# Patient Record
Sex: FEMALE | Race: WHITE | Employment: OTHER | URBAN - METROPOLITAN AREA
[De-identification: names, ages, dates, MRNs, and addresses within clinical notes are randomized per-mention and may not be internally consistent; named-entity substitution may affect disease eponyms.]

---

## 2017-01-05 ENCOUNTER — ANTICOAGULATION THERAPY VISIT (OUTPATIENT)
Dept: NURSING | Facility: CLINIC | Age: 82
End: 2017-01-05
Payer: COMMERCIAL

## 2017-01-05 DIAGNOSIS — Z79.01 LONG-TERM (CURRENT) USE OF ANTICOAGULANTS: Primary | ICD-10-CM

## 2017-01-05 DIAGNOSIS — I48.0 PAROXYSMAL ATRIAL FIBRILLATION (H): ICD-10-CM

## 2017-01-05 LAB — INR POINT OF CARE: 2.8 (ref 0.86–1.14)

## 2017-01-05 PROCEDURE — 85610 PROTHROMBIN TIME: CPT | Mod: QW

## 2017-01-05 PROCEDURE — 36416 COLLJ CAPILLARY BLOOD SPEC: CPT

## 2017-01-05 PROCEDURE — 99207 ZZC NO CHARGE NURSE ONLY: CPT

## 2017-01-05 NOTE — MR AVS SNAPSHOT
Shannan TERAN Rasta   1/5/2017 9:00 AM   Anticoagulation Therapy Visit    Description:  85 year old female   Provider:  DARA ANTI COAG   Department:  [facility]           INR as of 1/5/2017     Selected INR 2.8 (1/5/2017)      Anticoagulation Summary as of 1/5/2017     INR goal 2.0-3.0   Selected INR 2.8 (1/5/2017)   Full instructions 7.5 mg on Wed; 5 mg all other days   Next INR check 2/16/2017    Indications   Paroxysmal atrial fibrillation (H) [I48.0]  Long-term (current) use of anticoagulants [Z79.01] [Z79.01]         Your next Anticoagulation Clinic appointment(s)     Jan 05, 2017  9:00 AM   Anticoagulation Visit with DARA ANTI COAG   [facility] ([place] [id number] [facility] [id number] [phone]            Feb 16, 2017 10:00 AM   Anticoagulation Visit with DARA ANTI COAG   [facility] ([address] [id number] [phone]              Contact Numbers     [facility]  Please call [phone] to cancel and/or reschedule your appointment   Please call [phone] with any problems or questions regarding your therapy.        January 2017 Details    Sun Mon Tue Wed Thu Fri Sat     1               2               3               4               5      5 mg   See details      6      5 mg         7      5 mg           8      5 mg         9      5 mg         10      5 mg         11      7.5 mg         12      5 mg         13      5 mg         14      5 mg           15      5 mg         16      5 mg         17      5 mg         18      7.5 mg         19      5 mg         20      5 mg         21      5 mg           22      5 mg         23      5 mg         24      5 mg         25      7.5 mg         26      5 mg         27      5 mg         28      5 mg           29      5 mg         30      5 mg         31      5 mg              Date Details   01/05 This INR check               How to take your warfarin  dose     To take:  5 mg Take 1 of the 5 mg tablets.    To take:  7.5 mg Take 1.5 of the 5 mg tablets.           February 2017 Details    Sun Mon Tue Wed Thu Fri Sat        1      7.5 mg         2      5 mg         3      5 mg         4      5 mg           5      5 mg         6      5 mg         7      5 mg         8      7.5 mg         9      5 mg         10      5 mg         11      5 mg           12      5 mg         13      5 mg         14      5 mg         15      7.5 mg         16            17               18                 19               20               21               22               23               24               25                 26               27               28                    Date Details   No additional details    Date of next INR:  2/16/2017         How to take your warfarin dose     To take:  5 mg Take 1 of the 5 mg tablets.    To take:  7.5 mg Take 1.5 of the 5 mg tablets.

## 2017-01-05 NOTE — PROGRESS NOTES
ANTICOAGULATION FOLLOW-UP CLINIC VISIT    Patient Name:  Shannan Magdaleno  Date:  1/5/2017  Contact Type:  Face to Face    SUBJECTIVE:     Patient Findings     Positives No Problem Findings           OBJECTIVE    INR PROTIME   Date Value Ref Range Status   01/05/2017 2.8* 0.86 - 1.14 Final       ASSESSMENT / PLAN  INR assessment THER    Recheck INR In: 6 WEEKS    INR Location Clinic      Anticoagulation Summary as of 1/5/2017     INR goal 2.0-3.0   Selected INR 2.8 (1/5/2017)   Maintenance plan 7.5 mg (5 mg x 1.5) on Wed; 5 mg (5 mg x 1) all other days   Full instructions 7.5 mg on Wed; 5 mg all other days   Weekly total 37.5 mg   No change documented Robin Stratton RN   Plan last modified Alessandra Garcia RN (3/16/2016)   Next INR check 2/16/2017   Priority INR   Target end date     Indications   Paroxysmal atrial fibrillation (H) [I48.0]  Long-term (current) use of anticoagulants [Z79.01] [Z79.01]         Anticoagulation Episode Summary     INR check location     Preferred lab     Send INR reminders to [facility] CLINIC    Comments       Anticoagulation Care Providers     Provider Role Specialty Phone number    Adela Morgan MD Responsible Internal Medicine [phone]            See the Encounter Report to view Anticoagulation Flowsheet and Dosing Calendar (Go to Encounters tab in chart review, and find the Anticoagulation Therapy Visit)    RN reviewed patient's weekly warfarin dose.  Pt INR remains within therapeutic range.  Pt will continue with current dosing and monitoring as planned, based on physician directed care plan.    [facility] ANTI-COAG [facility]     Robin Stratton RN

## 2017-01-06 RX ORDER — WARFARIN SODIUM 5 MG/1
TABLET ORAL
Qty: 90 TABLET | Refills: 0 | Status: SHIPPED | OUTPATIENT
Start: 2017-01-06 | End: 2017-01-25

## 2017-01-06 NOTE — TELEPHONE ENCOUNTER
Medication refilled per [facility] RN protocol.   Katherin Ford, RN   [facility]- Float Nurse

## 2017-01-06 NOTE — TELEPHONE ENCOUNTER
Warfarin     Last Written Prescription Date: 03/08/2016  Last Fill Qty: 90, # refills: 1  Last Office Visit with G, P or [facility] prescribing provider: 12/30/2016       Date and Result of Last PT/INR:   INR      2.8   1/5/2017  INR      2.7   11/23/2016  INR      3.1   11/28/2015  INR     1.70   7/7/2015  INR      2.0   8/21/2014

## 2017-01-18 DIAGNOSIS — F41.9 ANXIETY: Primary | ICD-10-CM

## 2017-01-19 NOTE — TELEPHONE ENCOUNTER
Controlled Substance Refill Request for diazepam (VALIUM) 2 MG tablet  Problem List Complete:  No     PROVIDER TO CONSIDER COMPLETION OF PROBLEM LIST AND OVERVIEW/CONTROLLED SUBSTANCE AGREEMENT    Last Written Prescription Date:  5/27/2016  Last Fill Quantity: 60,   # refills: 0    Last Office Visit with [facility] primary care provider: 12/30/2016    Future Office visit:     Controlled substance agreement on file: No.     Processing:  unknown   checked in past 6 months?  No, route to RN

## 2017-01-23 RX ORDER — DIAZEPAM 2 MG
TABLET ORAL
Qty: 60 TABLET | Refills: 0 | Status: SHIPPED | OUTPATIENT
Start: 2017-01-23 | End: 2017-03-29

## 2017-01-23 NOTE — TELEPHONE ENCOUNTER
Routing refill request to provider for review/approval because:  Drug not on the [facility] refill protocol   Abigail Perez RN

## 2017-01-23 NOTE — TELEPHONE ENCOUNTER
Valium prescription faxed to [facility] pharmacy at [phone].  Marifer Nieves,

## 2017-01-25 ENCOUNTER — OFFICE VISIT (OUTPATIENT)
Dept: FAMILY MEDICINE | Facility: CLINIC | Age: 82
End: 2017-01-25
Payer: COMMERCIAL

## 2017-01-25 VITALS
BODY MASS INDEX: 27.05 KG/M2 | WEIGHT: 147 LBS | OXYGEN SATURATION: 96 % | HEART RATE: 70 BPM | SYSTOLIC BLOOD PRESSURE: 136 MMHG | TEMPERATURE: 98.1 F | DIASTOLIC BLOOD PRESSURE: 73 MMHG | HEIGHT: 62 IN

## 2017-01-25 DIAGNOSIS — F41.9 ANXIETY: ICD-10-CM

## 2017-01-25 DIAGNOSIS — J06.9 UPPER RESPIRATORY TRACT INFECTION, UNSPECIFIED TYPE: Primary | ICD-10-CM

## 2017-01-25 DIAGNOSIS — C83.00 LYMPHOMA, SMALL LYMPHOCYTIC (H): ICD-10-CM

## 2017-01-25 DIAGNOSIS — Z71.89 ADVANCED DIRECTIVES, COUNSELING/DISCUSSION: ICD-10-CM

## 2017-01-25 DIAGNOSIS — I48.0 PAROXYSMAL ATRIAL FIBRILLATION (H): ICD-10-CM

## 2017-01-25 PROCEDURE — 99213 OFFICE O/P EST LOW 20 MIN: CPT | Performed by: FAMILY MEDICINE

## 2017-01-25 RX ORDER — ESCITALOPRAM OXALATE 10 MG/1
15 TABLET ORAL DAILY
Qty: 135 TABLET | Refills: 3 | Status: SHIPPED | OUTPATIENT
Start: 2017-01-25 | End: 2017-05-08

## 2017-01-25 RX ORDER — WARFARIN SODIUM 5 MG/1
TABLET ORAL
Qty: 90 TABLET | Refills: 1 | Status: SHIPPED | OUTPATIENT
Start: 2017-01-25 | End: 2017-09-22

## 2017-01-25 ASSESSMENT — ANXIETY QUESTIONNAIRES
6. BECOMING EASILY ANNOYED OR IRRITABLE: SEVERAL DAYS
GAD7 TOTAL SCORE: 3
1. FEELING NERVOUS, ANXIOUS, OR ON EDGE: SEVERAL DAYS
2. NOT BEING ABLE TO STOP OR CONTROL WORRYING: NOT AT ALL
7. FEELING AFRAID AS IF SOMETHING AWFUL MIGHT HAPPEN: NOT AT ALL
IF YOU CHECKED OFF ANY PROBLEMS ON THIS QUESTIONNAIRE, HOW DIFFICULT HAVE THESE PROBLEMS MADE IT FOR YOU TO DO YOUR WORK, TAKE CARE OF THINGS AT HOME, OR GET ALONG WITH OTHER PEOPLE: SOMEWHAT DIFFICULT
5. BEING SO RESTLESS THAT IT IS HARD TO SIT STILL: NOT AT ALL
3. WORRYING TOO MUCH ABOUT DIFFERENT THINGS: SEVERAL DAYS

## 2017-01-25 ASSESSMENT — PATIENT HEALTH QUESTIONNAIRE - PHQ9: 5. POOR APPETITE OR OVEREATING: NOT AT ALL

## 2017-01-25 NOTE — PROGRESS NOTES
"  SUBJECTIVE:                                                    Shannan Magdaleno is a 85 year old female who presents to clinic today for the following health issues:    RESPIRATORY SYMPTOMS      Duration: x 5-6 days     Description  cough    Severity: moderate    Accompanying signs and symptoms: cough is usually worse at night     History (predisposing factors):  none    Precipitating or alleviating factors: None    Therapies tried and outcome:  rest and fluids oral decongestant     Exposed to  with similar symptoms.     I have reviewed the patient's medical history in detail and updated the computerized patient record.     ROS:  C: NEGATIVE for fever, chills, change in weight  I: NEGATIVE for worrisome rashes, moles or lesions  E: NEGATIVE for vision changes or irritation  ENT/MOUTH: see HPI   RESP:as above  CV: NEGATIVE for chest pain, palpitations or peripheral edema  HEME/ALLERGY/IMMUNE: lymphoma followed by oncology   PSYCHIATRIC: history of anxiety     OBJECTIVE:                                                    /73 mmHg  Pulse 70  Temp(Src) 98.1  F (36.7  C) (Oral)  Ht 5' 2\" (1.575 m)  Wt 147 lb (66.679 kg)  BMI 26.88 kg/m2  SpO2 96%  Breastfeeding? No  Body mass index is 26.88 kg/(m^2).  GENERAL: healthy, alert and no distress  EYES: Eyes grossly normal to inspection, PERRL and conjunctivae and sclerae normal  HENT: ear canals and TM's normal, nose and mouth without ulcers or lesions  NECK: no adenopathy, no asymmetry, masses, or scars and thyroid normal to palpation  RESP: lungs clear to auscultation - no rales, rhonchi or wheezes  CV: regular rate and rhythm, normal S1 S2, no S3 or S4, no murmur, click or rub, no peripheral edema and peripheral pulses strong  MS: no gross musculoskeletal defects noted, no edema  PSYCH: mentation appears normal, affect normal/bright    Diagnostic Test Results:  Results for orders placed or performed in visit on 01/05/17   INR point of care   Result " Value Ref Range    INR Protime 2.8 (A) 0.86 - 1.14        ASSESSMENT/PLAN:                                                    (J06.9) Upper respiratory tract infection, unspecified type  (primary encounter diagnosis)  Plan: Symptomatic care.  Return to clinic for persistence, recurrence or new symptoms.     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   Plan: escitalopram (LEXAPRO) 10 MG tablet          (Z71.89) Advanced directives, counseling/discussion  Plan: referral for planning     See Patient Instructions    Nancy Messer MD  [facility]

## 2017-01-25 NOTE — NURSING NOTE
"Chief Complaint   Patient presents with     URI       Initial /73 mmHg  Pulse 70  Temp(Src) 98.1  F (36.7  C) (Oral)  Ht 5' 2\" (1.575 m)  Wt 147 lb (66.679 kg)  BMI 26.88 kg/m2  SpO2 96%  Breastfeeding? No Estimated body mass index is 26.88 kg/(m^2) as calculated from the following:    Height as of this encounter: 5' 2\" (1.575 m).    Weight as of this encounter: 147 lb (66.679 kg).  BP completed using cuff size: barrett Warren      "

## 2017-01-25 NOTE — MR AVS SNAPSHOT
After Visit Summary   1/25/2017    Shannan Magdaleno    MRN: [id number]           Patient Information     Date Of Birth          12/2/1931        Visit Information        Provider Department      1/25/2017 11:00 AM Nancy Messer MD [facility]        Today's Diagnoses     Upper respiratory tract infection, unspecified type    -  1     Lymphoma, small lymphocytic (H)         Paroxysmal atrial fibrillation (H)         Anxiety         Advanced directives, counseling/discussion           Care Instructions    You can try over-the-counter Afrin nasal spray for your drainage and congestion.    Discuss getting a shingles (Zostavax) vaccine from your pharmacist, or schedule an ancillary shot visit here.  Some insurances do not cover the cost of these vaccines.          [facility]    If you have any questions regarding to your visit please contact your care team:       Team Red:   Clinic Hours Telephone Number   Dr. Nancy Naranjo, NP   7am-7pm  Monday - Thursday   7am-5pm  Fridays  [phone]  (Appointment scheduling available 24/7)    Questions about your visit?   Team Line  [phone]   Urgent Care - [place] [facility] - 11am-9pm Monday-Friday Saturday-Sunday- 9am-5pm   [place] - 5pm-9pm Monday-Friday Saturday-Sunday- 9am-5pm  [phone] - [place]   [phone] - [place]       What options do I have for visits at the clinic other than the traditional office visit?  To expand how we care for you, many of our providers are utilizing electronic visits (e-visits) and telephone visits, when medically appropriate, for interactions with their patients rather than a visit in the clinic.   We also offer nurse visits for many medical concerns. Just like any other service, we will bill your insurance company for this type of visit based on time spent on the phone with your provider. Not all insurance  companies cover these visits. Please check with your medical insurance if this type of visit is covered. You will be responsible for any charges that are not paid by your insurance.      E-visits via el?hart:  generally incur a $35.00 fee.  Telephone visits:  Time spent on the phone: *charged based on time that is spent on the phone in increments of 10 minutes. Estimated cost:   5-10 mins $30.00   11-20 mins. $59.00   21-30 mins. $85.00     Use [address] (secure email communication and access to your chart) to send your primary care provider a message or make an appointment. Ask someone on your Team how to sign up for [address].  For a Price Quote for your services, please call our V2contact Price Line at [phone].      As always, Thank you for trusting us with your health care needs!  Pooja BURRELL MA          Follow-ups after your visit        Your next 10 appointments already scheduled     Feb 16, 2017 10:00 AM   Anticoagulation Visit with FZ ANTI COAG   [facility]    [address] [place] [id number] [phone]            May 18, 2017 10:45 AM   Return Visit with Jamari Stewatr MD   [facility] ([address] [id number] [phone]              Who to contact     If you have questions or need follow up information about today's clinic visit or your schedule please contact [facility] directly at [phone].  Normal or non-critical lab and imaging results will be communicated to you by MyChart, letter or phone within 4 business days after the clinic has received the results. If you do not hear from us within 7 days, please contact the clinic through el?hart or phone. If you have a critical or abnormal lab result, we will notify you by phone as soon as possible.  Submit refill requests through [address] or call your pharmacy and they will forward the refill request to us. Please allow  "3 business days for your refill to be completed.          Additional Information About Your Visit        MyChart Information     [employer] gives you secure access to your electronic health record. If you see a primary care provider, you can also send messages to your care team and make appointments. If you have questions, please call your primary care clinic.  If you do not have a primary care provider, please call [phone] and they will assist you.        Care EveryWhere ID     This is your Care EveryWhere ID. This could be used by other organizations to access your [place] medical records  [id number]        Your Vitals Were     Pulse Temperature Height BMI (Body Mass Index) Pulse Oximetry Breastfeeding?    70 98.1  F (36.7  C) (Oral) 5' 2\" (1.575 m) 26.88 kg/m2 96% No       Blood Pressure from Last 3 Encounters:   01/25/17 136/73   12/30/16 140/78   08/03/16 136/68    Weight from Last 3 Encounters:   01/25/17 147 lb (66.679 kg)   12/30/16 151 lb 12.8 oz (68.856 kg)   08/03/16 148 lb (67.132 kg)              Today, you had the following     No orders found for display         Today's Medication Changes          These changes are accurate as of: 1/25/17 11:29 AM.  If you have any questions, ask your nurse or doctor.               These medicines have changed or have updated prescriptions.        Dose/Directions    warfarin 5 MG tablet   Commonly known as:  COUMADIN   This may have changed:  See the new instructions.   Used for:  Paroxysmal atrial fibrillation (H)   Changed by:  Nancy Messer MD        TAKE 1 & 1/2 TABLETS ON WED, AND 1 TAB REST OF WEEK   Quantity:  90 tablet   Refills:  1            Where to get your medicines      These medications were sent to [facility]/pharmacy #[id number] - [place], [place] - [address] [facility]  [address], [place] [address]     Phone:  [phone]    - escitalopram 10 MG tablet  - warfarin 5 MG tablet             Primary Care Provider " Office Phone # Fax #    Adela Breanne Morgan [phone]       [address]  [place] [address]        Thank you!     Thank you for choosing [facility]  for your care. Our goal is always to provide you with excellent care. Hearing back from our patients is one way we can continue to improve our services. Please take a few minutes to complete the written survey that you may receive in the mail after your visit with us. Thank you!             Your Updated Medication List - Protect others around you: Learn how to safely use, store and throw away your medicines at www.disposemymeds.org.          This list is accurate as of: 1/25/17 11:29 AM.  Always use your most recent med list.                   Brand Name Dispense Instructions for use    calcium-vitamin D 600-400 MG-UNIT per tablet    CALTRATE     Take 1 tablet by mouth daily       CENTRUM SILVER per tablet     30 tablet    Take 1 tablet by mouth daily Has 2000 IU of Vitamin D in it.       diazepam 2 MG tablet    VALIUM    60 tablet    TAKE 1 TABLET BY MOUTH AT BEDTIME FOR ANXIETY       escitalopram 10 MG tablet    LEXAPRO    135 tablet    Take 1.5 tablets (15 mg) by mouth daily       metoprolol 25 MG tablet    LOPRESSOR    270 tablet    TAKE ONE AND ONE-HALF TABLETS BY MOUTH TWICE DAILY       nitroglycerin 0.4 MG sublingual tablet    NITROSTAT    25 tablet    Place 1 tablet (0.4 mg) under the tongue every 5 minutes as needed for chest pain       simvastatin 40 MG tablet    ZOCOR    90 tablet    TAKE 1 TABLET (40 MG) BY MOUTH AT BEDTIME       TYLENOL 500 MG tablet   Generic drug:  acetaminophen      Take 1-2 tablets by mouth every 6 hours as needed.       warfarin 5 MG tablet    COUMADIN    90 tablet    TAKE 1 & 1/2 TABLETS ON WED, AND 1 TAB REST OF WEEK

## 2017-01-25 NOTE — PATIENT INSTRUCTIONS
You can try over-the-counter Afrin nasal spray for your drainage and congestion.    Discuss getting a shingles (Zostavax) vaccine from your pharmacist, or schedule an ancillary shot visit here.  Some insurances do not cover the cost of these vaccines.          [facility]    If you have any questions regarding to your visit please contact your care team:       Team Red:   Clinic Hours Telephone Number   Dr. Nancy Naranjo, NP   7am-7pm  Monday - Thursday   7am-5pm  Fridays  [phone]  (Appointment scheduling available 24/7)    Questions about your visit?   Team Line  [phone]   Urgent Care - [place] and [facility] - 11am-9pm Monday-Friday Saturday-Sunday- 9am-5pm   [place] - 5pm-9pm Monday-Friday Saturday-Sunday- 9am-5pm  [phone] - [place]   [phone] - [place]       What options do I have for visits at the clinic other than the traditional office visit?  To expand how we care for you, many of our providers are utilizing electronic visits (e-visits) and telephone visits, when medically appropriate, for interactions with their patients rather than a visit in the clinic.   We also offer nurse visits for many medical concerns. Just like any other service, we will bill your insurance company for this type of visit based on time spent on the phone with your provider. Not all insurance companies cover these visits. Please check with your medical insurance if this type of visit is covered. You will be responsible for any charges that are not paid by your insurance.      E-visits via [employer]:  generally incur a $35.00 fee.  Telephone visits:  Time spent on the phone: *charged based on time that is spent on the phone in increments of 10 minutes. Estimated cost:   5-10 mins $30.00   11-20 mins. $59.00   21-30 mins. $85.00     Use [employer] (secure email communication and access to your chart) to send your primary care provider a  message or make an appointment. Ask someone on your Team how to sign up for [employer].  For a Price Quote for your services, please call our Consumer Price Line at [phone].      As always, Thank you for trusting us with your health care needs!  Pooja BURRELL MA

## 2017-01-26 ASSESSMENT — PATIENT HEALTH QUESTIONNAIRE - PHQ9: SUM OF ALL RESPONSES TO PHQ QUESTIONS 1-9: 2

## 2017-01-26 ASSESSMENT — ANXIETY QUESTIONNAIRES: GAD7 TOTAL SCORE: 3

## 2017-02-17 ENCOUNTER — ANTICOAGULATION THERAPY VISIT (OUTPATIENT)
Dept: NURSING | Facility: CLINIC | Age: 82
End: 2017-02-17
Payer: COMMERCIAL

## 2017-02-17 DIAGNOSIS — I48.0 PAROXYSMAL ATRIAL FIBRILLATION (H): ICD-10-CM

## 2017-02-17 DIAGNOSIS — Z79.01 LONG-TERM (CURRENT) USE OF ANTICOAGULANTS: ICD-10-CM

## 2017-02-17 LAB — INR POINT OF CARE: 2.8 (ref 0.86–1.14)

## 2017-02-17 PROCEDURE — 36416 COLLJ CAPILLARY BLOOD SPEC: CPT

## 2017-02-17 PROCEDURE — 85610 PROTHROMBIN TIME: CPT | Mod: QW

## 2017-02-17 PROCEDURE — 99207 ZZC NO CHARGE NURSE ONLY: CPT

## 2017-02-17 NOTE — PROGRESS NOTES
ANTICOAGULATION FOLLOW-UP CLINIC VISIT    Patient Name:  Shannan Magdaleno  Date:  2/17/2017  Contact Type:  Face to Face    SUBJECTIVE:     Patient Findings     Positives No Problem Findings           OBJECTIVE    INR Protime   Date Value Ref Range Status   02/17/2017 2.8 (A) 0.86 - 1.14 Final       ASSESSMENT / PLAN  INR assessment THER    Recheck INR In: 6 WEEKS    INR Location Clinic      Anticoagulation Summary as of 2/17/2017     INR goal 2.0-3.0   Today's INR 2.8   Maintenance plan 7.5 mg (5 mg x 1.5) on Wed; 5 mg (5 mg x 1) all other days   Full instructions 7.5 mg on Wed; 5 mg all other days   Weekly total 37.5 mg   No change documented Clarke Rooney RN   Plan last modified Alessandra Garcia RN (3/16/2016)   Next INR check 3/31/2017   Priority INR   Target end date     Indications   Paroxysmal atrial fibrillation (H) [I48.0]  Long-term (current) use of anticoagulants [Z79.01] [Z79.01]         Anticoagulation Episode Summary     INR check location     Preferred lab     Send INR reminders to [facility] CLINIC    Comments       Anticoagulation Care Providers     Provider Role Specialty Phone number    Adela Morgan MD [facility] Internal Medicine [phone]            See the Encounter Report to view Anticoagulation Flowsheet and Dosing Calendar (Go to Encounters tab in chart review, and find the Anticoagulation Therapy Visit)    Dosage adjustment made based on physician directed care plan.    Clarke Rooney, RN

## 2017-03-07 ENCOUNTER — MYC MEDICAL ADVICE (OUTPATIENT)
Dept: FAMILY MEDICINE | Facility: CLINIC | Age: 82
End: 2017-03-07

## 2017-03-07 ENCOUNTER — TELEPHONE (OUTPATIENT)
Dept: FAMILY MEDICINE | Facility: CLINIC | Age: 82
End: 2017-03-07

## 2017-03-08 ENCOUNTER — TRANSFERRED RECORDS (OUTPATIENT)
Dept: HEALTH INFORMATION MANAGEMENT | Facility: CLINIC | Age: 82
End: 2017-03-08

## 2017-03-08 NOTE — TELEPHONE ENCOUNTER
Shannan's Daughter Minerva calling requesting an ER fu visit with  tomorrow Wednesday 3/8/17. She was seen for UTI and Pneumonia. Please call Minerva at [phone] to advise if  can fit Shannan in for this. (Only 1 short same day visit left on schedule for 3/8/17) OK to LM Thank You.    Vanessa Perez Scheduling

## 2017-03-08 NOTE — TELEPHONE ENCOUNTER
Spoke with daughter.  She stated that she is at patient's home right now and will be bringing her back to the ED as patient is not doing well.  She is having diarrhea and is weak.  Some SOB.    Tania Hall RN

## 2017-03-13 ENCOUNTER — TELEPHONE (OUTPATIENT)
Dept: CARE COORDINATION | Facility: CLINIC | Age: 82
End: 2017-03-13

## 2017-03-13 DIAGNOSIS — M62.81 GENERALIZED MUSCLE WEAKNESS: Primary | ICD-10-CM

## 2017-03-13 NOTE — TELEPHONE ENCOUNTER
Clinic Care Coordination Contact  Care Team Conversations    Notification received today that patient was discharged from [facility] on 3/11/2017 to [facility].      Plan: RN CC will send this note to [facility]  to contact TCU.  CC will follow up with patient after discharge from TCU.    LISS Montgomery, RN, PHN  [facility] Care Coordinator  [phone]  March 13, 2017

## 2017-03-21 ENCOUNTER — MYC MEDICAL ADVICE (OUTPATIENT)
Dept: FAMILY MEDICINE | Facility: CLINIC | Age: 82
End: 2017-03-21

## 2017-03-21 NOTE — TELEPHONE ENCOUNTER
Pt. Has INR appt. Tomorrow   DaughterMinerva requesting OV with PCP tomorrow around same time.  Warfarin was not listed on d/c summary.  Shama Chandra RN

## 2017-03-21 NOTE — TELEPHONE ENCOUNTER
"ED/Discharge Protocol    \"Hi, my name is Shamachris Chandra, a registered nurse, and I am calling on behalf of Dr. Morgan's office at [facility].  I am calling to follow up and see how things are going for you after your recent visit.\"    \"I see that you were in the (ER/UC/IP) on 3/20/17.    How are you doing now that you are home?\" ok    Is patient experiencing symptoms that may require a hospital visit?  no    Discharge Instructions    \"Let's review your discharge instructions.  What is/are the follow-up recommendations?  Pt. Response: follow up with PCP    \"Were you instructed to make a follow-up appointment?\"  Pt. Response: Yes.  Has appointment been made?   No.  \"Can I help you schedule that appointment?\" INR for 3/22 and Dr. Morgan 3/29      \"When you see the provider, I would recommend that you bring your discharge instructions with you.    Medications    \"How many new medications are you on since your hospitalization/ED visit?\"    0-1  \"How many of your current medicines changed (dose, timing, name, etc.) while you were in the hospital/ED visit?\"   0-1  \"Do you have questions about your medications?\"   No  \"Were you newly diagnosed with heart failure, COPD, diabetes or did you have a heart attack?\"   No  For patients on insulin: \"Did you start on insulin in the hospital or did you have your insulin dose changed?\"   No    Medication reconciliation completed? No    Was MTM referral placed (*Make sure to put transitions as reason for referral)?   No    Call Summary    \"Do you have any questions or concerns about your condition or care plan at the moment?\"    Yes coumadin and INR  Triage nurse advice given: get INR and dosing tomorrow at appt.      \"If you have questions or things don't continue to improve, we encourage you contact us through the main clinic number,  [phone]..  Even if the clinic is not open, triage nurses are available 24/7 to help you.     [facility] would like you to know that our clinic has extended " "hours (provide information).  We also have urgent care (provide details on closest location and hours/contact info)\"  -    \"Thank you for your time and take care!\"  Shama Chandra RN        "

## 2017-03-21 NOTE — TELEPHONE ENCOUNTER
Left message for daughter, Minerva that patient is scheduled to see Dr. Morgan tomorrow, Wednesday, March 22nd at 11:30 a.m.  INR appointment cancelled as she will have her INR done at the time she sees Dr. Morgan.  Asked Minerva to call me back ASAP if this time does not work.  Marifer Nieves,

## 2017-03-22 ENCOUNTER — DOCUMENTATION ONLY (OUTPATIENT)
Dept: CARE COORDINATION | Facility: CLINIC | Age: 82
End: 2017-03-22

## 2017-03-22 ENCOUNTER — OFFICE VISIT (OUTPATIENT)
Dept: FAMILY MEDICINE | Facility: CLINIC | Age: 82
End: 2017-03-22
Payer: COMMERCIAL

## 2017-03-22 ENCOUNTER — RADIANT APPOINTMENT (OUTPATIENT)
Dept: GENERAL RADIOLOGY | Facility: CLINIC | Age: 82
End: 2017-03-22
Attending: INTERNAL MEDICINE
Payer: COMMERCIAL

## 2017-03-22 VITALS
OXYGEN SATURATION: 98 % | SYSTOLIC BLOOD PRESSURE: 124 MMHG | HEART RATE: 64 BPM | BODY MASS INDEX: 26.98 KG/M2 | TEMPERATURE: 98.2 F | WEIGHT: 147.5 LBS | DIASTOLIC BLOOD PRESSURE: 64 MMHG

## 2017-03-22 DIAGNOSIS — I10 HYPERTENSION GOAL BP (BLOOD PRESSURE) < 140/90: ICD-10-CM

## 2017-03-22 DIAGNOSIS — Z71.89 ADVANCED DIRECTIVES, COUNSELING/DISCUSSION: ICD-10-CM

## 2017-03-22 DIAGNOSIS — F41.9 ANXIETY: ICD-10-CM

## 2017-03-22 DIAGNOSIS — J18.9 PNEUMONIA OF LEFT LOWER LOBE DUE TO INFECTIOUS ORGANISM: Primary | ICD-10-CM

## 2017-03-22 DIAGNOSIS — Z91.89 DRIVING SAFETY ISSUE: ICD-10-CM

## 2017-03-22 DIAGNOSIS — I48.0 PAROXYSMAL ATRIAL FIBRILLATION (H): ICD-10-CM

## 2017-03-22 DIAGNOSIS — R07.81 RIB PAIN ON RIGHT SIDE: ICD-10-CM

## 2017-03-22 LAB — INR PPP: 2 (ref 0.86–1.14)

## 2017-03-22 PROCEDURE — 99214 OFFICE O/P EST MOD 30 MIN: CPT | Performed by: INTERNAL MEDICINE

## 2017-03-22 PROCEDURE — 71101 X-RAY EXAM UNILAT RIBS/CHEST: CPT | Mod: RT

## 2017-03-22 PROCEDURE — 36415 COLL VENOUS BLD VENIPUNCTURE: CPT | Performed by: INTERNAL MEDICINE

## 2017-03-22 PROCEDURE — 85610 PROTHROMBIN TIME: CPT | Performed by: INTERNAL MEDICINE

## 2017-03-22 ASSESSMENT — PAIN SCALES - GENERAL: PAINLEVEL: SEVERE PAIN (6)

## 2017-03-22 NOTE — MR AVS SNAPSHOT
After Visit Summary   3/22/2017    Shannan Magdaleno    MRN: [id number]           Patient Information     Date Of Birth          12/2/1931        Visit Information        Provider Department      3/22/2017 11:30 AM Adela Morgan MD [facility]        Today's Diagnoses     Pneumonia of left lower lobe due to infectious organism    -  1    Rib pain on right side        Paroxysmal atrial fibrillation (H)        Hypertension goal BP (blood pressure) < 140/90        Anxiety        Advanced directives, counseling/discussion          Care Instructions    Come in for a chest xray in 4 weeks to see if your pneumonia is completely cleared.  Focus on taking deep breaths periodically to clear your lungs.  You can stop the Colace.  We will call about the warfarin dose.    No driving alone.      [facility]    If you have any questions regarding to your visit please contact your care team:     Team Pink:   Clinic Hours Telephone Number   Internal Medicine:  Dr. Adela Yin, NP       7am-7pm  Monday - Thursday   7am-5pm  Fridays  [phone]  (Appointment scheduling available 24/7)    Questions about your visit?  Team Line  [phone]   Urgent Care - [place] and [place] - 11am-9pm Monday-Friday Saturday-Sunday- 9am-5pm   [place] - 5pm-9pm Monday-Friday Saturday-Sunday- 9am-5pm  [phone] - [place]   [phone] - [place]       What options do I have for visits at the clinic other than the traditional office visit?  To expand how we care for you, many of our providers are utilizing electronic visits (e-visits) and telephone visits, when medically appropriate, for interactions with their patients rather than a visit in the clinic.   We also offer nurse visits for many medical concerns. Just like any other service, we will bill your insurance company for this type of visit based on time spent on the phone with your  provider. Not all insurance companies cover these visits. Please check with your medical insurance if this type of visit is covered. You will be responsible for any charges that are not paid by your insurance.      E-visits via [employer]:  generally incur a $35.00 fee.  Telephone visits:  Time spent on the phone: *charged based on time that is spent on the phone in increments of 10 minutes. Estimated cost:   5-10 mins $30.00   11-20 mins. $59.00   21-30 mins. $85.00   Use [email or web address] (secure email communication and access to your chart) to send your primary care provider a message or make an appointment. Ask someone on your Team how to sign up for [email or web address].    For a Price Quote for your services, please call our [employer] Price Line at [phone].    As always, Thank you for trusting us with your health care needs!    Chiara Mercer CMA          Follow-ups after your visit        Additional Services     HONORING CHOICES REFERRAL       Your provider has referred you to Advance Directive Counseling with our [facility] [facility].    Reason for Referral: Facilitate General Advance Care Planning    The [employer] Representative will call you to schedule your appointment, unless your appointment was already scheduled at your clinic.  You may also call the [employer] Representative at [phone] to schedule your appointment.                  Your next 10 appointments already scheduled     Mar 29, 2017  1:00 PM CDT   [facility] with Adela Morgan MD   [facility]    [address] [facility] [address]   [phone]            May 18, 2017 10:45 AM CDT   Return Visit with Jamari Stewart MD   [facility]    [address] [facility] [address]   [phone]              Future tests that were ordered for you today     Open Future Orders        Priority Expected Expires Ordered    XR Chest 2 Views Routine 4/22/2017  3/22/2018 3/22/2017            Who to contact     If you have questions or need follow up information about today's clinic visit or your schedule please contact [facility] directly at [phone].  Normal or non-critical lab and imaging results will be communicated to you by MyChart, letter or phone within 4 business days after the clinic has received the results. If you do not hear from us within 7 days, please contact the clinic through MyChart or phone. If you have a critical or abnormal lab result, we will notify you by phone as soon as possible.  Submit refill requests through Getui or call your pharmacy and they will forward the refill request to us. Please allow 3 business days for your refill to be completed.          Additional Information About Your Visit        [employer] Information     [employer] gives you secure access to your electronic health record. If you see a primary care provider, you can also send messages to your care team and make appointments. If you have questions, please call your primary care clinic.  If you do not have a primary care provider, please call [phone] and they will assist you.        Care EveryWhere ID     This is your Care EveryWhere ID. This could be used by other organizations to access your [facility] medical records  [id number]        Your Vitals Were     Pulse Temperature Pulse Oximetry Breastfeeding? BMI (Body Mass Index)       64 98.2  F (36.8  C) (Oral) 98% No 26.98 kg/m2        Blood Pressure from Last 3 Encounters:   03/22/17 124/64   01/25/17 136/73   12/30/16 140/78    Weight from Last 3 Encounters:   03/22/17 147 lb 8 oz (66.9 kg)   01/25/17 147 lb (66.7 kg)   12/30/16 151 lb 12.8 oz (68.9 kg)              We Performed the Following     HONORING CHOICES REFERRAL     INR     XR Ribs & Chest Right G/E 3 Views        Primary Care Provider Office Phone # Fax #    Adela Morgan [phone]       [facility] [address]  [place] [address]        Thank you!     Thank you for choosing [facility]  for your care. Our goal is always to provide you with excellent care. Hearing back from our patients is one way we can continue to improve our services. Please take a few minutes to complete the written survey that you may receive in the mail after your visit with us. Thank you!             Your Updated Medication List - Protect others around you: Learn how to safely use, store and throw away your medicines at www.disposemymeds.org.          This list is accurate as of: 3/22/17 12:31 PM.  Always use your most recent med list.                   Brand Name Dispense Instructions for use    calcium-vitamin D 600-400 MG-UNIT per tablet    CALTRATE     Take 1 tablet by mouth daily       CENTRUM SILVER per tablet     30 tablet    Take 1 tablet by mouth daily Has 2000 IU of Vitamin D in it.       diazepam 2 MG tablet    VALIUM    60 tablet    TAKE 1 TABLET BY MOUTH AT BEDTIME FOR ANXIETY       escitalopram 10 MG tablet    LEXAPRO    135 tablet    Take 1.5 tablets (15 mg) by mouth daily       metoprolol 25 MG tablet    LOPRESSOR    270 tablet    TAKE ONE AND ONE-HALF TABLETS BY MOUTH TWICE DAILY       nitroglycerin 0.4 MG sublingual tablet    NITROSTAT    25 tablet    Place 1 tablet (0.4 mg) under the tongue every 5 minutes as needed for chest pain       simvastatin 40 MG tablet    ZOCOR    90 tablet    TAKE 1 TABLET (40 MG) BY MOUTH AT BEDTIME       TYLENOL 500 MG tablet   Generic drug:  acetaminophen      Take 1-2 tablets by mouth every 6 hours as needed.       warfarin 5 MG tablet    COUMADIN    90 tablet    TAKE 1 & 1/2 TABLETS ON WED, AND 1 TAB REST OF WEEK

## 2017-03-22 NOTE — PATIENT INSTRUCTIONS
Come in for a chest xray in 4 weeks to see if your pneumonia is completely cleared.  Focus on taking deep breaths periodically to clear your lungs.  You can stop the Colace.  We will call about the warfarin dose.    No driving alone.      [facility]    If you have any questions regarding to your visit please contact your care team:     Team Pink:   Clinic Hours Telephone Number   Internal Medicine:  Dr. Adela Yin, NP       7am-7pm  Monday - Thursday   7am-5pm  Fridays  [phone]  (Appointment scheduling available 24/7)    Questions about your visit?  Team Line  [phone]   Urgent Care - [place] - 11am-9pm Monday-Friday Saturday-Sunday- 9am-5pm   [place] - 5pm-9pm Monday-Friday Saturday-Sunday- 9am-5pm  [phone] - [place]   [phone] - [place]       What options do I have for visits at the clinic other than the traditional office visit?  To expand how we care for you, many of our providers are utilizing electronic visits (e-visits) and telephone visits, when medically appropriate, for interactions with their patients rather than a visit in the clinic.   We also offer nurse visits for many medical concerns. Just like any other service, we will bill your insurance company for this type of visit based on time spent on the phone with your provider. Not all insurance companies cover these visits. Please check with your medical insurance if this type of visit is covered. You will be responsible for any charges that are not paid by your insurance.      E-visits via [employer]:  generally incur a $35.00 fee.  Telephone visits:  Time spent on the phone: *charged based on time that is spent on the phone in increments of 10 minutes. Estimated cost:   5-10 mins $30.00   11-20 mins. $59.00   21-30 mins. $85.00   Use [employer] (secure email communication and access to your chart) to send your primary care provider a message or make  an appointment. Ask someone on your Team how to sign up for [employer].    For a Price Quote for your services, please call our [employer] Price Line at [phone].    As always, Thank you for trusting us with your health care needs!    Chiara Mercer CMA

## 2017-03-22 NOTE — PROGRESS NOTES
[facility] clinic to manage.      For tonight, go back to taking your normal warfarin dose of 5 mg daily.    Dr. Morgan

## 2017-03-22 NOTE — PROGRESS NOTES
[facility] now requests orders and shares plan of care/discharge summaries for some patients through [employer].  Please REPLY TO THIS MESSAGE in order to give authorization for orders when needed.  This is considered a verbal order, you will still receive a faxed copy of orders for signature.  Thank you for your assistance in improving collaboration for our patients.    ORDER PHysical Therapy Evaluation 1 x day x 1, Occupational Therapy Evaluation 1 x day x1,  Skilled Nursing Visits 2 x week x 9, 3 prn for teaching and assessments of Medication management, INR per MD order, Respiratory assessments for resolution of LLL Pneumonia. Evaluation of safety r/t increasing falls.      SUMMARY TO MD  85 year old female lives in her home of 50 plus years with her  , Pako, who is retired. Her medical history is as follows  A fib, CAD, MDD,  Coronary artery stent placement. Chronic Anticoagulation  treatment. History of falling twice in the last two years and also having increased forgetfullness that now requires her daughter to manage the set up of her medications.   BACKGROUND  Patient found down on the floor at home . She was taken to [facility] . She was  diagnosed with Pneumonia,  admitted on 3/8/17 and discharged on 3/11 to TCU for 8 days then returned home.  RECOMMENDATION Recommended action is skilled nursing for teaching and assessing medication management, respuratory staus due to Pneumonia, safety  and self care abilities. PT and OT evaluations for fall precautions, safe mobility and stgrength and endurance needs, cognitive functions and adaptive equipment needs. Family  and patient declined homehealth aide services. POLST was discussed at todays sn visit and they will review more in private and also update the Living Will.  Thank You.

## 2017-03-22 NOTE — NURSING NOTE
"Chief Complaint   Patient presents with     Hospital F/U     for fall and pneumonia, right side/rib cage pain, requesting INR to be checked-discuss change on Warfarin       Initial /64 (BP Location: Left arm, Cuff Size: Adult Regular)  Pulse 64  Temp 98.2  F (36.8  C) (Oral)  Wt 147 lb 8 oz (66.9 kg)  SpO2 98%  Breastfeeding? No  BMI 26.98 kg/m2 Estimated body mass index is 26.98 kg/(m^2) as calculated from the following:    Height as of 1/25/17: 5' 2\" (1.575 m).    Weight as of this encounter: 147 lb 8 oz (66.9 kg).  Medication Reconciliation: complete       Chiara Mercer, CMA      "

## 2017-03-22 NOTE — PROGRESS NOTES
"INTERNAL MEDICINE  SUBJECTIVE:                                                    Shannan Magdaleno is a 85 year old female who presents to clinic today for the following health issues:    Hospital Follow-up Visit:    Hospital/Nursing Home/IP Rehab Facility: [facility]  Date of Admission: 3/8/2017  Date of Discharge: 3/11/2017  Reason(s) for Admission: pneumonia symptoms - weak, dehydrated, coughing, dizzy, confused            Problems taking medications regularly:  None       Medication changes since discharge: Warfarin changed mg- discuss today       Problems adhering to non-medication therapy:  None    Summary of hospitalization:  [facility] discharge summary reviewed  Diagnostic Tests/Treatments reviewed.  Follow up needed: none  Other Healthcare Providers Involved in Patient s Care:         None  Update since discharge: improved.     Post Discharge Medication Reconciliation: discharge medications reconciled and changed, per note/orders (see AVS).  Plan of care communicated with patient     Coding guidelines for this visit:  Type of Medical   Decision Making Face-to-Face Visit       within 7 Days of discharge Face-to-Face Visit        within 14 days of discharge   Moderate Complexity [id number]   High Complexity 44691 [id number]          ED Impression and Plan 3/8/17:   \"Shannan Magdaleno is a 85 y.o. female who presents for evaluation of generalized weakness, cough, and diarrhea. She was seen at [facility] emergency department yesterday and diagnosed with pneumonia and UTI and started on Levaquin. At that time she declined admission. She has continues to feel weak, in the interim developed diarrhea and has had no improvement. Upon her arrival her vitals are within normal limits. Physical exam was unremarkable. She has a white count of 16, lactate 1.0, she does meet sepsis criteria but not severe sepsis or septic shock, I did order a 15mL/kg fluid bolus total. BMP shows mild hyponatremia and a normal creatinine. I didn't " "feel repeat chest xray or urinalysis was indicated in the emergency department. Her INR is 2.3. Influenza screen is negative. Since she will be admitted influenza PCR swab was obtained. Given her significant weakness, diarrhea, and not feeling improved I felt that admission was indicated for further care. Patient was agreeable. I spoke with Dr. Becerra who will admit the patient to his service.\"       Update - She is still trying to get her strength back. Was in interlude for 8 days after leaving the hospital. Now she is home. Patient mentions she has RUQ pain that they never assessed. When this first started she woke up on the hardwood floor and was laying there asleep for awhile because her  could not hear her downstairs. Denies pain, the area is just sore. Patient was taken to the hospital then brought home without knowledge. Her daughter saw her a couple days later and decided she was taking her back to the hospital. They were giving her Colace daily in the ED. Gave her 8- 6 MG Warfarin that her  filled. She took 1- 6 MG dose this morning.    Endorses slight SOB. Not while sitting. Cough has improved.  She will continue with PT and nurse visits for awhile.    Driving -  or children are driving her around. Daughter says the have brought up multiple times with her that they do not think she should be driving but patient feels different; is only driving close to home. Daughter is concerned about her hurting someone else. Knows she would be the same not wanting to lose her independence but patient notes she is not ready. Her daughter mentions she had that bad fall at [facility] because she made a poor decision getting things to her car, if she had someone there to help her it would be prevented.     During her cognitive test they suggested her not doing her own medications or driving anymore. Patient has admitted to having issues keeping her medications straight.     Mood - Patient is tolerating " Valium. The infection made her very confused. At the hospital she was anxious. Daughter feels Lexapro is doing a good job otherwise to control her anxiety.      Problem list and histories reviewed & adjusted, as indicated.  Additional history: as documented    Labs reviewed in EPIC  Reviewed and updated as needed this visit by clinical staff  Reviewed and updated as needed this visit by Provider    ROS:  C: NEGATIVE for fever, chills, change in weight  R: NEGATIVE for significant cough or SOB  CV: NEGATIVE for chest pain, palpitations or peripheral edema  M: NEGATIVE for significant arthralgias or myalgia  N: NEGATIVE for weakness, dizziness or paresthesias  P: NEGATIVE for changes in mood or affect    This document serves as a record of the services and decisions personally performed and made by Adela Morgan MD. It was created on his/her behalf by Meka Barber, trained medical scribe. The creation of this document is based the provider's statements to the medical scribes.    Scribe Meka Barber 11:58 AM, March 22, 2017  OBJECTIVE:                                                    /64 (BP Location: Left arm, Cuff Size: Adult Regular)  Pulse 64  Temp 98.2  F (36.8  C) (Oral)  Wt 66.9 kg (147 lb 8 oz)  SpO2 98%  Breastfeeding? No  BMI 26.98 kg/m2  Body mass index is 26.98 kg/(m^2).  GENERAL: alert and no distress  RESP: lungs clear to auscultation - no rales or rhonchi, slight end expiratory wheezing in the bilateral lungs partially cleared with coughing   CV: regular rate and rhythm, normal S1 S2, no S3 or S4, no murmur, click or rub, no peripheral edema and peripheral pulses strong  ABDOMEN: soft, nontender, no hepatosplenomegaly, no masses and bowel sounds normal  NEURO: Normal strength and tone, mentation intact and speech normal  PSYCH: mentation appears normal, affect normal/bright    Diagnostic Test Results:  Results for orders placed or performed in visit on 03/22/17 (from the past 24  hour(s))   INR   Result Value Ref Range    INR 2.00 (H) 0.86 - 1.14   XR Ribs & Chest Right G/E 3 Views    Narrative    XR RIBS & CHEST RT 3VW 3/22/2017 1:36 PM    HISTORY: Pleurodynia      Impression    IMPRESSION: Negative.    ANEESH ENCARNACION MD        ASSESSMENT/PLAN:                                                    1. Pneumonia of left lower lobe due to infectious organism  Improved. Patient feels better since Ed and Interim discharge. She is still trying to gain her strength back. She has slight end expiratory wheezing. Discussed the importance of periodically taking deep breaths or coughing to clear her lungs.  - XR Chest 2 Views; Future    2. Rib pain on right side  Unclear. XR today.  Suspect musculoskeletal    - XR Ribs & Chest Right G/E 3 Views    3. Paroxysmal atrial fibrillation (H)  Continues on Warfarin 5 MG. INR draw today.   - INR    4. Hypertension goal BP (blood pressure) < 140/90  Controlled. Continues on metoprolol.     5. Anxiety  Stable. Continues on Valium and Lexapro.    6. Advanced directives, counseling/discussion  Discussed importance of having her advanced directive put together. Her daughter will look into completing this with her.  - HONORING CHOICES REFERRAL    Driving safety issue - will not drive alone.  Children to ride with her.  If any concerns about her ability to drive then patient will take  evaluation test.        Patient Instructions     Come in for a chest xray in 4 weeks to see if your pneumonia is completely cleared.  Focus on taking deep breaths periodically to clear your lungs.  You can stop the Colace.  We will call about the warfarin dose.    No driving alone.      [facility]    If you have any questions regarding to your visit please contact your care team:     Team Pink:   Clinic Hours Telephone Number   Internal Medicine:  Dr. Adela Yin NP       7am-7pm  Monday - Thursday   7am-5pm  Fridays  [phone]  (Appointment scheduling available 24/7)    Questions about your visit?  Team Line  [phone]   Urgent Care - [place] - 11am-9pm Monday-Friday Saturday-Sunday- 9am-5pm   [place] - 5pm-9pm Monday-Friday Saturday-Sunday- 9am-5pm  [phone] - [place]   [phone] - [place]       What options do I have for visits at the clinic other than the traditional office visit?  To expand how we care for you, many of our providers are utilizing electronic visits (e-visits) and telephone visits, when medically appropriate, for interactions with their patients rather than a visit in the clinic.   We also offer nurse visits for many medical concerns. Just like any other service, we will bill your insurance company for this type of visit based on time spent on the phone with your provider. Not all insurance companies cover these visits. Please check with your medical insurance if this type of visit is covered. You will be responsible for any charges that are not paid by your insurance.      E-visits via [employer]:  generally incur a $35.00 fee.  Telephone visits:  Time spent on the phone: *charged based on time that is spent on the phone in increments of 10 minutes. Estimated cost:   5-10 mins $30.00   11-20 mins. $59.00   21-30 mins. $85.00   Use [employer] (secure email communication and access to your chart) to send your primary care provider a message or make an appointment. Ask someone on your Team how to sign up for [employer].    For a Price Quote for your services, please call our Consumer Price Line at [phone].    As always, Thank you for trusting us with your health care needs!    Chiara Mercer, DENISE      I spent 24 minutes of time with the patient and >50% of it was in education and counseling regarding ED follow-up.    The information in this document, created by the medical scribe for me, accurately reflects the services I personally performed and the decisions made by me. I  have reviewed and approved this document for accuracy prior to leaving the patient care area.  Adela Morgan MD  11:58 AM, 03/22/17    Adela Morgan MD  [facility]    Start 12:07 PM  End 12:31 PM

## 2017-03-23 ENCOUNTER — ANTICOAGULATION THERAPY VISIT (OUTPATIENT)
Dept: NURSING | Facility: CLINIC | Age: 82
End: 2017-03-23
Payer: COMMERCIAL

## 2017-03-23 DIAGNOSIS — Z79.01 LONG-TERM (CURRENT) USE OF ANTICOAGULANTS: ICD-10-CM

## 2017-03-23 DIAGNOSIS — I48.0 PAROXYSMAL ATRIAL FIBRILLATION (H): ICD-10-CM

## 2017-03-23 PROBLEM — Z91.89 DRIVING SAFETY ISSUE: Status: ACTIVE | Noted: 2017-03-23

## 2017-03-23 PROCEDURE — 99207 ZZC NO CHARGE NURSE ONLY: CPT | Performed by: INTERNAL MEDICINE

## 2017-03-23 NOTE — PROGRESS NOTES
ANTICOAGULATION FOLLOW-UP CLINIC VISIT    Patient Name:  Shannan Magadleno  Date:  3/23/2017  Contact Type:  Telephone    SUBJECTIVE:     Patient Findings     Positives No Problem Findings    Comments S/O:  Pt was called with the following orders: INR today of 2.0.  Pt is on Coumadin for A. Fib.  Current Coumadin dose is 7.5 mg Wed and 5 mg ROW.   Concerns today are: None Noted.    A/P: Pt's INR is Therapeutic  for his/her goal range of 2 - 3.  Reasons why INR is out of range may include:na  Recommended to have pt remain on the same Coumadin dose and to have his/her INR rechecked in 6 weeks on 5/2.      Moon Lobato RN - BC                 OBJECTIVE    INR   Date Value Ref Range Status   03/22/2017 2.00 (H) 0.86 - 1.14 Final       ASSESSMENT / PLAN  INR assessment THER    Recheck INR In: 6 WEEKS    INR Location Outside lab      Anticoagulation Summary as of 3/23/2017     INR goal 2.0-3.0   Today's INR 2.00 (3/22/2017)   Maintenance plan 7.5 mg (5 mg x 1.5) on Wed; 5 mg (5 mg x 1) all other days   Full instructions 7.5 mg on Wed; 5 mg all other days   Weekly total 37.5 mg   No change documented Moon Lobato RN   Plan last modified lAessandra Garcia RN (3/16/2016)   Next INR check 5/4/2017   Priority INR   Target end date     Indications   Paroxysmal atrial fibrillation (H) [I48.0]  Long-term (current) use of anticoagulants [Z79.01] [Z79.01]         Anticoagulation Episode Summary     INR check location     Preferred lab     Send INR reminders to [facility] CL[facility]    Comments       Anticoagulation Care Providers     Provider Role Specialty Phone number    Adela Morgan MD [facility] Internal Medicine [phone]            See the Encounter Report to view Anticoagulation Flowsheet and Dosing Calendar (Go to Encounters tab in chart review, and find the Anticoagulation Therapy Visit)    Dosage adjustment made based on physician directed care plan.    Moon Lobato RN

## 2017-03-23 NOTE — MR AVS SNAPSHOT
Shannan TERAN Rasta   3/23/2017   Anticoagulation Therapy Visit    Description:  85 year old female   Provider:  Adela Morgan MD   Department:  [facility] Nurse           INR as of 3/23/2017     Today's INR 2.00 (3/22/2017)      Anticoagulation Summary as of 3/23/2017     INR goal 2.0-3.0   Today's INR 2.00 (3/22/2017)   Full instructions 7.5 mg on Wed; 5 mg all other days   Next INR check 5/4/2017    Indications   Paroxysmal atrial fibrillation (H) [I48.0]  Long-term (current) use of anticoagulants [Z79.01] [Z79.01]         Your next Anticoagulation Clinic appointment(s)     May 04, 2017  9:30 AM CDT   Anticoagulation Visit with [facility] ANTI COAG   [facility] ([address] [address] [phone]              Contact Numbers     [facility]  Please call [phone] to cancel and/or reschedule your appointment   Please call [phone] with any problems or questions regarding your therapy.        March 2017 Details    Sun Mon Tue Wed Thu Fri Sat        1               2               3               4                 5               6               7               8               9               10               11                 12               13               14               15               16               17               18                 19               20               21               22               23      5 mg   See details      24      5 mg         25      5 mg           26      5 mg         27      5 mg         28      5 mg         29      7.5 mg         30      5 mg         31      5 mg           Date Details   03/23 This INR check               How to take your warfarin dose     To take:  5 mg Take 1 of the 5 mg tablets.    To take:  7.5 mg Take 1.5 of the 5 mg tablets.           April 2017 Details    Sun Mon Tue Wed Thu Fri Sat           1      5 mg           2      5 mg         3      5 mg         4      5 mg         5      7.5  mg         6      5 mg         7      5 mg         8      5 mg           9      5 mg         10      5 mg         11      5 mg         12      7.5 mg         13      5 mg         14      5 mg         15      5 mg           16      5 mg         17      5 mg         18      5 mg         19      7.5 mg         20      5 mg         21      5 mg         22      5 mg           23      5 mg         24      5 mg         25      5 mg         26      7.5 mg         27      5 mg         28      5 mg         29      5 mg           30      5 mg                Date Details   No additional details            How to take your warfarin dose     To take:  5 mg Take 1 of the 5 mg tablets.    To take:  7.5 mg Take 1.5 of the 5 mg tablets.           May 2017 Details    Sun Mon Tue Wed Thu Fri Sat      1      5 mg         2      5 mg         3      7.5 mg         4            5               6                 7               8               9               10               11               12               13                 14               15               16               17               18               19               20                 21               22               23               24               25               26               27                 28               29               30               31                   Date Details   No additional details    Date of next INR:  5/4/2017         How to take your warfarin dose     To take:  5 mg Take 1 of the 5 mg tablets.    To take:  7.5 mg Take 1.5 of the 5 mg tablets.

## 2017-03-24 ENCOUNTER — TELEPHONE (OUTPATIENT)
Dept: FAMILY MEDICINE | Facility: CLINIC | Age: 82
End: 2017-03-24

## 2017-03-24 NOTE — TELEPHONE ENCOUNTER
Patient due for Recheck on 5/4/17  Spoke with Danika and gave verbal orders to recheck INR on 5/4/17.  Advised that she call in INR results to the INR clinic  Patient will keep her INR appointment with the clinic in case she is discharged from  before then.  Danika will make a note of it and advise patient to cancell her 5/4/17 INR appointment at the clinic if she is still with [facility] by that time    Tania Hall RN

## 2017-03-24 NOTE — TELEPHONE ENCOUNTER
Reason for Call: Request for an order or referral:    Order or referral being requested: INR to be completed at patient's home     Date needed: by next week    Has the patient been seen by the PCP for this problem? NO    Additional comments: Na    Phone number Patient can be reached at:  Other phone number:  [phone]    Best Time:  anytime    Can we leave a detailed message on this number?  YES    Call taken on 3/24/2017 at 12:59 PM by Selma Tesfaye

## 2017-03-25 ENCOUNTER — DOCUMENTATION ONLY (OUTPATIENT)
Dept: CARE COORDINATION | Facility: CLINIC | Age: 82
End: 2017-03-25

## 2017-03-25 NOTE — PROGRESS NOTES
[facility] now requests orders and shares plan of care/discharge summaries for some patients through [employer].  Please REPLY TO THIS MESSAGE in order to give authorization for orders when needed.  This is considered a verbal order, you will still receive a faxed copy of orders for signature.  Thank you for your assistance in improving collaboration for our patients.    ORDER  Continue PT treatment 2 week 1, 1 week 1  Gait and transfer training and exercises   Thank you.   Coleen Sin  PT  [email or web address]  [phone]      MD SUMMARY/PLAN OF CARE  Patient is a 85 year old female who was  diagnosed with Pneumonia,  admitted on 3/8/17 and discharged on 3/11 to TCU for 8 days then returned home.  Patient ambulates without assist device, has tinetti score of 21 out of 28 .  Patient would benefit from further homecare PT visits 2 week 1, 1 week 1  for gait and transfer training and exercises.

## 2017-03-27 ENCOUNTER — CARE COORDINATION (OUTPATIENT)
Dept: CASE MANAGEMENT | Facility: CLINIC | Age: 82
End: 2017-03-27

## 2017-03-27 NOTE — PROGRESS NOTES
Clinic Care Coordination Contact  Care Team Conversations    Notification that patient was discharged from [facility] with [facility].     Per [facility], the patient's home care  is Damian Montoya RN. Start of care visit planned for 3/28.     Secure e-mail sent to Damian Montoya  with [facility].  I identified my self and asked for progress updates and discharge plan when appropriate.  I will check on home care status in about 3 weeks.     Cherri Skaggs

## 2017-03-29 DIAGNOSIS — F41.9 ANXIETY: ICD-10-CM

## 2017-03-30 ENCOUNTER — DOCUMENTATION ONLY (OUTPATIENT)
Dept: OTHER | Facility: CLINIC | Age: 82
End: 2017-03-30

## 2017-03-30 DIAGNOSIS — Z71.89 ACP (ADVANCE CARE PLANNING): Chronic | ICD-10-CM

## 2017-03-31 RX ORDER — DIAZEPAM 2 MG
TABLET ORAL
Qty: 60 TABLET | Refills: 0 | Status: SHIPPED | OUTPATIENT
Start: 2017-03-31 | End: 2017-05-08

## 2017-03-31 NOTE — TELEPHONE ENCOUNTER
Valium prescription faxed to [facility] pharmacy at [phone].  Marifer Nieves,

## 2017-04-03 ENCOUNTER — MYC MEDICAL ADVICE (OUTPATIENT)
Dept: FAMILY MEDICINE | Facility: CLINIC | Age: 82
End: 2017-04-03

## 2017-04-03 DIAGNOSIS — Z53.9 DIAGNOSIS NOT YET DEFINED: Primary | ICD-10-CM

## 2017-04-03 PROCEDURE — G0180 MD CERTIFICATION HHA PATIENT: HCPCS | Performed by: INTERNAL MEDICINE

## 2017-04-13 NOTE — PROGRESS NOTES
Clinic Care Coordination Contact  Care Team Conversations    This writer received a voicemail from Damian, with [facility]. She noted patient has been discharged from home care, all goals met. (OT/PT/RN have all be discharged)  Damian can be reached at [phone].     Yoanna Minor will review notes and call patient if needed.     Cherri Skaggs

## 2017-04-13 NOTE — PROGRESS NOTES
Clinic Care Coordination Contact  Care Team Conversations    Notification received that patient was discharged from home care today.  Discharge note is not yet complete.  All goals met.  Patient with history of recent fall at home.  Concerns about driving per chart review.       Plan: CC will follow up with patient tomorrow.    LISS Montgomery, RN, PHN  A Care Coordinator  [phone]  April 13, 2017

## 2017-04-14 ENCOUNTER — CARE COORDINATION (OUTPATIENT)
Dept: CASE MANAGEMENT | Facility: CLINIC | Age: 82
End: 2017-04-14

## 2017-04-14 NOTE — PROGRESS NOTES
Clinic Care Coordination Contact  OUTREACH    Referral Information:  Referral Source: Home Care Discharge  Reason for Contact: home care discharge  Care Conference: No     Universal Utilization:   ED Visits in last year: 2  Hospital visits in last year: 1  Last PCP appointment: 03/22/17  Missed Appointments: 0  Concerns: None at this time. All admissions were appropriate  Multiple Providers or Specialists: eye doctor    Clinical Concerns:  Current Medical Concerns: Contacted patient and she reported that she feels stable at home at this time.  Patient has 6 dtrs who live fairly close by and are supportive.  Patient also has 3 sons, 2 who live close by.  Patient reported that dtr and  are transporting her.  Patient denies any recent falls.     Current Behavioral Concerns: Denies acute concerns.     Education Provided to patient: Encouraged patient to contact PCP office if any concerns.    Clinical Pathway Name: None  Clinical Pathway: None    Medication Management:  Patient reported that she is taking her medications as prescribed.      Functional Status:  Mobility Status: Independent w/Device  Equipment Currently Used at Home: cane, straight, walker, rolling  Transportation:  or dtr.            Psychosocial:  Current living arrangement:: I live in a private home with spouse  Financial/Insurance: [facility]  6 dtrs, 3 sons     Resources and Interventions:  Current Resources: Other (Comments); Other (see comment)        Advanced Care Plans/Directives on file:: No  Referrals Placed: Other (None at this time)     Patient/Caregiver understanding: Patient has good understanding of conditions and reported that she will contact PCP with any concerns.   Frequency of Care Coordination: As needed  Upcoming appointment: 05/04/17 (for INR recheck)     Plan: No care coordination needs at this time.     Yusra Navarrete RN   [facility]   [phone]  April 14, 2017

## 2017-05-04 ENCOUNTER — ANTICOAGULATION THERAPY VISIT (OUTPATIENT)
Dept: NURSING | Facility: CLINIC | Age: 82
End: 2017-05-04
Payer: COMMERCIAL

## 2017-05-04 DIAGNOSIS — Z79.01 LONG-TERM (CURRENT) USE OF ANTICOAGULANTS: ICD-10-CM

## 2017-05-04 DIAGNOSIS — I48.0 PAROXYSMAL ATRIAL FIBRILLATION (H): ICD-10-CM

## 2017-05-04 LAB — INR POINT OF CARE: 5 (ref 0.86–1.14)

## 2017-05-04 PROCEDURE — 36416 COLLJ CAPILLARY BLOOD SPEC: CPT

## 2017-05-04 PROCEDURE — 85610 PROTHROMBIN TIME: CPT | Mod: QW

## 2017-05-04 PROCEDURE — 99207 ZZC NO CHARGE NURSE ONLY: CPT

## 2017-05-04 NOTE — PROGRESS NOTES
ANTICOAGULATION FOLLOW-UP CLINIC VISIT    Patient Name:  Shannan Magdaleno  Date:  5/4/2017  Contact Type:  Face to Face    SUBJECTIVE:     Patient Findings     Positives No Problem Findings    Comments Patient had been in the hospital 3/8 for several days for dehydration and pneumonia.           OBJECTIVE    INR Protime   Date Value Ref Range Status   05/04/2017 5.0 (A) 0.86 - 1.14 Final       ASSESSMENT / PLAN  INR assessment SUPRA    Recheck INR In: 1 WEEK    INR Location Clinic      Anticoagulation Summary as of 5/4/2017     INR goal 2.0-3.0   Today's INR 5.0!   Maintenance plan 7.5 mg (5 mg x 1.5) on Wed; 5 mg (5 mg x 1) all other days   Full instructions 5/5: Hold; Otherwise 7.5 mg on Wed; 5 mg all other days   Weekly total 37.5 mg   Plan last modified Alessandra Garcia RN (3/16/2016)   Next INR check 5/12/2017   Priority INR   Target end date     Indications   Paroxysmal atrial fibrillation (H) [I48.0]  Long-term (current) use of anticoagulants [Z79.01] [Z79.01]         Anticoagulation Episode Summary     INR check location     Preferred lab     Send INR reminders to [facility] CL[facility]IC    Comments       Anticoagulation Care Providers     Provider Role Specialty Phone number    Adela Morgan MD [facility] Internal Medicine [phone]            See the Encounter Report to view Anticoagulation Flowsheet and Dosing Calendar (Go to Encounters tab in chart review, and find the Anticoagulation Therapy Visit)    Dosage adjustment made based on physician directed care plan.    Moon Lobato RN

## 2017-05-04 NOTE — MR AVS SNAPSHOT
Shannan Churchillcheco   5/4/2017 9:30 AM   Anticoagulation Therapy Visit    Description:  85 year old female   Provider:  DARA ANTI COAG   Department:  [facility]           INR as of 5/4/2017     Today's INR 5.0!      Anticoagulation Summary as of 5/4/2017     INR goal 2.0-3.0   Today's INR 5.0!   Full instructions 5/5: Hold; Otherwise 7.5 mg on Wed; 5 mg all other days   Next INR check 5/12/2017    Indications   Paroxysmal atrial fibrillation (H) [I48.0]  Long-term (current) use of anticoagulants [Z79.01] [Z79.01]         Your next Anticoagulation Clinic appointment(s)     May 12, 2017 10:00 AM CDT   Anticoagulation Visit with DARA ANTI COAPARUL   [facility] ([facility]    [address] [place] [id number] [phone]              Contact Numbers     [facility]  Please call [phone] to cancel and/or reschedule your appointment   Please call [phone] with any problems or questions regarding your therapy.        May 2017 Details    Sun Mon Tue Wed Thu Fri Sat      1               2               3               4      5 mg   See details      5      Hold         6      5 mg           7      5 mg         8      5 mg         9      5 mg         10      7.5 mg         11      5 mg         12            13                 14               15               16               17               18               19               20                 21               22               23               24               25               26               27                 28               29               30               31                   Date Details   05/04 This INR check       Date of next INR:  5/12/2017         How to take your warfarin dose     To take:  5 mg Take 1 of the 5 mg tablets.    To take:  7.5 mg Take 1.5 of the 5 mg tablets.    Hold Do not take your warfarin dose. See the Details table to the right for additional instructions.

## 2017-05-08 ENCOUNTER — RADIANT APPOINTMENT (OUTPATIENT)
Dept: GENERAL RADIOLOGY | Facility: CLINIC | Age: 82
End: 2017-05-08
Attending: INTERNAL MEDICINE
Payer: COMMERCIAL

## 2017-05-08 ENCOUNTER — OFFICE VISIT (OUTPATIENT)
Dept: FAMILY MEDICINE | Facility: CLINIC | Age: 82
End: 2017-05-08
Payer: COMMERCIAL

## 2017-05-08 VITALS
HEART RATE: 65 BPM | HEIGHT: 62 IN | OXYGEN SATURATION: 96 % | WEIGHT: 148 LBS | TEMPERATURE: 97.2 F | SYSTOLIC BLOOD PRESSURE: 126 MMHG | BODY MASS INDEX: 27.23 KG/M2 | DIASTOLIC BLOOD PRESSURE: 66 MMHG

## 2017-05-08 DIAGNOSIS — D64.9 ANEMIA, UNSPECIFIED TYPE: ICD-10-CM

## 2017-05-08 DIAGNOSIS — J18.9 PNEUMONIA OF LEFT LOWER LOBE DUE TO INFECTIOUS ORGANISM: ICD-10-CM

## 2017-05-08 DIAGNOSIS — L98.9 SKIN LESION OF LEFT LEG: Primary | ICD-10-CM

## 2017-05-08 DIAGNOSIS — I48.0 PAROXYSMAL ATRIAL FIBRILLATION (H): ICD-10-CM

## 2017-05-08 DIAGNOSIS — F41.9 ANXIETY: ICD-10-CM

## 2017-05-08 LAB
BASOPHILS # BLD AUTO: 0 10E9/L (ref 0–0.2)
BASOPHILS NFR BLD AUTO: 0.2 %
DIFFERENTIAL METHOD BLD: NORMAL
EOSINOPHIL # BLD AUTO: 0.4 10E9/L (ref 0–0.7)
EOSINOPHIL NFR BLD AUTO: 4.4 %
ERYTHROCYTE [DISTWIDTH] IN BLOOD BY AUTOMATED COUNT: 14.4 % (ref 10–15)
HCT VFR BLD AUTO: 38.2 % (ref 35–47)
HGB BLD-MCNC: 12.5 G/DL (ref 11.7–15.7)
LYMPHOCYTES # BLD AUTO: 4 10E9/L (ref 0.8–5.3)
LYMPHOCYTES NFR BLD AUTO: 45.7 %
MCH RBC QN AUTO: 31.3 PG (ref 26.5–33)
MCHC RBC AUTO-ENTMCNC: 32.7 G/DL (ref 31.5–36.5)
MCV RBC AUTO: 96 FL (ref 78–100)
MONOCYTES # BLD AUTO: 1 10E9/L (ref 0–1.3)
MONOCYTES NFR BLD AUTO: 11.1 %
NEUTROPHILS # BLD AUTO: 3.4 10E9/L (ref 1.6–8.3)
NEUTROPHILS NFR BLD AUTO: 38.6 %
PLATELET # BLD AUTO: 267 10E9/L (ref 150–450)
RBC # BLD AUTO: 4 10E12/L (ref 3.8–5.2)
WBC # BLD AUTO: 8.8 10E9/L (ref 4–11)

## 2017-05-08 PROCEDURE — 85025 COMPLETE CBC W/AUTO DIFF WBC: CPT | Performed by: INTERNAL MEDICINE

## 2017-05-08 PROCEDURE — 36415 COLL VENOUS BLD VENIPUNCTURE: CPT | Performed by: INTERNAL MEDICINE

## 2017-05-08 PROCEDURE — 99214 OFFICE O/P EST MOD 30 MIN: CPT | Performed by: INTERNAL MEDICINE

## 2017-05-08 PROCEDURE — 71020 XR CHEST 2 VW: CPT

## 2017-05-08 RX ORDER — DIAZEPAM 2 MG
TABLET ORAL
Qty: 60 TABLET | Refills: 1 | Status: SHIPPED | OUTPATIENT
Start: 2017-05-08 | End: 2017-09-22

## 2017-05-08 RX ORDER — METOPROLOL TARTRATE 25 MG/1
50 TABLET, FILM COATED ORAL 2 TIMES DAILY
Qty: 540 TABLET | Refills: 1
Start: 2017-05-08 | End: 2017-09-07

## 2017-05-08 RX ORDER — ESCITALOPRAM OXALATE 10 MG/1
10 TABLET ORAL DAILY
Qty: 90 TABLET | Refills: 3 | Status: SHIPPED | OUTPATIENT
Start: 2017-05-08 | End: 2018-03-01

## 2017-05-08 RX ORDER — MOMETASONE FUROATE 1 MG/G
CREAM TOPICAL
Qty: 15 G | Refills: 0 | Status: SHIPPED | OUTPATIENT
Start: 2017-05-08 | End: 2018-03-05

## 2017-05-08 ASSESSMENT — PAIN SCALES - GENERAL: PAINLEVEL: MODERATE PAIN (4)

## 2017-05-08 NOTE — PROGRESS NOTES
"  SUBJECTIVE:                                                    Shannan Magdaleno is a 85 year old female who presents to clinic today for the following health issues:      Chief Complaint   Patient presents with     Leg Pain     left leg.  Swelling,  Spot that will not heal           Duration: ***    Description (location/character/radiation): ***    Intensity:  {mild,moderate,severe:317821}    Accompanying signs and symptoms: ***    History (similar episodes/previous evaluation): {.:193493::\"None\"}    Precipitating or alleviating factors: {.:428293::\"None\"}    Therapies tried and outcome: {.:162738::\"None\"}       {additional problems for provider to add:485484}    Problem list and histories reviewed & adjusted, as indicated.  Additional history: {NONE - AS DOCUMENTED:499062::\"as documented\"}    {HIST REVIEW/ LINKS 2:375539}    Reviewed and updated as needed this visit by clinical staff  Tobacco  Allergies  Meds  Med Hx  Surg Hx  Fam Hx  Soc Hx      Reviewed and updated as needed this visit by Provider         {PROVIDER CHARTING PREFERENCE:563242}  "

## 2017-05-08 NOTE — PROGRESS NOTES
INTERNAL MEDICINE   SUBJECTIVE:                                                    Shannan Magdaleno is a 85 year old female who presents to clinic today for the following health issues:      Chief Complaint   Patient presents with     Leg Pain     left leg.  Swelling,  Spot that will not heal       Skin: She is accompanied today in clinic by her daughter who provides most of the history. They initially made the appointment with concern for loose stools that have since resolved. She had this issue for about a month before it ceased. There is a spot on her left leg that she is concerned about. Her daughter reports that the spot looked more bloody last week than it is today in clinic. Shannan has tried natural creams which seemed to help the spot a little bit, but swelling and erythema returned to the area. They are concerned that the spot could be related to her varicose veins.     Medication: She continues to use one tablet of Valium at night to sleep. When asked about her balance and dizziness, she reports that there are times she will have some difficulty with walking. Her daughter reports that she has been using a walker to help with mobility and has resumed walking in the mall for exercise. Her daughter adds that Shannan has been taking half a pill of Lexapro instead of a whole tablet.       Problem list and histories reviewed & adjusted, as indicated.  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)     Restless leg syndrome     Hyperlipidemia LDL goal <100     Cataract, mild-mod, ou     Paroxysmal atrial fibrillation (H)     ACP (advance care planning)     Tremor     CAD (coronary artery disease)     Retinal dot hemorrhage or microaneuysm, os     Osteopenia     Insomnia     OA (osteoarthritis) of knee     Retroperitoneal lymphadenopathy     Spinal stenosis of lumbar region  without neurogenic claudication     Lymphoma, small lymphocytic (H)     Long-term (current) use of anticoagulants [Z79.01]     Posterior vitreous detachment, right     Glaucoma suspect, bilateral     Driving safety issue     Past Surgical History:   Procedure Laterality Date     APPENDECTOMY       BREAST BIOPSY, RT/LT  1955    X 2 - benign     C ANESTH,REPAIR LO ABD HERNIA NOS  1995     C REMOVAL GALLBLADDER       CARDIAC CATHERIZATION  1/99 , 12/04    PTCA - Stent X 1       Social History   Substance Use Topics     Smoking status: Never Smoker     Smokeless tobacco: Never Used     Alcohol use No     Family History   Problem Relation Age of Onset     Respiratory Mother      HEART DISEASE Father      Arthritis Sister      Obesity Sister      HEART DISEASE Sister      Hypertension Sister      HEART DISEASE Son      Neurologic Disorder Son      migraines     Arthritis Sister      HEART DISEASE Daughter      HEART DISEASE Daughter      Macular Degeneration Daughter      Neurologic Disorder Daughter      migraines     Neurologic Disorder Daughter      migraines     CANCER Paternal Aunt      Macular Degeneration Daughter      DIABETES No family hx of      CEREBROVASCULAR DISEASE No family hx of      Thyroid Disease No family hx of      Glaucoma No family hx of          Labs reviewed in EPIC    Reviewed and updated as needed this visit by clinical staff  Tobacco  Allergies  Meds  Med Hx  Surg Hx  Fam Hx  Soc Hx      Reviewed and updated as needed this visit by Provider       ROS:  C: NEGATIVE for fever, chills, change in weight  I: POSITIVE for lesion on lower left leg.   CV: NEGATIVE for chest pain, palpitations or peripheral edema  GI: NEGATIVE for nausea, abdominal pain, heartburn, or change in bowel habits  P: NEGATIVE for changes in mood or affect  All other systems reviewed and were negative.      This document serves as a record of the services and decisions personally performed and made by Adela Morgan MD. It  "was created on his/her behalf by Pooja Butler, a trained medical scribe. The creation of this document is based the provider's statements to the medical scribe.    Aron Butler 12:46 PM, May 8, 2017    OBJECTIVE:                                                    /66  Pulse 65  Temp 97.2  F (36.2  C) (Oral)  Ht 1.575 m (5' 2\")  Wt 67.1 kg (148 lb)  SpO2 96%  BMI 27.07 kg/m2  Body mass index is 27.07 kg/(m^2).  GENERAL: healthy, alert and no distress  RESP: lungs clear to auscultation - no rales, rhonchi or wheezes  CV: regular rate and rhythm, normal S1 S2, no S3 or S4, no murmur, click or rub, no peripheral edema and peripheral pulses strong  MS: no gross musculoskeletal defects noted, no edema  SKIN: Lower left extremity there was a dime-sized slightly elevated irregular erythematous lesion. Varicose veins on the left leg.   PSYCH: mentation appears normal, affect normal/bright    Diagnostic Test Results:  No results found for this or any previous visit (from the past 24 hour(s)).  Results for orders placed or performed in visit on 05/04/17   INR point of care   Result Value Ref Range    INR Protime 5.0 (A) 0.86 - 1.14        ASSESSMENT/PLAN:                                                    I spent 12 minutes of time with the patient and >50% of it was in education and counseling regarding preventive healthcare.     1. Anxiety- try to drop the dose of valium to avoid falls.     The current medical regimen is effective; continue present plan and medications    - diazepam (VALIUM) 2 MG tablet; TAKE 1/2 -  1 TABLET BY MOUTH AT BEDTIME FOR ANXIETY  Dispense: 60 tablet; Refill: 1  - escitalopram (LEXAPRO) 10 MG tablet; Take 1 tablet (10 mg) by mouth daily  Dispense: 90 tablet; Refill: 3    2. Skin lesion of left leg   Patient to start using cream. If there is no improvement in her symptoms in 1 week, then she will schedule an appointment with dermatology.   - mometasone (ELOCON) 0.1 % " cream; Apply sparingly to affected area twice daily for 1 week.  Dispense: 15 g; Refill: 0  - DERMATOLOGY REFERRAL    3. Pneumonia of left lower lobe due to infectious organism  Repeat xray is due   - XR Chest 2 Views    4. Paroxysmal atrial fibrillation (H)  Discussed with patient need for consistent dietary intake.   The current medical regimen is effective; continue present plan and medications    - metoprolol (LOPRESSOR) 25 MG tablet; Take 2 tablets (50 mg) by mouth 2 times daily  Dispense: 540 tablet; Refill: 1    5. Anemia, unspecified type    - CBC with platelets differential    Patient Instructions   - Start using steroid cream twice daily for one week. If there is no improvement within one week, then I will refer you to a dermatologist.   - Start having one serving of greens per day.   - Follow up with me in 6 months or sooner if needed.       The information in this document, created by the medical scribe for me, accurately reflects the services I personally performed and the decisions made by me. I have reviewed and approved this document for accuracy prior to leaving the patient care area.  Adela Morgan MD  12:46 PM, 05/08/17    Adela Morgan MD  [facility]    Start: 12:54 PM   End: 1:06 PM

## 2017-05-08 NOTE — PATIENT INSTRUCTIONS
- Start using steroid cream twice daily for one week. If there is no improvement within one week, then I will refer you to a dermatologist.   - Start having one serving of greens per day.   - Follow up with me in 6 months or sooner if needed.     [facility]    If you have any questions regarding to your visit please contact your care team:     Team Pink:   Clinic Hours Telephone Number   Internal Medicine:  Dr. Adela Yin, NP       7am-7pm  Monday - Thursday   7am-5pm  Fridays  [phone]  (Appointment scheduling available 24/7)    Questions about your visit?  Team Line  [phone]   Urgent Care - [place] - 11am-9pm Monday-Friday Saturday-Sunday- 9am-5pm   [place] - 5pm-9pm Monday-Friday Saturday-Sunday- 9am-5pm  [phone] - [place]   [phone] - [place]       What options do I have for visits at the clinic other than the traditional office visit?  To expand how we care for you, many of our providers are utilizing electronic visits (e-visits) and telephone visits, when medically appropriate, for interactions with their patients rather than a visit in the clinic.   We also offer nurse visits for many medical concerns. Just like any other service, we will bill your insurance company for this type of visit based on time spent on the phone with your provider. Not all insurance companies cover these visits. Please check with your medical insurance if this type of visit is covered. You will be responsible for any charges that are not paid by your insurance.      E-visits via TuTanda:  generally incur a $35.00 fee.  Telephone visits:  Time spent on the phone: *charged based on time that is spent on the phone in increments of 10 minutes. Estimated cost:   5-10 mins $30.00   11-20 mins. $59.00   21-30 mins. $85.00   Use TuTanda (secure email communication and access to your chart) to send your primary care provider a message  or make an appointment. Ask someone on your Team how to sign up for Embracet.    For a Price Quote for your services, please call our Consumer Price Line at [phone].    As always, Thank you for trusting us with your health care needs!    Discharged By:  BRIDGETTE LANDEROS

## 2017-05-08 NOTE — MR AVS SNAPSHOT
After Visit Summary   5/8/2017    Shannan Magdaleno    MRN: [id number]           Patient Information     Date Of Birth          12/2/1931        Visit Information        Provider Department      5/8/2017 12:15 PM Adela Morgan MD [facility]        Today's Diagnoses     Skin lesion of left leg    -  1    Anxiety        Pneumonia of left lower lobe due to infectious organism        Paroxysmal atrial fibrillation (H)        Anemia, unspecified type          Care Instructions    - Start using steroid cream twice daily for one week. If there is no improvement within one week, then I will refer you to a dermatologist.   - Start having one serving of greens per day.   - Follow up with me in 6 months or sooner if needed.     [facility]    If you have any questions regarding to your visit please contact your care team:     Team Pink:   Clinic Hours Telephone Number   Internal Medicine:  Dr. Adela Yin, NP       7am-7pm  Monday - Thursday   7am-5pm  Fridays  [phone]  (Appointment scheduling available 24/7)    Questions about your visit?  Team Line  [phone]   Urgent Care - [place] [facility] - 11am-9pm Monday-Friday Saturday-Sunday- 9am-5pm   [place] - 5pm-9pm Monday-Friday Saturday-Sunday- 9am-5pm  [phone] - [place]   [phone] - [place]       What options do I have for visits at the clinic other than the traditional office visit?  To expand how we care for you, many of our providers are utilizing electronic visits (e-visits) and telephone visits, when medically appropriate, for interactions with their patients rather than a visit in the clinic.   We also offer nurse visits for many medical concerns. Just like any other service, we will bill your insurance company for this type of visit based on time spent on the phone with your provider. Not all insurance companies cover these visits. Please check  with your medical insurance if this type of visit is covered. You will be responsible for any charges that are not paid by your insurance.      E-visits via Coupmonhart:  generally incur a $35.00 fee.  Telephone visits:  Time spent on the phone: *charged based on time that is spent on the phone in increments of 10 minutes. Estimated cost:   5-10 mins $30.00   11-20 mins. $59.00   21-30 mins. $85.00   Use Neocutis (secure email communication and access to your chart) to send your primary care provider a message or make an appointment. Ask someone on your Team how to sign up for Neocutis.    For a Price Quote for your services, please call our [employer] Line at [phone].    As always, Thank you for trusting us with your health care needs!    Discharged By:  BRIDGETTE LANDEROS          Follow-ups after your visit        Additional Services     DERMATOLOGY REFERRAL       Your provider has referred you to: [facility] [place] (2 locations) [phone]   [email or web address]    Please be aware that coverage of these services is subject to the terms and limitations of your health insurance plan.  Call member services at your health plan with any benefit or coverage questions.      Please bring the following with you to your appointment:    (1) Any X-Rays, CTs or MRIs which have been performed.  Contact the facility where they were done to arrange for  prior to your scheduled appointment.    (2) List of current medications  (3) This referral request   (4) Any documents/labs given to you for this referral                  Your next 10 appointments already scheduled     May 12, 2017 10:00 AM CDT   Anticoagulation Visit with ABIGAIL ANTI COAPARUL   [facility] [place] ([facility] [place])    [address]  [place] [address]   [phone]            May 18, 2017 10:45 AM CDT   Return Visit with Jamari Stewart MD   [facility] [place] [facility]    [address]  [place] [address] [phone]              Who to contact     If you have questions or need follow up information about today's clinic visit or your schedule please contact [facility] directly at [phone].  Normal or non-critical lab and imaging results will be communicated to you by MyChart, letter or phone within 4 business days after the clinic has received the results. If you do not hear from us within 7 days, please contact the clinic through Fooalahart or phone. If you have a critical or abnormal lab result, we will notify you by phone as soon as possible.  Submit refill requests through Amicus or call your pharmacy and they will forward the refill request to us. Please allow 3 business days for your refill to be completed.          Additional Information About Your Visit        Fooalahart Information     Amicus gives you secure access to your electronic health record. If you see a primary care provider, you can also send messages to your care team and make appointments. If you have questions, please call your primary care clinic.  If you do not have a primary care provider, please call [phone] and they will assist you.        Care EveryWhere ID     This is your Care EveryWhere ID. This could be used by other organizations to access your [facility] medical records  [id number]        Your Vitals Were     Pulse Temperature Height Pulse Oximetry BMI (Body Mass Index)       65 97.2  F (36.2  C) (Oral) 5' 2\" (1.575 m) 96% 27.07 kg/m2        Blood Pressure from Last 3 Encounters:   05/08/17 126/66   03/22/17 124/64   01/25/17 136/73    Weight from Last 3 Encounters:   05/08/17 148 lb (67.1 kg)   03/22/17 147 lb 8 oz (66.9 kg)   01/25/17 147 lb (66.7 kg)              We Performed the Following     CBC with platelets differential     DERMATOLOGY REFERRAL     XR Chest 2 Views          Today's Medication Changes          These changes are accurate as of: 5/8/17  1:08 PM.  If " you have any questions, ask your nurse or doctor.               Start taking these medicines.        Dose/Directions    mometasone 0.1 % cream   Commonly known as:  ELOCON   Used for:  Skin lesion of left leg   Started by:  Adela Morgan MD        Apply sparingly to affected area twice daily for 1 week.   Quantity:  15 g   Refills:  0         These medicines have changed or have updated prescriptions.        Dose/Directions    diazepam 2 MG tablet   Commonly known as:  VALIUM   This may have changed:  See the new instructions.   Used for:  Anxiety   Changed by:  Adela Morgan MD        TAKE 1/2 -  1 TABLET BY MOUTH AT BEDTIME FOR ANXIETY   Quantity:  60 tablet   Refills:  1       escitalopram 10 MG tablet   Commonly known as:  LEXAPRO   This may have changed:  how much to take   Used for:  Anxiety   Changed by:  Adela Morgan MD        Dose:  10 mg   Take 1 tablet (10 mg) by mouth daily   Quantity:  90 tablet   Refills:  3       metoprolol 25 MG tablet   Commonly known as:  LOPRESSOR   This may have changed:  how much to take   Used for:  Paroxysmal atrial fibrillation (H)   Changed by:  Adela Morgan MD        Dose:  50 mg   Take 2 tablets (50 mg) by mouth 2 times daily   Quantity:  540 tablet   Refills:  1            Where to get your medicines      These medications were sent to [facility]/pharmacy #[id number] - [place], [place] [address] [address] [place] [address], [facility] [address]     Phone:  [phone]     escitalopram 10 MG tablet    mometasone 0.1 % cream         Some of these will need a paper prescription and others can be bought over the counter.  Ask your nurse if you have questions.     Bring a paper prescription for each of these medications     diazepam 2 MG tablet       You don't need a prescription for these medications     metoprolol 25 MG tablet                Primary Care Provider Office Phone # Fax #    Adela Morgan [phone]        [facility] [address]  [place] [address]        Thank you!     Thank you for choosing [facility]  for your care. Our goal is always to provide you with excellent care. Hearing back from our patients is one way we can continue to improve our services. Please take a few minutes to complete the written survey that you may receive in the mail after your visit with us. Thank you!             Your Updated Medication List - Protect others around you: Learn how to safely use, store and throw away your medicines at www.disposemymeds.org.          This list is accurate as of: 5/8/17  1:08 PM.  Always use your most recent med list.                   Brand Name Dispense Instructions for use    calcium-vitamin D 600-400 MG-UNIT per tablet    CALTRATE     Take 1 tablet by mouth daily       CENTRUM SILVER per tablet     30 tablet    Take 1 tablet by mouth daily Has 2000 IU of Vitamin D in it.       diazepam 2 MG tablet    VALIUM    60 tablet    TAKE 1/2 -  1 TABLET BY MOUTH AT BEDTIME FOR ANXIETY       escitalopram 10 MG tablet    LEXAPRO    90 tablet    Take 1 tablet (10 mg) by mouth daily       metoprolol 25 MG tablet    LOPRESSOR    540 tablet    Take 2 tablets (50 mg) by mouth 2 times daily       mometasone 0.1 % cream    ELOCON    15 g    Apply sparingly to affected area twice daily for 1 week.       nitroglycerin 0.4 MG sublingual tablet    NITROSTAT    25 tablet    Place 1 tablet (0.4 mg) under the tongue every 5 minutes as needed for chest pain       simvastatin 40 MG tablet    ZOCOR    90 tablet    TAKE 1 TABLET (40 MG) BY MOUTH AT BEDTIME       TYLENOL 500 MG tablet   Generic drug:  acetaminophen      Take 1-2 tablets by mouth every 6 hours as needed.       warfarin 5 MG tablet    COUMADIN    90 tablet    TAKE 1 & 1/2 TABLETS ON WED, AND 1 TAB REST OF WEEK

## 2017-05-08 NOTE — NURSING NOTE
"Chief Complaint   Patient presents with     Leg Pain     left leg.  Swelling,  Spot that will not heal       Initial /66  Pulse 65  Temp 97.2  F (36.2  C) (Oral)  Ht 5' 2\" (1.575 m)  Wt 148 lb (67.1 kg)  SpO2 96%  BMI 27.07 kg/m2 Estimated body mass index is 27.07 kg/(m^2) as calculated from the following:    Height as of this encounter: 5' 2\" (1.575 m).    Weight as of this encounter: 148 lb (67.1 kg).  Medication Reconciliation: complete   Macie Damico MA    "

## 2017-05-12 ENCOUNTER — ANTICOAGULATION THERAPY VISIT (OUTPATIENT)
Dept: NURSING | Facility: CLINIC | Age: 82
End: 2017-05-12
Payer: COMMERCIAL

## 2017-05-12 DIAGNOSIS — Z79.01 LONG-TERM (CURRENT) USE OF ANTICOAGULANTS: ICD-10-CM

## 2017-05-12 DIAGNOSIS — I48.0 PAROXYSMAL ATRIAL FIBRILLATION (H): ICD-10-CM

## 2017-05-12 LAB — INR POINT OF CARE: 3.4 (ref 0.86–1.14)

## 2017-05-12 PROCEDURE — 85610 PROTHROMBIN TIME: CPT | Mod: QW

## 2017-05-12 PROCEDURE — 99207 ZZC NO CHARGE NURSE ONLY: CPT

## 2017-05-12 PROCEDURE — 36416 COLLJ CAPILLARY BLOOD SPEC: CPT

## 2017-05-12 NOTE — PROGRESS NOTES
ANTICOAGULATION FOLLOW-UP CLINIC VISIT    Patient Name:  Shannan Magdaleno  Date:  5/12/2017  Contact Type:  Face to Face    SUBJECTIVE:     Patient Findings     Positives No Problem Findings    Comments Was in the hospital for a fall and also had pneumonia and dehydration           OBJECTIVE    INR Protime   Date Value Ref Range Status   05/12/2017 3.4 (A) 0.86 - 1.14 Final       ASSESSMENT / PLAN  INR assessment SUPRA    Recheck INR In: 1 WEEK    INR Location Clinic      Anticoagulation Summary as of 5/12/2017     INR goal 2.0-3.0   Today's INR 3.4!   Maintenance plan 7.5 mg (5 mg x 1.5) on Wed; 5 mg (5 mg x 1) all other days   Full instructions 5/17: 5 mg; 5/24: 5 mg; Otherwise 7.5 mg on Wed; 5 mg all other days   Weekly total 37.5 mg   Plan last modified Alessandra Garcia RN (3/16/2016)   Next INR check 5/19/2017   Priority INR   Target end date     Indications   Paroxysmal atrial fibrillation (H) [I48.0]  Long-term (current) use of anticoagulants [Z79.01] [Z79.01]         Anticoagulation Episode Summary     INR check location     Preferred lab     Send INR reminders to [facility] CLINIC    Comments       Anticoagulation Care Providers     Provider Role Specialty Phone number    Adela Morgan MD [facility] Internal Medicine [phone]            See the Encounter Report to view Anticoagulation Flowsheet and Dosing Calendar (Go to Encounters tab in chart review, and find the Anticoagulation Therapy Visit)    Dosage adjustment made based on physician directed care plan. Reviewed both bleeding and clotting signs and symptoms with patient this visit. Pt. has no further questions or concerns.  Will call with any changes to diet, medications, or missed doses at INR line [phone].      Shama Chandra, RN

## 2017-05-12 NOTE — MR AVS SNAPSHOT
Shannan TERAN Rasta   5/12/2017 10:00 AM   Anticoagulation Therapy Visit    Description:  85 year old female   Provider:  DARA ANTI COAG   Department:  [facility]           INR as of 5/12/2017     Today's INR 3.4!      Anticoagulation Summary as of 5/12/2017     INR goal 2.0-3.0   Today's INR 3.4!   Full instructions 5/17: 5 mg; 5/24: 5 mg; Otherwise 7.5 mg on Wed; 5 mg all other days   Next INR check 5/19/2017    Indications   Paroxysmal atrial fibrillation (H) [I48.0]  Long-term (current) use of anticoagulants [Z79.01] [Z79.01]         Your next Anticoagulation Clinic appointment(s)     May 19, 2017  9:45 AM CDT   Anticoagulation Visit with DARA ANTI DEBRA   [facility] ([address] [address] [phone]              Contact Numbers     [facility]  Please call [phone] to cancel and/or reschedule your appointment   Please call [phone] with any problems or questions regarding your therapy.        May 2017 Details    Sun Mon Tue Wed Thu Fri Sat      1               2               3               4               5               6                 7               8               9               10               11               12      5 mg   See details      13      5 mg           14      5 mg         15      5 mg         16      5 mg         17      5 mg         18      5 mg         19            20                 21               22               23               24               25               26               27                 28               29               30               31                   Date Details   05/12 This INR check       Date of next INR:  5/19/2017         How to take your warfarin dose     To take:  5 mg Take 1 of the 5 mg tablets.

## 2017-05-18 ENCOUNTER — OFFICE VISIT (OUTPATIENT)
Dept: OPHTHALMOLOGY | Facility: CLINIC | Age: 82
End: 2017-05-18
Payer: COMMERCIAL

## 2017-05-18 DIAGNOSIS — H40.003 GLAUCOMA SUSPECT, BILATERAL: Primary | ICD-10-CM

## 2017-05-18 PROCEDURE — 92133 CPTRZD OPH DX IMG PST SGM ON: CPT | Performed by: OPHTHALMOLOGY

## 2017-05-18 PROCEDURE — 92083 EXTENDED VISUAL FIELD XM: CPT | Performed by: OPHTHALMOLOGY

## 2017-05-18 PROCEDURE — 92012 INTRM OPH EXAM EST PATIENT: CPT | Performed by: OPHTHALMOLOGY

## 2017-05-18 ASSESSMENT — VISUAL ACUITY
OS_CC+: -2
OS_CC: 20/30
OD_CC+: -2
CORRECTION_TYPE: GLASSES
METHOD: SNELLEN - LINEAR
OD_CC: 20/30

## 2017-05-18 ASSESSMENT — EXTERNAL EXAM - RIGHT EYE: OD_EXAM: PROLAPSED FAT PADS: UPPER, LOWER

## 2017-05-18 ASSESSMENT — EXTERNAL EXAM - LEFT EYE: OS_EXAM: PROLAPSED FAT PADS: UPPER, LOWER

## 2017-05-18 ASSESSMENT — SLIT LAMP EXAM - LIDS
COMMENTS: 1+ DERMATOCHALASIS - UPPER LID, 1+ SCLERAL SHOW
COMMENTS: 1+ DERMATOCHALASIS - UPPER LID, 1+ SCLERAL SHOW

## 2017-05-18 ASSESSMENT — TONOMETRY
OD_IOP_MMHG: 17
OS_IOP_MMHG: 18
IOP_METHOD: APPLANATION

## 2017-05-18 NOTE — PROGRESS NOTES
Current Eye Medications:  none     Subjective:  Follow up glaucoma suspect.  Patient is here for a pressure check, OCT, and visual field.  No vision changes or concerns.  I observe a subconjunctival hemorrhage medially, right eye - patient states this occurred a while ago, then resolved.       Objective:  See Ophthalmology Exam.       Assessment: Stable intraocular pressure and normal glaucoma OCT and [place] Visual Field both eyes in patient who is a glaucoma suspect.       Plan: Continue observation with regard to glaucoma suspect status.  Return visit 6 months for a complete exam .  Jamari Stewart M.D.

## 2017-05-18 NOTE — MR AVS SNAPSHOT
After Visit Summary   5/18/2017    Shannan Magdaleno    MRN: [id number]           Patient Information     Date Of Birth          12/2/1931        Visit Information        Provider Department      5/18/2017 10:45 AM Jamari Stewart MD [facility]        Today's Diagnoses     Glaucoma suspect, bilateral    -  1      Care Instructions    Continue observation with regard to glaucoma suspect status.  Return visit 6 months for a complete exam .  Jamari Stewart M.D.          Follow-ups after your visit        Your next 10 appointments already scheduled     May 19, 2017  9:45 AM CDT   Anticoagulation Visit with FZ ANTI COAG   [facility] ([facility])    [address] [address] [phone]              Who to contact     If you have questions or need follow up information about today's clinic visit or your schedule please contact [facility] directly at [phone].  Normal or non-critical lab and imaging results will be communicated to you by Cheershart, letter or phone within 4 business days after the clinic has received the results. If you do not hear from us within 7 days, please contact the clinic through Cheershart or phone. If you have a critical or abnormal lab result, we will notify you by phone as soon as possible.  Submit refill requests through Sportomato or call your pharmacy and they will forward the refill request to us. Please allow 3 business days for your refill to be completed.          Additional Information About Your Visit        MyChart Information     Sportomato gives you secure access to your electronic health record. If you see a primary care provider, you can also send messages to your care team and make appointments. If you have questions, please call your primary care clinic.  If you do not have a primary care provider, please call [phone] and they will assist you.        Care EveryWhere ID     This is your  Care EveryWhere ID. This could be used by other organizations to access your [place] medical records  [id number]         Blood Pressure from Last 3 Encounters:   05/08/17 126/66   03/22/17 124/64   01/25/17 136/73    Weight from Last 3 Encounters:   05/08/17 67.1 kg (148 lb)   03/22/17 66.9 kg (147 lb 8 oz)   01/25/17 66.7 kg (147 lb)              We Performed the Following     HC COMPUTERIZED OPHTHALMIC IMAGING OPTIC NERVE     VISUAL FIELDS EXAM (EXTENDED)        Primary Care Provider Office Phone # Fax #    Adela Morgan [phone]       [facility] [address] [place] [id number]        Thank you!     Thank you for choosing [facility]  for your care. Our goal is always to provide you with excellent care. Hearing back from our patients is one way we can continue to improve our services. Please take a few minutes to complete the written survey that you may receive in the mail after your visit with us. Thank you!             Your Updated Medication List - Protect others around you: Learn how to safely use, store and throw away your medicines at www.disposemymeds.org.          This list is accurate as of: 5/18/17 12:00 PM.  Always use your most recent med list.                   Brand Name Dispense Instructions for use    calcium-vitamin D 600-400 MG-UNIT per tablet    CALTRATE     Take 1 tablet by mouth daily       CENTRUM SILVER per tablet     30 tablet    Take 1 tablet by mouth daily Has 2000 IU of Vitamin D in it.       diazepam 2 MG tablet    VALIUM    60 tablet    TAKE 1/2 -  1 TABLET BY MOUTH AT BEDTIME FOR ANXIETY       escitalopram 10 MG tablet    LEXAPRO    90 tablet    Take 1 tablet (10 mg) by mouth daily       metoprolol 25 MG tablet    LOPRESSOR    540 tablet    Take 2 tablets (50 mg) by mouth 2 times daily       mometasone 0.1 % cream    ELOCON    15 g    Apply sparingly to affected area twice daily for 1 week.       nitroglycerin 0.4 MG  sublingual tablet    NITROSTAT    25 tablet    Place 1 tablet (0.4 mg) under the tongue every 5 minutes as needed for chest pain       simvastatin 40 MG tablet    ZOCOR    90 tablet    TAKE 1 TABLET (40 MG) BY MOUTH AT BEDTIME       TYLENOL 500 MG tablet   Generic drug:  acetaminophen      Take 1-2 tablets by mouth every 6 hours as needed.       warfarin 5 MG tablet    COUMADIN    90 tablet    TAKE 1 & 1/2 TABLETS ON WED, AND 1 TAB REST OF WEEK

## 2017-05-18 NOTE — PATIENT INSTRUCTIONS
Continue observation with regard to glaucoma suspect status.  Return visit 6 months for a complete exam .  Jamari Stewart M.D.

## 2017-05-19 ENCOUNTER — ANTICOAGULATION THERAPY VISIT (OUTPATIENT)
Dept: NURSING | Facility: CLINIC | Age: 82
End: 2017-05-19
Payer: COMMERCIAL

## 2017-05-19 DIAGNOSIS — I48.0 PAROXYSMAL ATRIAL FIBRILLATION (H): ICD-10-CM

## 2017-05-19 DIAGNOSIS — Z79.01 LONG-TERM (CURRENT) USE OF ANTICOAGULANTS: ICD-10-CM

## 2017-05-19 LAB — INR POINT OF CARE: 3.3 (ref 0.86–1.14)

## 2017-05-19 PROCEDURE — 85610 PROTHROMBIN TIME: CPT | Mod: QW

## 2017-05-19 PROCEDURE — 36416 COLLJ CAPILLARY BLOOD SPEC: CPT

## 2017-05-19 PROCEDURE — 99207 ZZC NO CHARGE NURSE ONLY: CPT

## 2017-05-19 NOTE — MR AVS SNAPSHOT
Shannan TERAN Rasta   5/19/2017 9:45 AM   Anticoagulation Therapy Visit    Description:  85 year old female   Provider:  DARA ANTI COAG   Department:  [facility]           INR as of 5/19/2017     Today's INR 3.3!      Anticoagulation Summary as of 5/19/2017     INR goal 2.0-3.0   Today's INR 3.3!   Full instructions 5/24: 5 mg; 5/31: 5 mg; Otherwise 5 mg every day   Next INR check 6/2/2017    Indications   Paroxysmal atrial fibrillation (H) [I48.0]  Long-term (current) use of anticoagulants [Z79.01] [Z79.01]         Your next Anticoagulation Clinic appointment(s)     May 19, 2017  9:45 AM CDT   Anticoagulation Visit with DARA ANTI COAG   [facility] ([address] [address] [phone]            Jun 02, 2017  9:45 AM CDT   Anticoagulation Visit with DARA ANTI COAG   [facility] ([address] [address] [phone]              Contact Numbers     [facility]  Please call [phone] to cancel and/or reschedule your appointment   Please call [phone] with any problems or questions regarding your therapy.        May 2017 Details    Sun Mon Tue Wed Thu Fri Sat      1               2               3               4               5               6                 7               8               9               10               11               12               13                 14               15               16               17               18               19      5 mg   See details      20      5 mg           21      5 mg         22      5 mg         23      5 mg         24      5 mg         25      5 mg         26      5 mg         27      5 mg           28      5 mg         29      5 mg         30      5 mg         31      5 mg             Date Details   05/19 This INR check               How to take your warfarin dose     To take:  5 mg Take 1 of the 5 mg tablets.           June  2017 Details    Sun Mon Tue Wed Thu Fri Sat         1      5 mg         2            3                 4               5               6               7               8               9               10                 11               12               13               14               15               16               17                 18               19               20               21               22               23               24                 25               26               27               28               29               30                 Date Details   No additional details    Date of next INR:  6/2/2017         How to take your warfarin dose     To take:  5 mg Take 1 of the 5 mg tablets.

## 2017-05-19 NOTE — PROGRESS NOTES
ANTICOAGULATION FOLLOW-UP CLINIC VISIT    Patient Name:  Shannan Magdaleno  Date:  5/19/2017  Contact Type:  Face to Face    SUBJECTIVE:     Patient Findings     Positives No Problem Findings           OBJECTIVE    INR Protime   Date Value Ref Range Status   05/19/2017 3.3 (A) 0.86 - 1.14 Final       ASSESSMENT / PLAN  INR assessment THER    Recheck INR In: 2 WEEKS    INR Location Clinic      Anticoagulation Summary as of 5/19/2017     INR goal 2.0-3.0   Today's INR 3.3!   Maintenance plan 5 mg (5 mg x 1) every day   Full instructions 5/24: 5 mg; 5/31: 5 mg; Otherwise 5 mg every day   Weekly total 35 mg   Plan last modified Shama Chandra RN (5/19/2017)   Next INR check 6/2/2017   Priority INR   Target end date     Indications   Paroxysmal atrial fibrillation (H) [I48.0]  Long-term (current) use of anticoagulants [Z79.01] [Z79.01]         Anticoagulation Episode Summary     INR check location     Preferred lab     Send INR reminders to [facility] CL[facility]    Comments       Anticoagulation Care Providers     Provider Role Specialty Phone number    Adela Morgan MD [facility] Internal Medicine [phone]            See the Encounter Report to view Anticoagulation Flowsheet and Dosing Calendar (Go to Encounters tab in chart review, and find the Anticoagulation Therapy Visit)    Dosage adjustment made based on physician directed care plan. Reviewed both bleeding and clotting signs and symptoms with patient this visit. Pt. has no further questions or concerns.  Will call with any changes to diet, medications, or missed doses at INR line [phone].      Shama Chandra, RN

## 2017-05-20 ASSESSMENT — PACHYMETRY
OS_CT(UM): 569
EXAM_DATE: 1/2006
OD_CT(UM): 576

## 2017-06-05 DIAGNOSIS — F41.9 ANXIETY: ICD-10-CM

## 2017-06-05 NOTE — TELEPHONE ENCOUNTER
Controlled Substance Refill Request for Valium   Problem List Complete:  Yes   checked in past 6 months?  YES

## 2017-06-06 RX ORDER — DIAZEPAM 2 MG
TABLET ORAL
Qty: 60 TABLET | Refills: 0 | OUTPATIENT
Start: 2017-06-06

## 2017-06-06 NOTE — TELEPHONE ENCOUNTER
Called pharmacy to confirm duplicate order.  Dora Murrieta CMA ([facility])

## 2017-06-09 ENCOUNTER — TELEPHONE (OUTPATIENT)
Dept: FAMILY MEDICINE | Facility: CLINIC | Age: 82
End: 2017-06-09

## 2017-06-09 ENCOUNTER — ANTICOAGULATION THERAPY VISIT (OUTPATIENT)
Dept: NURSING | Facility: CLINIC | Age: 82
End: 2017-06-09
Payer: COMMERCIAL

## 2017-06-09 DIAGNOSIS — Z79.01 LONG-TERM (CURRENT) USE OF ANTICOAGULANTS: ICD-10-CM

## 2017-06-09 DIAGNOSIS — I48.0 PAROXYSMAL ATRIAL FIBRILLATION (H): ICD-10-CM

## 2017-06-09 DIAGNOSIS — Z79.01 LONG-TERM (CURRENT) USE OF ANTICOAGULANTS: Primary | ICD-10-CM

## 2017-06-09 LAB — INR POINT OF CARE: 3.4 (ref 0.86–1.14)

## 2017-06-09 PROCEDURE — 99207 ZZC NO CHARGE NURSE ONLY: CPT

## 2017-06-09 PROCEDURE — 85610 PROTHROMBIN TIME: CPT | Mod: QW

## 2017-06-09 PROCEDURE — 36416 COLLJ CAPILLARY BLOOD SPEC: CPT

## 2017-06-09 NOTE — PROGRESS NOTES
ANTICOAGULATION FOLLOW-UP CLINIC VISIT    Patient Name:  Shannan Magdaleno  Date:  6/9/2017  Contact Type:  Face to Face    SUBJECTIVE:     Patient Findings     Positives Extra doses, No Problem Findings    Comments Patient has been taking 7.5 mg on Wednesdays instead of 5 mg.            OBJECTIVE    INR Protime   Date Value Ref Range Status   06/09/2017 3.4 (A) 0.86 - 1.14 Final       ASSESSMENT / PLAN  No question data found.  Anticoagulation Summary as of 6/9/2017     INR goal 2.0-3.0   Today's INR 3.4!   Maintenance plan 5 mg (5 mg x 1) every day   Full instructions 5 mg every day   Weekly total 35 mg   No change documented Moon Lobato, RN   Plan last modified Shama Chandra RN (5/19/2017)   Next INR check 6/23/2017   Priority INR   Target end date     Indications   Paroxysmal atrial fibrillation (H) [I48.0]  Long-term (current) use of anticoagulants [Z79.01] [Z79.01]         Anticoagulation Episode Summary     INR check location     Preferred lab     Send INR reminders to [facility] CL[facility]    Comments       Anticoagulation Care Providers     Provider Role Specialty Phone number    Adela Morgan MD Responsible Internal Medicine [phone]            See the Encounter Report to view Anticoagulation Flowsheet and Dosing Calendar (Go to Encounters tab in chart review, and find the Anticoagulation Therapy Visit)    Dosage adjustment made based on physician directed care plan.    Moon Lobato, RN

## 2017-06-09 NOTE — TELEPHONE ENCOUNTER
Annual INR referral needed. Orders teed up.  Please sign if you are in agreement.    Moon Lobato RN - BC

## 2017-06-09 NOTE — MR AVS SNAPSHOT
Shannan TERAN Rasta   6/9/2017 10:15 AM   Anticoagulation Therapy Visit    Description:  85 year old female   Provider:  DARA ANTI COAG   Department:  [facility] Nurse           INR as of 6/9/2017     Today's INR 3.4!      Anticoagulation Summary as of 6/9/2017     INR goal 2.0-3.0   Today's INR 3.4!   Full instructions 5 mg every day   Next INR check 6/23/2017    Indications   Paroxysmal atrial fibrillation (H) [I48.0]  Long-term (current) use of anticoagulants [Z79.01] [Z79.01]         Your next Anticoagulation Clinic appointment(s)     Jun 23, 2017  9:15 AM CDT   Anticoagulation Visit with DARA ANTI DEBRA   [facility] ([facility]    [address] [address] [phone]              Contact Numbers     [facility]  Please call [phone] to cancel and/or reschedule your appointment   Please call [phone] with any problems or questions regarding your therapy.        June 2017 Details    Sun Mon Tue Wed Thu Fri Sat         1               2               3                 4               5               6               7               8               9      5 mg   See details      10      5 mg           11      5 mg         12      5 mg         13      5 mg         14      5 mg         15      5 mg         16      5 mg         17      5 mg           18      5 mg         19      5 mg         20      5 mg         21      5 mg         22      5 mg         23            24                 25               26               27               28               29               30                 Date Details   06/09 This INR check       Date of next INR:  6/23/2017         How to take your warfarin dose     To take:  5 mg Take 1 of the 5 mg tablets.

## 2017-06-23 ENCOUNTER — ANTICOAGULATION THERAPY VISIT (OUTPATIENT)
Dept: NURSING | Facility: CLINIC | Age: 82
End: 2017-06-23
Payer: COMMERCIAL

## 2017-06-23 DIAGNOSIS — Z79.01 LONG-TERM (CURRENT) USE OF ANTICOAGULANTS: ICD-10-CM

## 2017-06-23 DIAGNOSIS — I48.0 PAROXYSMAL ATRIAL FIBRILLATION (H): ICD-10-CM

## 2017-06-23 LAB — INR POINT OF CARE: 2.6 (ref 0.86–1.14)

## 2017-06-23 PROCEDURE — 85610 PROTHROMBIN TIME: CPT | Mod: QW

## 2017-06-23 PROCEDURE — 99207 ZZC NO CHARGE NURSE ONLY: CPT

## 2017-06-23 PROCEDURE — 36416 COLLJ CAPILLARY BLOOD SPEC: CPT

## 2017-06-23 NOTE — PROGRESS NOTES
ANTICOAGULATION FOLLOW-UP CLINIC VISIT    Patient Name:  Shannan Magdaleno  Date:  6/23/2017  Contact Type:  Face to Face    SUBJECTIVE:     Patient Findings     Positives No Problem Findings           OBJECTIVE    INR Protime   Date Value Ref Range Status   06/23/2017 2.6 (A) 0.86 - 1.14 Final       ASSESSMENT / PLAN  INR assessment THER    Recheck INR In: 3 WEEKS    INR Location Clinic      Anticoagulation Summary as of 6/23/2017     INR goal 2.0-3.0   Today's INR 2.6   Maintenance plan 5 mg (5 mg x 1) every day   Full instructions 5 mg every day   Weekly total 35 mg   No change documented Shama Chandra RN   Plan last modified Shama Chandra RN (5/19/2017)   Next INR check 7/14/2017   Priority INR   Target end date     Indications   Paroxysmal atrial fibrillation (H) [I48.0]  Long-term (current) use of anticoagulants [Z79.01] [Z79.01]         Anticoagulation Episode Summary     INR check location     Preferred lab     Send INR reminders to [facility] CL[facility]    Comments       Anticoagulation Care Providers     Provider Role Specialty Phone number    Adela Morgan MD Responsible Internal Medicine [phone]            See the Encounter Report to view Anticoagulation Flowsheet and Dosing Calendar (Go to Encounters tab in chart review, and find the Anticoagulation Therapy Visit)    Dosage adjustment made based on physician directed care plan. Reviewed both bleeding and clotting signs and symptoms with patient this visit. Pt. has no further questions or concerns.  Will call with any changes to diet, medications, or missed doses at INR line [phone].      Shama Chandra, JUAN

## 2017-06-23 NOTE — MR AVS SNAPSHOT
Shannan TERAN Rasta   6/23/2017 9:15 AM   Anticoagulation Therapy Visit    Description:  85 year old female   Provider:  DARA ANTI COAG   Department:  [facility] Nurse           INR as of 6/23/2017     Today's INR 2.6      Anticoagulation Summary as of 6/23/2017     INR goal 2.0-3.0   Today's INR 2.6   Full instructions 5 mg every day   Next INR check 7/14/2017    Indications   Paroxysmal atrial fibrillation (H) [I48.0]  Long-term (current) use of anticoagulants [Z79.01] [Z79.01]         Your next Anticoagulation Clinic appointment(s)     Jul 14, 2017  9:45 AM CDT   Anticoagulation Visit with DARA ANTI COAG   [facility] ([facility]    [address] [address] [phone]              Contact Numbers     [facility]  Please call [phone] to cancel and/or reschedule your appointment   Please call [phone] with any problems or questions regarding your therapy.        June 2017 Details    Sun Mon Tue Wed u Fri Sat         1               2               3                 4               5               6               7               8               9               10                 11               12               13               14               15               16               17                 18               19               20               21               22               23      5 mg   See details      24      5 mg           25      5 mg         26      5 mg         27      5 mg         28      5 mg         29      5 mg         30      5 mg           Date Details   06/23 This INR check               How to take your warfarin dose     To take:  5 mg Take 1 of the 5 mg tablets.           July 2017 Details    Sun Mon Tue Wed u Fri Sat           1      5 mg           2      5 mg         3      5 mg         4      5 mg         5      5 mg         6      5 mg         7      5 mg         8      5 mg           9      5 mg         10      5 mg         11       5 mg         12      5 mg         13      5 mg         14            15                 16               17               18               19               20               21               22                 23               24               25               26               27               28               29                 30               31                     Date Details   No additional details    Date of next INR:  7/14/2017         How to take your warfarin dose     To take:  5 mg Take 1 of the 5 mg tablets.

## 2017-07-17 ENCOUNTER — TELEPHONE (OUTPATIENT)
Dept: INTERNAL MEDICINE | Facility: CLINIC | Age: 82
End: 2017-07-17

## 2017-07-17 ENCOUNTER — ANTICOAGULATION THERAPY VISIT (OUTPATIENT)
Dept: NURSING | Facility: CLINIC | Age: 82
End: 2017-07-17
Payer: COMMERCIAL

## 2017-07-17 DIAGNOSIS — Z13.820 SCREENING FOR OSTEOPOROSIS: ICD-10-CM

## 2017-07-17 DIAGNOSIS — M85.80 OSTEOPENIA: Primary | ICD-10-CM

## 2017-07-17 DIAGNOSIS — Z79.01 LONG-TERM (CURRENT) USE OF ANTICOAGULANTS: ICD-10-CM

## 2017-07-17 DIAGNOSIS — I48.0 PAROXYSMAL ATRIAL FIBRILLATION (H): ICD-10-CM

## 2017-07-17 LAB — INR POINT OF CARE: 3.4 (ref 0.86–1.14)

## 2017-07-17 PROCEDURE — 36416 COLLJ CAPILLARY BLOOD SPEC: CPT

## 2017-07-17 PROCEDURE — 85610 PROTHROMBIN TIME: CPT | Mod: QW

## 2017-07-17 PROCEDURE — 99207 ZZC NO CHARGE NURSE ONLY: CPT

## 2017-07-17 NOTE — TELEPHONE ENCOUNTER
Reason for Call: Request for an order or referral:    Order or referral being requested: bone density    Date needed: 8-21-17 to 9-15-17    Has the patient been seen by the PCP for this problem? Not Applicable    Additional comments: See BeLocal message-    Appointment Request From: Shannan Magdaleno           With Provider: Adela Morgan MD [-Primary Care Physician-]          Preferred Date Range: From 8/21/2017 To 9/15/2017          Preferred Times: Any          Reason for visit: Request an Appointment          Comments:     need an osteoporosis screening.            Phone number Patient can be reached at:  Cell number on file:    Telephone Information:   Mobile [phone]       Best Time:  any    Can we leave a detailed message on this number?  YES    Call taken on 7/17/2017 at 3:14 PM by Nicole Domínguez

## 2017-07-17 NOTE — MR AVS SNAPSHOT
Shannan TERAN Rasta   7/17/2017 2:00 PM   Anticoagulation Therapy Visit    Description:  85 year old female   Provider:  DARA ANTI COAG   Department:  [facility]           INR as of 7/17/2017     Today's INR 3.4!      Anticoagulation Summary as of 7/17/2017     INR goal 2.0-3.0   Today's INR 3.4!   Full instructions 7/18: Hold; Otherwise 5 mg every day   Next INR check 8/14/2017    Indications   Paroxysmal atrial fibrillation (H) [I48.0]  Long-term (current) use of anticoagulants [Z79.01] [Z79.01]         Description     Hold dose for tomorrow, return to 5mg daily      Your next Anticoagulation Clinic appointment(s)     Jul 17, 2017  2:00 PM CDT   Anticoagulation Visit with DARA ANTI COAG   [facility]    [address] [place] [address] [phone]            Aug 14, 2017  9:30 AM CDT   Anticoagulation Visit with DARA ANTI COAG   [facility] ([address] [address] [phone]              Contact Numbers     [facility]  Please call [phone] to cancel and/or reschedule your appointment   Please call [phone] with any problems or questions regarding your therapy.        July 2017 Details    Sun Mon Tue Wed Thu Fri Sat           1                 2               3               4               5               6               7               8                 9               10               11               12               13               14               15                 16               17      5 mg   See details      18      Hold         19      5 mg         20      5 mg         21      5 mg         22      5 mg           23      5 mg         24      5 mg         25      5 mg         26      5 mg         27      5 mg         28      5 mg         29      5 mg           30      5 mg         31      5 mg               Date Details   07/17 This INR check               How to take your  warfarin dose     To take:  5 mg Take 1 of the 5 mg tablets.    Hold Do not take your warfarin dose. See the Details table to the right for additional instructions.                August 2017 Details    Sun Mon Tue Wed Thu Fri Sat       1      5 mg         2      5 mg         3      5 mg         4      5 mg         5      5 mg           6      5 mg         7      5 mg         8      5 mg         9      5 mg         10      5 mg         11      5 mg         12      5 mg           13      5 mg         14            15               16               17               18               19                 20               21               22               23               24               25               26                 27               28               29               30               31                  Date Details   No additional details    Date of next INR:  8/14/2017         How to take your warfarin dose     To take:  5 mg Take 1 of the 5 mg tablets.

## 2017-07-17 NOTE — PROGRESS NOTES
ANTICOAGULATION FOLLOW-UP CLINIC VISIT    Patient Name:  Shannan Magdaleno  Date:  7/17/2017  Contact Type:  Face to Face    SUBJECTIVE:     Patient Findings     Positives No Problem Findings           OBJECTIVE    INR Protime   Date Value Ref Range Status   07/17/2017 3.4 (A) 0.86 - 1.14 Final       ASSESSMENT / PLAN  INR assessment SUPRA    Recheck INR In: 4 WEEKS    INR Location Clinic      Anticoagulation Summary as of 7/17/2017     INR goal 2.0-3.0   Today's INR 3.4!   Maintenance plan 5 mg (5 mg x 1) every day   Full instructions 7/18: Hold; Otherwise 5 mg every day   Weekly total 35 mg   Plan last modified Shama Chandra RN (5/19/2017)   Next INR check 8/14/2017   Priority INR   Target end date     Indications   Paroxysmal atrial fibrillation (H) [I48.0]  Long-term (current) use of anticoagulants [Z79.01] [Z79.01]         Anticoagulation Episode Summary     INR check location     Preferred lab     Send INR reminders to [facility]    Comments       Anticoagulation Care Providers     Provider Role Specialty Phone number    Adela Morgan MD [facility] Internal Medicine [phone]            See the Encounter Report to view Anticoagulation Flowsheet and Dosing Calendar (Go to Encounters tab in chart review, and find the Anticoagulation Therapy Visit)    Dosage adjustment made based on physician directed care plan. Reviewed both bleeding and clotting signs and symptoms with patient this visit. Pt. has no further questions or concerns.  Will call with any changes to diet, medications, or missed doses at INR line [phone].      Shama Chandra, RN

## 2017-07-18 NOTE — TELEPHONE ENCOUNTER
Called and informed of orders and gave number to call and schedule this test.     Mily DENT CMA ([facility])

## 2017-07-31 ENCOUNTER — RADIANT APPOINTMENT (OUTPATIENT)
Dept: BONE DENSITY | Facility: CLINIC | Age: 82
End: 2017-07-31
Attending: INTERNAL MEDICINE
Payer: COMMERCIAL

## 2017-07-31 DIAGNOSIS — Z13.820 SCREENING FOR OSTEOPOROSIS: ICD-10-CM

## 2017-07-31 DIAGNOSIS — M85.80 OSTEOPENIA: ICD-10-CM

## 2017-07-31 PROCEDURE — 77080 DXA BONE DENSITY AXIAL: CPT | Performed by: INTERNAL MEDICINE

## 2017-08-14 ENCOUNTER — ANTICOAGULATION THERAPY VISIT (OUTPATIENT)
Dept: NURSING | Facility: CLINIC | Age: 82
End: 2017-08-14
Payer: COMMERCIAL

## 2017-08-14 DIAGNOSIS — Z79.01 LONG-TERM (CURRENT) USE OF ANTICOAGULANTS: ICD-10-CM

## 2017-08-14 DIAGNOSIS — I48.0 PAROXYSMAL ATRIAL FIBRILLATION (H): ICD-10-CM

## 2017-08-14 LAB — INR POINT OF CARE: 3.4 (ref 0.86–1.14)

## 2017-08-14 PROCEDURE — 85610 PROTHROMBIN TIME: CPT | Mod: QW

## 2017-08-14 PROCEDURE — 36416 COLLJ CAPILLARY BLOOD SPEC: CPT

## 2017-08-14 PROCEDURE — 99207 ZZC NO CHARGE NURSE ONLY: CPT

## 2017-08-14 NOTE — PROGRESS NOTES
ANTICOAGULATION FOLLOW-UP CLINIC VISIT    Patient Name:  Shannan Magdaleno  Date:  8/14/2017  Contact Type:  Face to Face    SUBJECTIVE:     Patient Findings     Positives No Problem Findings, Unexplained INR or factor level change    Comments Patient has had slightly elevated INR's for awhile. Trying a maintainance dose decrease to 2.5 mg on Tuesday and Thursday to see if she will stay in range.           OBJECTIVE    INR Protime   Date Value Ref Range Status   08/14/2017 3.4 (A) 0.86 - 1.14 Final       ASSESSMENT / PLAN  INR assessment SUPRA    Recheck INR In: 2 WEEKS    INR Location Clinic      Anticoagulation Summary as of 8/14/2017     INR goal 2.0-3.0   Today's INR 3.4!   Maintenance plan 5 mg (5 mg x 1) every day   Full instructions 8/15: 2.5 mg; 8/17: 2.5 mg; 8/22: 2.5 mg; 8/24: 2.5 mg; Otherwise 5 mg every day   Weekly total 35 mg   Plan last modified Shama Chandra RN (5/19/2017)   Next INR check 8/28/2017   Priority INR   Target end date     Indications   Paroxysmal atrial fibrillation (H) [I48.0]  Long-term (current) use of anticoagulants [Z79.01] [Z79.01]         Anticoagulation Episode Summary     INR check location     Preferred lab     Send INR reminders to [facility]    Comments       Anticoagulation Care Providers     Provider Role Specialty Phone number    Adela Morgan MD [facility] Internal Medicine [phone]            See the Encounter Report to view Anticoagulation Flowsheet and Dosing Calendar (Go to Encounters tab in chart review, and find the Anticoagulation Therapy Visit)    Dosage adjustment made based on physician directed care plan. Reviewed both bleeding and clotting signs and symptoms with patient this visit. Pt. has no further questions or concerns.  Will call with any changes to diet, medications, or missed doses at INR line [phone].      Shama Chandra, JUAN

## 2017-08-14 NOTE — MR AVS SNAPSHOT
Shannan Churchillcheco   8/14/2017 9:30 AM   Anticoagulation Therapy Visit    Description:  85 year old female   Provider:  DARA ANTI COAG   Department:  [facility]           INR as of 8/14/2017     Today's INR 3.4!      Anticoagulation Summary as of 8/14/2017     INR goal 2.0-3.0   Today's INR 3.4!   Full instructions 8/15: 2.5 mg; 8/17: 2.5 mg; 8/22: 2.5 mg; 8/24: 2.5 mg; Otherwise 5 mg every day   Next INR check 8/28/2017    Indications   Paroxysmal atrial fibrillation (H) [I48.0]  Long-term (current) use of anticoagulants [Z79.01] [Z79.01]         Your next Anticoagulation Clinic appointment(s)     Aug 14, 2017  9:30 AM CDT   An[facility]agulation [facility] with DARA ANTI COAG   [facility]    [address] [place] [address] [phone]            Aug 28, 2017 10:30 AM CDT   Anticoagulation Visit with DARA ANTI COAG   [facility] ([address] [address] [phone]              Contact Numbers     [facility]  Please call [phone] to cancel and/or reschedule your appointment   Please call [phone] with any problems or questions regarding your therapy.        August 2017 Details    Sun Mon Tue Wed Thu Fri Sat       1               2               3               4               5                 6               7               8               9               10               11               12                 13               14      5 mg   See details      15      2.5 mg         16      5 mg         17      2.5 mg         18      5 mg         19      5 mg           20      5 mg         21      5 mg         22      2.5 mg         23      5 mg         24      2.5 mg         25      5 mg         26      5 mg           27      5 mg         28            29               30               31                  Date Details   08/14 This INR check       Date of next INR:  8/28/2017         How to take your  warfarin dose     To take:  2.5 mg Take 0.5 of a 5 mg tablet.    To take:  5 mg Take 1 of the 5 mg tablets.

## 2017-08-28 ENCOUNTER — ANTICOAGULATION THERAPY VISIT (OUTPATIENT)
Dept: NURSING | Facility: CLINIC | Age: 82
End: 2017-08-28
Payer: COMMERCIAL

## 2017-08-28 DIAGNOSIS — Z79.01 LONG-TERM (CURRENT) USE OF ANTICOAGULANTS: ICD-10-CM

## 2017-08-28 DIAGNOSIS — I48.0 PAROXYSMAL ATRIAL FIBRILLATION (H): ICD-10-CM

## 2017-08-28 LAB — INR POINT OF CARE: 3.4 (ref 0.86–1.14)

## 2017-08-28 PROCEDURE — 36416 COLLJ CAPILLARY BLOOD SPEC: CPT

## 2017-08-28 PROCEDURE — 85610 PROTHROMBIN TIME: CPT | Mod: QW

## 2017-08-28 PROCEDURE — 99207 ZZC NO CHARGE NURSE ONLY: CPT

## 2017-08-28 NOTE — MR AVS SNAPSHOT
Shannan Colemankarl   8/28/2017 10:30 AM   Anticoagulation Therapy Visit    Description:  85 year old female   Provider:  DARA ANTI COAG   Department:  [facility]           INR as of 8/28/2017     Today's INR 3.4!      Anticoagulation Summary as of 8/28/2017     INR goal 2.0-3.0   Today's INR 3.4!   Full instructions 8/29: 2.5 mg; 8/31: 2.5 mg; 9/2: 2.5 mg; 9/5: 2.5 mg; 9/7: 2.5 mg; 9/9: 2.5 mg; Otherwise 5 mg every day   Next INR check 9/11/2017    Indications   Paroxysmal atrial fibrillation (H) [I48.0]  Long-term (current) use of anticoagulants [Z79.01] [Z79.01]         Your next Anticoagulation Clinic appointment(s)     Sep 11, 2017  9:15 AM CDT   Anticoagulation Visit with DARA ANTI COAG   [facility] ([facility]    [address] [address] [phone]              Contact Numbers     [facility]  Please call [phone] to cancel and/or reschedule your appointment   Please call [phone] with any problems or questions regarding your therapy.        August 2017 Details    Sun Mon Tue Wed Thu Fri Sat       1               2               3               4               5                 6               7               8               9               10               11               12                 13               14               15               16               17               18               19                 20               21               22               23               24               25               26                 27               28      5 mg   See details      29      2.5 mg         30      5 mg         31      2.5 mg            Date Details   08/28 This INR check               How to take your warfarin dose     To take:  2.5 mg Take 0.5 of a 5 mg tablet.    To take:  5 mg Take 1 of the 5 mg tablets.           September 2017 Details    Sun Mon Tue Wed Thu Fri Sat          1      5 mg         2      2.5 mg           3      5 mg          4      5 mg         5      2.5 mg         6      5 mg         7      2.5 mg         8      5 mg         9      2.5 mg           10      5 mg         11            12               13               14               15               16                 17               18               19               20               21               22               23                 24               25               26               27               28               29               30                Date Details   No additional details    Date of next INR:  9/11/2017         How to take your warfarin dose     To take:  2.5 mg Take 0.5 of a 5 mg tablet.    To take:  5 mg Take 1 of the 5 mg tablets.

## 2017-08-28 NOTE — PROGRESS NOTES
ANTICOAGULATION FOLLOW-UP CLINIC VISIT    Patient Name:  Shannan Magdaleno  Date:  8/28/2017  Contact Type:  Face to Face    SUBJECTIVE:     Patient Findings     Positives No Problem Findings, Unexplained INR or factor level change           OBJECTIVE    INR Protime   Date Value Ref Range Status   08/28/2017 3.4 (A) 0.86 - 1.14 Final       ASSESSMENT / PLAN  No question data found.  Anticoagulation Summary as of 8/28/2017     INR goal 2.0-3.0   Today's INR 3.4!   Maintenance plan 5 mg (5 mg x 1) every day   Full instructions 8/29: 2.5 mg; 8/31: 2.5 mg; 9/2: 2.5 mg; 9/5: 2.5 mg; 9/7: 2.5 mg; 9/9: 2.5 mg; Otherwise 5 mg every day   Weekly total 35 mg   Plan last modified Shama Chandra RN (5/19/2017)   Next INR check 9/11/2017   Priority INR   Target end date     Indications   Paroxysmal atrial fibrillation (H) [I48.0]  Long-term (current) use of anticoagulants [Z79.01] [Z79.01]         Anticoagulation Episode Summary     INR check location     Preferred lab     Send INR reminders to [facility]    Comments       Anticoagulation Care Providers     Provider Role Specialty Phone number    Adela Morgan MD [facility] Internal Medicine [phone]            See the Encounter Report to view Anticoagulation Flowsheet and Dosing Calendar (Go to Encounters tab in chart review, and find the Anticoagulation Therapy Visit)    Dosage adjustment made based on physician directed care plan.    Moon Lobato RN

## 2017-08-31 ENCOUNTER — TELEPHONE (OUTPATIENT)
Dept: INTERNAL MEDICINE | Facility: CLINIC | Age: 82
End: 2017-08-31

## 2017-08-31 NOTE — TELEPHONE ENCOUNTER
Advised patient she is to be taking Warfarin 2.5 mg Tue/Thu/Sat and 5 mg ROW. Next appointment is 9/11 at 9:15am   Patient notified of providers message as written.   Patient verbalized understanding and has no further questions or concerns.    Moon Lobato RN - BC

## 2017-08-31 NOTE — TELEPHONE ENCOUNTER
Patient left voicemail  Message stating she lost her warfarin dosing instructions. Requesting a call back.    Moon Lobato RN - BC

## 2017-09-01 DIAGNOSIS — E78.5 HYPERLIPIDEMIA LDL GOAL <70: ICD-10-CM

## 2017-09-05 ENCOUNTER — MYC MEDICAL ADVICE (OUTPATIENT)
Dept: INTERNAL MEDICINE | Facility: CLINIC | Age: 82
End: 2017-09-05

## 2017-09-05 DIAGNOSIS — I48.0 PAROXYSMAL ATRIAL FIBRILLATION (H): ICD-10-CM

## 2017-09-05 RX ORDER — SIMVASTATIN 40 MG
TABLET ORAL
Qty: 30 TABLET | Refills: 0 | Status: SHIPPED | OUTPATIENT
Start: 2017-09-05 | End: 2017-09-22

## 2017-09-05 NOTE — TELEPHONE ENCOUNTER
simvastatin (ZOCOR) 40 MG tablet   Last Written Prescription Date: 08/03/2016  Last Fill Quantity: 90, # refills: 3  Last Office Visit with G, [facility] or [facility] prescribing provider: 05/08/2017       Lab Results   Component Value Date    CHOL 125 09/09/2016     Lab Results   Component Value Date    HDL 60 09/09/2016     Lab Results   Component Value Date    LDL 53 09/09/2016     Lab Results   Component Value Date    TRIG 61 09/09/2016     Lab Results   Component Value Date    CHOLHDLRATIO 2.1 05/21/2014     Liset Alanis MA

## 2017-09-05 NOTE — TELEPHONE ENCOUNTER
Prescription approved per [facility] Refill Protocol.  Patient due for labs for further refills.  Moon Lobato RN - BC

## 2017-09-06 NOTE — TELEPHONE ENCOUNTER
Daughter-Minerva would like to speak with a nurse regarding patient's medication dosing. Please call [phone]

## 2017-09-06 NOTE — TELEPHONE ENCOUNTER
The pharmacy must not have put on the correct directions.  See script sent for metoprolol.  Also help her with warfarin dosing and setting up appointment

## 2017-09-07 RX ORDER — METOPROLOL TARTRATE 25 MG/1
37.5 TABLET, FILM COATED ORAL 2 TIMES DAILY
Qty: 270 TABLET | Refills: 1 | Status: SHIPPED | OUTPATIENT
Start: 2017-09-07 | End: 2018-03-29

## 2017-09-07 NOTE — TELEPHONE ENCOUNTER
"INR has already been addressed by INR nurse, see other 9/5/17 mychart encounter    Past prescriptions for Metoprolol were written for 25mg, take 1.5 tabs (37.5mg) BID, this is maybe what the pharmacy is refilling  Daughter is stating that this is the dose that patient has been taking for as long as she can remember  The current Metoprolol prescription on [facility] med list for 2 tabs (50mg) BID was never E-prescribed. It was set to \"no print out\" and did not go to the pharmacy  Daughter is saying that patient reported that she was too tired on the 2 tabs.    Please advise if she should still increase dose to 2 tabs BID, or stay on 1.5 tabs BID    [employer] message sent explaining that INR/coumadin has been addressed by INR nurse in a separate message and advised her to call to schedule patient for an appointment with either DR Morgan or one of her patners    Tania Hall RN        "

## 2017-09-08 ENCOUNTER — ANTICOAGULATION THERAPY VISIT (OUTPATIENT)
Dept: NURSING | Facility: CLINIC | Age: 82
End: 2017-09-08
Payer: COMMERCIAL

## 2017-09-08 DIAGNOSIS — I48.0 PAROXYSMAL ATRIAL FIBRILLATION (H): ICD-10-CM

## 2017-09-08 DIAGNOSIS — Z79.01 LONG-TERM (CURRENT) USE OF ANTICOAGULANTS: ICD-10-CM

## 2017-09-08 LAB — INR POINT OF CARE: 3.6 (ref 0.86–1.14)

## 2017-09-08 PROCEDURE — 99207 ZZC NO CHARGE NURSE ONLY: CPT

## 2017-09-08 PROCEDURE — 36416 COLLJ CAPILLARY BLOOD SPEC: CPT

## 2017-09-08 PROCEDURE — 85610 PROTHROMBIN TIME: CPT | Mod: QW

## 2017-09-08 NOTE — PROGRESS NOTES
ANTICOAGULATION FOLLOW-UP CLINIC VISIT    Patient Name:  Shannan Magdaleno  Date:  9/8/2017  Contact Type:  Face to Face    SUBJECTIVE:     Patient Findings     Positives Extra doses, No Problem Findings    Comments Patient couldn't remember what dose she was suppose to take so she took a whole tablet (5mg) daily instead of 2.5 mg M/W/F and 5 mg ROW.           OBJECTIVE    INR Protime   Date Value Ref Range Status   09/08/2017 3.6 (A) 0.86 - 1.14 Final       ASSESSMENT / PLAN  No question data found.  Anticoagulation Summary as of 9/8/2017     INR goal 2.0-3.0   Today's INR 3.6!   Maintenance plan 5 mg (5 mg x 1) every day   Full instructions 9/8: 2.5 mg; 9/11: 2.5 mg; 9/13: 2.5 mg; 9/15: 2.5 mg; Otherwise 5 mg every day   Weekly total 35 mg   Plan last modified Shama Chandra RN (5/19/2017)   Next INR check 9/15/2017   Priority INR   Target end date     Indications   Paroxysmal atrial fibrillation (H) [I48.0]  Long-term (current) use of anticoagulants [Z79.01] [Z79.01]         Anticoagulation Episode Summary     INR check location     Preferred lab     Send INR reminders to [facility]    Comments       Anticoagulation Care Providers     Provider Role Specialty Phone number    Adela Morgan MD [facility] Internal Medicine [phone]            See the Encounter Report to view Anticoagulation Flowsheet and Dosing Calendar (Go to Encounters tab in chart review, and find the Anticoagulation Therapy Visit)    Dosage adjustment made based on physician directed care plan.    Moon Lobato RN

## 2017-09-08 NOTE — MR AVS SNAPSHOT
Shannan Churchillcheco   9/8/2017 2:00 PM   Anticoagulation Therapy Visit    Description:  85 year old female   Provider:  DARA ANTI COAG   Department:  [facility]           INR as of 9/8/2017     Today's INR 3.6!      Anticoagulation Summary as of 9/8/2017     INR goal 2.0-3.0   Today's INR 3.6!   Full instructions 9/8: 2.5 mg; 9/11: 2.5 mg; 9/13: 2.5 mg; 9/15: 2.5 mg; Otherwise 5 mg every day   Next INR check 9/15/2017    Indications   Paroxysmal atrial fibrillation (H) [I48.0]  Long-term (current) use of anticoagulants [Z79.01] [Z79.01]         Your next Anticoagulation Clinic appointment(s)     Sep 15, 2017  2:00 PM CDT   Anticoagulation Visit with DARA ANTI COAPARUL   [facility] ([address] [address] [phone]              Contact Numbers     [facility]  Please call [phone] to cancel and/or reschedule your appointment   Please call [phone] with any problems or questions regarding your therapy.        September 2017 Details    Sun Mon Tue Wed Thu Fri Sat          1               2                 3               4               5               6               7               8      2.5 mg   See details      9      5 mg           10      5 mg         11      2.5 mg         12      5 mg         13      2.5 mg         14      5 mg         15            16                 17               18               19               20               21               22               23                 24               25               26               27               28               29               30                Date Details   09/08 This INR check       Date of next INR:  9/15/2017         How to take your warfarin dose     To take:  2.5 mg Take 0.5 of a 5 mg tablet.    To take:  5 mg Take 1 of the 5 mg tablets.

## 2017-09-15 ENCOUNTER — ANTICOAGULATION THERAPY VISIT (OUTPATIENT)
Dept: NURSING | Facility: CLINIC | Age: 82
End: 2017-09-15
Payer: COMMERCIAL

## 2017-09-15 DIAGNOSIS — Z79.01 LONG-TERM (CURRENT) USE OF ANTICOAGULANTS: ICD-10-CM

## 2017-09-15 DIAGNOSIS — I48.0 PAROXYSMAL ATRIAL FIBRILLATION (H): ICD-10-CM

## 2017-09-15 LAB — INR POINT OF CARE: 1.9 (ref 0.86–1.14)

## 2017-09-15 PROCEDURE — 85610 PROTHROMBIN TIME: CPT | Mod: QW

## 2017-09-15 PROCEDURE — 36416 COLLJ CAPILLARY BLOOD SPEC: CPT

## 2017-09-15 PROCEDURE — 99207 ZZC NO CHARGE NURSE ONLY: CPT

## 2017-09-15 NOTE — MR AVS SNAPSHOT
Shannan TERAN Rasta   9/15/2017 2:00 PM   Anticoagulation Therapy Visit    Description:  85 year old female   Provider:  DARA ANTI COAG   Department:  [facility]           INR as of 9/15/2017     Today's INR 1.9!      Anticoagulation Summary as of 9/15/2017     INR goal 2.0-3.0   Today's INR 1.9!   Full instructions 9/15: 2.5 mg; 9/18: 2.5 mg; 9/22: 2.5 mg; Otherwise 5 mg every day   Next INR check 9/22/2017    Indications   Paroxysmal atrial fibrillation (H) [I48.0]  Long-term (current) use of anticoagulants [Z79.01] [Z79.01]         Your next Anticoagulation Clinic appointment(s)     Sep 22, 2017 12:45 PM CDT   Anticoagulation Visit with DARA ANTI COAPARUL   [facility] ([address] [address] [phone]              Contact Numbers     [facility]  Please call [phone] to cancel and/or reschedule your appointment   Please call [phone] with any problems or questions regarding your therapy.        September 2017 Details    Sun Mon Tue Wed Thu Fri Sat          1               2                 3               4               5               6               7               8               9                 10               11               12               13               14               15      2.5 mg   See details      16      5 mg           17      5 mg         18      2.5 mg         19      5 mg         20      5 mg         21      5 mg         22            23                 24               25               26               27               28               29               30                Date Details   09/15 This INR check       Date of next INR:  9/22/2017         How to take your warfarin dose     To take:  2.5 mg Take 0.5 of a 5 mg tablet.    To take:  5 mg Take 1 of the 5 mg tablets.

## 2017-09-15 NOTE — PROGRESS NOTES
ANTICOAGULATION FOLLOW-UP CLINIC VISIT    Patient Name:  Shannan Magdaleno  Date:  9/15/2017  Contact Type:  Face to Face    SUBJECTIVE:     Patient Findings     Positives No Problem Findings           OBJECTIVE    INR Protime   Date Value Ref Range Status   09/15/2017 1.9 (A) 0.86 - 1.14 Final       ASSESSMENT / PLAN  No question data found.  Anticoagulation Summary as of 9/15/2017     INR goal 2.0-3.0   Today's INR 1.9!   Maintenance plan 5 mg (5 mg x 1) every day   Full instructions 9/15: 2.5 mg; 9/18: 2.5 mg; 9/22: 2.5 mg; Otherwise 5 mg every day   Weekly total 35 mg   Plan last modified Shama Chandra RN (5/19/2017)   Next INR check 9/22/2017   Priority INR   Target end date     Indications   Paroxysmal atrial fibrillation (H) [I48.0]  Long-term (current) use of anticoagulants [Z79.01] [Z79.01]         Anticoagulation Episode Summary     INR check location     Preferred lab     Send INR reminders to [facility]    Comments       Anticoagulation Care Providers     Provider Role Specialty Phone number    Adela Morgan MD [facility] Internal Medicine [phone]            See the Encounter Report to view Anticoagulation Flowsheet and Dosing Calendar (Go to Encounters tab in chart review, and find the Anticoagulation Therapy Visit)    Dosage adjustment made based on physician directed care plan.    Moon Lobato RN

## 2017-09-22 ENCOUNTER — ANTICOAGULATION THERAPY VISIT (OUTPATIENT)
Dept: NURSING | Facility: CLINIC | Age: 82
End: 2017-09-22
Payer: COMMERCIAL

## 2017-09-22 ENCOUNTER — OFFICE VISIT (OUTPATIENT)
Dept: INTERNAL MEDICINE | Facility: CLINIC | Age: 82
End: 2017-09-22
Payer: COMMERCIAL

## 2017-09-22 VITALS
DIASTOLIC BLOOD PRESSURE: 76 MMHG | TEMPERATURE: 97.2 F | SYSTOLIC BLOOD PRESSURE: 104 MMHG | BODY MASS INDEX: 26.74 KG/M2 | OXYGEN SATURATION: 98 % | WEIGHT: 146.2 LBS | HEART RATE: 66 BPM

## 2017-09-22 DIAGNOSIS — E78.5 HYPERLIPIDEMIA LDL GOAL <70: ICD-10-CM

## 2017-09-22 DIAGNOSIS — I48.0 PAROXYSMAL ATRIAL FIBRILLATION (H): ICD-10-CM

## 2017-09-22 DIAGNOSIS — R10.9 ABDOMINAL PRESSURE: Primary | ICD-10-CM

## 2017-09-22 DIAGNOSIS — F41.9 ANXIETY: ICD-10-CM

## 2017-09-22 DIAGNOSIS — M25.472 ANKLE SWELLING, LEFT: ICD-10-CM

## 2017-09-22 DIAGNOSIS — Z79.01 LONG-TERM (CURRENT) USE OF ANTICOAGULANTS: ICD-10-CM

## 2017-09-22 DIAGNOSIS — L98.9 SKIN LESION: ICD-10-CM

## 2017-09-22 DIAGNOSIS — C83.00 LYMPHOMA, SMALL LYMPHOCYTIC (H): ICD-10-CM

## 2017-09-22 DIAGNOSIS — Z23 NEED FOR PROPHYLACTIC VACCINATION AND INOCULATION AGAINST INFLUENZA: ICD-10-CM

## 2017-09-22 LAB
ALBUMIN SERPL-MCNC: 3.5 G/DL (ref 3.4–5)
ALBUMIN UR-MCNC: NEGATIVE MG/DL
ALP SERPL-CCNC: 60 U/L (ref 40–150)
ALT SERPL W P-5'-P-CCNC: 33 U/L (ref 0–50)
ANION GAP SERPL CALCULATED.3IONS-SCNC: 5 MMOL/L (ref 3–14)
APPEARANCE UR: CLEAR
AST SERPL W P-5'-P-CCNC: 33 U/L (ref 0–45)
BACTERIA #/AREA URNS HPF: ABNORMAL /HPF
BASOPHILS # BLD AUTO: 0 10E9/L (ref 0–0.2)
BASOPHILS NFR BLD AUTO: 0.5 %
BILIRUB SERPL-MCNC: 0.5 MG/DL (ref 0.2–1.3)
BILIRUB UR QL STRIP: NEGATIVE
BUN SERPL-MCNC: 17 MG/DL (ref 7–30)
CALCIUM SERPL-MCNC: 9 MG/DL (ref 8.5–10.1)
CHLORIDE SERPL-SCNC: 106 MMOL/L (ref 94–109)
CO2 SERPL-SCNC: 27 MMOL/L (ref 20–32)
COLOR UR AUTO: YELLOW
CREAT SERPL-MCNC: 0.73 MG/DL (ref 0.52–1.04)
DIFFERENTIAL METHOD BLD: NORMAL
EOSINOPHIL # BLD AUTO: 0.5 10E9/L (ref 0–0.7)
EOSINOPHIL NFR BLD AUTO: 5.1 %
ERYTHROCYTE [DISTWIDTH] IN BLOOD BY AUTOMATED COUNT: 14 % (ref 10–15)
GFR SERPL CREATININE-BSD FRML MDRD: 76 ML/MIN/1.7M2
GLUCOSE SERPL-MCNC: 86 MG/DL (ref 70–99)
GLUCOSE UR STRIP-MCNC: NEGATIVE MG/DL
HCT VFR BLD AUTO: 37.2 % (ref 35–47)
HGB BLD-MCNC: 12.4 G/DL (ref 11.7–15.7)
HGB UR QL STRIP: ABNORMAL
INR POINT OF CARE: 2 (ref 0.86–1.14)
KETONES UR STRIP-MCNC: NEGATIVE MG/DL
LEUKOCYTE ESTERASE UR QL STRIP: NEGATIVE
LYMPHOCYTES # BLD AUTO: 4.3 10E9/L (ref 0.8–5.3)
LYMPHOCYTES NFR BLD AUTO: 49 %
MCH RBC QN AUTO: 31.5 PG (ref 26.5–33)
MCHC RBC AUTO-ENTMCNC: 33.3 G/DL (ref 31.5–36.5)
MCV RBC AUTO: 94 FL (ref 78–100)
MONOCYTES # BLD AUTO: 0.9 10E9/L (ref 0–1.3)
MONOCYTES NFR BLD AUTO: 10.6 %
NEUTROPHILS # BLD AUTO: 3.1 10E9/L (ref 1.6–8.3)
NEUTROPHILS NFR BLD AUTO: 34.8 %
NITRATE UR QL: NEGATIVE
NON-SQ EPI CELLS #/AREA URNS LPF: ABNORMAL /LPF
PH UR STRIP: 6 PH (ref 5–7)
PLATELET # BLD AUTO: 239 10E9/L (ref 150–450)
POTASSIUM SERPL-SCNC: 4.1 MMOL/L (ref 3.4–5.3)
PROT SERPL-MCNC: 7.2 G/DL (ref 6.8–8.8)
RBC # BLD AUTO: 3.94 10E12/L (ref 3.8–5.2)
RBC #/AREA URNS AUTO: ABNORMAL /HPF
SODIUM SERPL-SCNC: 138 MMOL/L (ref 133–144)
SOURCE: ABNORMAL
SP GR UR STRIP: 1.02 (ref 1–1.03)
UROBILINOGEN UR STRIP-ACNC: 0.2 EU/DL (ref 0.2–1)
WBC # BLD AUTO: 8.8 10E9/L (ref 4–11)
WBC #/AREA URNS AUTO: ABNORMAL /HPF

## 2017-09-22 PROCEDURE — 99207 C PAF COMPLETED  NO CHARGE: CPT | Performed by: INTERNAL MEDICINE

## 2017-09-22 PROCEDURE — 81001 URINALYSIS AUTO W/SCOPE: CPT | Performed by: INTERNAL MEDICINE

## 2017-09-22 PROCEDURE — 85610 PROTHROMBIN TIME: CPT | Mod: QW

## 2017-09-22 PROCEDURE — 99207 ZZC NO CHARGE NURSE ONLY: CPT

## 2017-09-22 PROCEDURE — 85025 COMPLETE CBC W/AUTO DIFF WBC: CPT | Performed by: INTERNAL MEDICINE

## 2017-09-22 PROCEDURE — 99214 OFFICE O/P EST MOD 30 MIN: CPT | Mod: 25 | Performed by: INTERNAL MEDICINE

## 2017-09-22 PROCEDURE — 80053 COMPREHEN METABOLIC PANEL: CPT | Performed by: INTERNAL MEDICINE

## 2017-09-22 PROCEDURE — 36416 COLLJ CAPILLARY BLOOD SPEC: CPT

## 2017-09-22 PROCEDURE — G0008 ADMIN INFLUENZA VIRUS VAC: HCPCS | Performed by: INTERNAL MEDICINE

## 2017-09-22 RX ORDER — SIMVASTATIN 40 MG
TABLET ORAL
Qty: 90 TABLET | Refills: 3 | Status: SHIPPED | OUTPATIENT
Start: 2017-09-22 | End: 2018-09-29

## 2017-09-22 RX ORDER — WARFARIN SODIUM 5 MG/1
TABLET ORAL
Qty: 90 TABLET | Refills: 3 | Status: SHIPPED | OUTPATIENT
Start: 2017-09-22 | End: 2018-10-07

## 2017-09-22 RX ORDER — DIAZEPAM 2 MG
TABLET ORAL
Qty: 60 TABLET | Refills: 1 | Status: SHIPPED | OUTPATIENT
Start: 2017-09-22 | End: 2018-01-14

## 2017-09-22 ASSESSMENT — PATIENT HEALTH QUESTIONNAIRE - PHQ9: SUM OF ALL RESPONSES TO PHQ QUESTIONS 1-9: 2

## 2017-09-22 ASSESSMENT — PAIN SCALES - GENERAL: PAINLEVEL: NO PAIN (0)

## 2017-09-22 NOTE — PROGRESS NOTES
Injectable Influenza Immunization Documentation    1.  Is the person to be vaccinated sick today?   No    2. Does the person to be vaccinated have an allergy to a component   of the vaccine?   No    3. Has the person to be vaccinated ever had a serious reaction   to influenza vaccine in the past?   No    4. Has the person to be vaccinated ever had Guillain-Barré syndrome?   No    Form completed by Chiara Mercer [facility]

## 2017-09-22 NOTE — PROGRESS NOTES
INTERNAL MEDICINE  SUBJECTIVE:   Shannan Magdaleno is a 85 year old female who presents to clinic today with her daughter for the following health issues:    Patient presents to clinic today with bladder pain and to discuss HTN medication.      Mood - Daughter thinks Lexapro is working well. She has less anxiety and her  has not been complaining about her anxiety as much.    Bladder - Patient says her bladder hurts when full. This has been happening for a few months. Denies blood in urine. She is trying to drink more water because she was dehydrated when she ended up in hospital. She does not feel she urinates that often. She does not notice the pain during the day but at night when she gets up to urinate. Patient feels her bladder is empty every time she urinates. Denies burning sensation with urination. Her abdomen does not feel more swollen. Her bowel movements are normal. Denies fevers, chills, and night sweats.    Feet - Patient says her left foot bothers her with her varicose veins. She has not been applying mometasone cream to the spot on her left lower leg as prescribed and infrequently used it. Her left ankle feels swollen and is bothersome to apply pressure to when she walks. There was a time patient fell when going up the stairs as her left ankle seemed to give out under her and the railing ended.    Additional Notes  Her daughter is filling her medications every week, to avoid confusion.   Her INR has been off and they are changing her Warfarin dose.  She is taking diazepam half tablet only. She has not needed the full tablet.      Problem list and histories reviewed & adjusted, as indicated.  Additional history: as documented    Labs reviewed in EPIC    Reviewed and updated as needed this visit by clinical staff  Tobacco  Allergies  Meds  Med Hx  Surg Hx  Fam Hx  Soc Hx      Reviewed and updated as needed this visit by Provider         ROS:  C: NEGATIVE for fever, chills, change in  weight  I: NEGATIVE for worrisome rashes, moles or lesions  R: NEGATIVE for significant cough or SOB  CV: NEGATIVE for chest pain, palpitations or peripheral edema  GI: NEGATIVE for nausea, abdominal pain, heartburn, or change in bowel habits  : NEGATIVE for frequency, dysuria, or hematuria  M: NEGATIVE for significant arthralgias or myalgia  N: NEGATIVE for weakness, dizziness or paresthesias  P: NEGATIVE for changes in mood or affect    This document serves as a record of the services and decisions personally performed and made by Adela Morgan MD. It was created on his/her behalf by Meka Barber, trained medical scribe. The creation of this document is based the provider's statements to the medical scribes.    Scribcricket Barber 12:24 PM, September 22, 2017  OBJECTIVE:     /76 (BP Location: Right arm, Cuff Size: Adult Regular)  Pulse 66  Temp 97.2  F (36.2  C) (Oral)  Wt 66.3 kg (146 lb 3.2 oz)  SpO2 98%  Breastfeeding? No  BMI 26.74 kg/m2  Body mass index is 26.74 kg/(m^2).  GENERAL: healthy, alert and no distress  CV: varicosity changes bilaterally  ABDOMEN: soft, nontender, no hepatosplenomegaly, no masses and bowel sounds normal, prominence in the left periumbilical region, large scar at right paramedian  MS: no gross musculoskeletal defects noted, no tenderness over the left lateral ankle, swelling at the left lateral ankle joint, trace edema  SKIN: no suspicious rashes, serpentine dime sized rash on her left lower leg  NEURO: Normal strength and tone, mentation intact and speech normal  PSYCH: mentation appears normal, affect normal/bright    Diagnostic Test Results:  none     ASSESSMENT/PLAN:   1. Need for prophylactic vaccination and inoculation against influenza    - Urine Microscopic  - FLU VACCINE, INCREASED ANTIGEN, PRESV FREE, AGE 65+ [24443]  - Vaccine Administration, Initial [78291]    2. Hyperlipidemia LDL goal <70  Stable. Continues simvastatin.   - simvastatin (ZOCOR) 40 MG  tablet; TAKE 1 TABLET (40 MG) BY MOUTH AT BEDTIME  Dispense: 90 tablet; Refill: 3    3. Anxiety  Stable. She is only taking a half tablet daily. The current medical regimen is effective;  continue present plan and medications.  - diazepam (VALIUM) 2 MG tablet; TAKE 1/2 -  1 TABLET BY MOUTH AT BEDTIME FOR ANXIETY  Dispense: 60 tablet; Refill: 1    4. Paroxysmal atrial fibrillation (H)  Stable. Followed by Cardio. The current medical regimen is effective;  continue present plan and medications.  - warfarin (COUMADIN) 5 MG tablet; TAKE 1 & 1/2 TABLETS ON WED, AND 1 TAB REST OF WEEK  Dispense: 90 tablet; Refill: 3  - CBC with platelets differential  - Comprehensive metabolic panel    5. Skin lesion  Prescribed mometasone cream last visit, but pt did not apply it as recommended. She will schedule with dermatology for further evaluation and possible biopsy.   - DERMATOLOGY REFERRAL    6. Abdominal pressure  Prominence in the left periumbilical region. Difficult to distinguish with her surgeries and scar tissue. She will schedule abdominal US for further evaluation.   - US Abdomen Complete; Future  - CBC with platelets differential  - Comprehensive metabolic panel    7. Lymphoma, small lymphocytic (H)  Stable. Followed by oncology.  - US Abdomen Complete; Future  - CBC with platelets differential  - Comprehensive metabolic panel    Left ankle swelling - Per patient instructions.     Patient Instructions     Schedule with dermatology for biopsy of skin lesion.     Try wrapping foot with an ACE bandage.     Schedule an abdominal ultrasound.     [facility]    If you have any questions regarding to your visit please contact your care team:     Team Pink:   Clinic Hours Telephone Number   Internal Medicine:  Dr. Adela Yin NP       7am-7pm  Monday - Thursday   7am-5pm  Fridays  [phone]  (Appointment scheduling available 24/7)    Questions about your visit?  Team  Line  [phone]   Urgent Care - [place] and [place] - 11am-9pm Monday-Friday Saturday-Sunday- 9am-5pm   [place] - 5pm-9pm Monday-Friday Saturday-Sunday- 9am-5pm  [phone] - [place]  [phone] - [place]       What options do I have for visits at the clinic other than the traditional office visit?  To expand how we care for you, many of our providers are utilizing electronic visits (e-visits) and telephone visits, when medically appropriate, for interactions with their patients rather than a visit in the clinic.   We also offer nurse visits for many medical concerns. Just like any other service, we will bill your insurance company for this type of visit based on time spent on the phone with your provider. Not all insurance companies cover these visits. Please check with your medical insurance if this type of visit is covered. You will be responsible for any charges that are not paid by your insurance.      E-visits via Filement:  generally incur a $35.00 fee.  Telephone visits:  Time spent on the phone: *charged based on time that is spent on the phone in increments of 10 minutes. Estimated cost:   5-10 mins $30.00   11-20 mins. $59.00   21-30 mins. $85.00   Use Yoombat (secure email communication and access to your chart) to send your primary care provider a message or make an appointment. Ask someone on your Team how to sign up for Filement.    For a Price Quote for your services, please call our Consumer Price Line at [phone].    As always, Thank you for trusting us with your health care needs!    Chiara Mercer, DENISE        I spent 19 minutes of time with the patient and >50% of it was in education and counseling regarding bladder pain.    The information in this document, created by the medical scribe for me, accurately reflects the services I personally performed and the decisions made by me. I have reviewed and approved this document for accuracy prior to leaving the patient  care area.  Adela Morgan MD  12:24 PM, 09/22/17    Adela Morgan MD  [facility]    Start 12:24 PM  End 12:43 PM

## 2017-09-22 NOTE — PROGRESS NOTES
ANTICOAGULATION FOLLOW-UP CLINIC VISIT    Patient Name:  Shannan Magdaleno  Date:  9/22/2017  Contact Type:  Face to Face    SUBJECTIVE:     Patient Findings     Positives No Problem Findings           OBJECTIVE    INR Protime   Date Value Ref Range Status   09/22/2017 2.0 (A) 0.86 - 1.14 Final       ASSESSMENT / PLAN  No question data found.  Anticoagulation Summary as of 9/22/2017     INR goal 2.0-3.0   Today's INR 2.0   Maintenance plan 2.5 mg (5 mg x 0.5) on Mon, Fri; 5 mg (5 mg x 1) all other days   Full instructions 9/22: 2.5 mg; Otherwise 2.5 mg on Mon, Fri; 5 mg all other days   Weekly total 30 mg   Plan last modified Moon Lobato RN (9/22/2017)   Next INR check 10/6/2017   Priority INR   Target end date     Indications   Paroxysmal atrial fibrillation (H) [I48.0]  Long-term (current) use of anticoagulants [Z79.01] [Z79.01]         Anticoagulation Episode Summary     INR check location     Preferred lab     Send INR reminders to [facility]    Comments       Anticoagulation Care Providers     Provider Role Specialty Phone number    Adela Morgan MD Responsible Internal Medicine [phone]            See the Encounter Report to view Anticoagulation Flowsheet and Dosing Calendar (Go to Encounters tab in chart review, and find the Anticoagulation Therapy Visit)    Dosage adjustment made based on physician directed care plan.    Moon Lobato RN

## 2017-09-22 NOTE — MR AVS SNAPSHOT
Shannan Churchillcheco   9/22/2017 12:45 PM   Anticoagulation Therapy Visit    Description:  85 year old female   Provider:  DARA ANTI COAG   Department:  Dara Nurse           INR as of 9/22/2017     Today's INR 2.0      Anticoagulation Summary as of 9/22/2017     INR goal 2.0-3.0   Today's INR 2.0   Full instructions 9/22: 2.5 mg; Otherwise 2.5 mg on Mon, Fri; 5 mg all other days   Next INR check 10/6/2017    Indications   Paroxysmal atrial fibrillation (H) [I48.0]  Long-term (current) use of anticoagulants [Z79.01] [Z79.01]         Your next Anticoagulation Clinic appointment(s)     Oct 06, 2017 11:30 AM CDT   Anticoagulation Visit with DARA ANTI DEBRA   [facility]    [address] [address] [phone]              Contact Numbers     [facility]  Please call [phone] to cancel and/or reschedule your appointment   Please call [phone] with any problems or questions regarding your therapy.        September 2017 Details    Sun Mon Tue Wed Thu Fri Sat          1               2                 3               4               5               6               7               8               9                 10               11               12               13               14               15               16                 17               18               19               20               21               22      2.5 mg   See details      23      5 mg           24      5 mg         25      2.5 mg         26      5 mg         27      5 mg         28      5 mg         29      2.5 mg         30      5 mg          Date Details   09/22 This INR check               How to take your warfarin dose     To take:  2.5 mg Take 0.5 of a 5 mg tablet.    To take:  5 mg Take 1 of the 5 mg tablets.           October 2017 Details    Sun Mon Tue Wed Thu Fri Sat     1      5 mg         2      2.5 mg         3      5 mg         4      5 mg         5      5 mg          6            7                 8               9               10               11               12               13               14                 15               16               17               18               19               20               21                 22               23               24               25               26               27               28                 29               30               31                    Date Details   No additional details    Date of next INR:  10/6/2017         How to take your warfarin dose     To take:  2.5 mg Take 0.5 of a 5 mg tablet.    To take:  5 mg Take 1 of the 5 mg tablets.

## 2017-09-22 NOTE — MR AVS SNAPSHOT
After Visit Summary   9/22/2017    Shannan Magdaleno    MRN: [id number]           Patient Information     Date Of Birth          12/2/1931        Visit Information        Provider Department      9/22/2017 12:00 PM Adela Morgan MD [facility]        Today's Diagnoses     Need for prophylactic vaccination and inoculation against influenza    -  1    Hyperlipidemia LDL goal <70        Anxiety        Paroxysmal atrial fibrillation (H)        Skin lesion        Abdominal pressure        Lymphoma, small lymphocytic (H)          Care Instructions    Schedule with dermatology for biopsy of skin lesion.     Try wrapping foot with an ACE bandage.     Schedule an abdominal ultrasound.     [facility]    If you have any questions regarding to your visit please contact your care team:     Team Pink:   Clinic Hours Telephone Number   Internal Medicine:  Dr. Adela Yin NP       7am-7pm  Monday - Thursday   7am-5pm  Fridays  [phone]  (Appointment scheduling available 24/7)    Questions about your visit?  Team Line  [phone]   Urgent Care - [place] and [place] - 11am-9pm Monday-Friday Saturday-Sunday- 9am-5pm   [place] - 5pm-9pm Monday-Friday Saturday-Sunday- 9am-5pm  [phone] - [place]   [phone] - [place]       What options do I have for visits at the clinic other than the traditional office visit?  To expand how we care for you, many of our providers are utilizing electronic visits (e-visits) and telephone visits, when medically appropriate, for interactions with their patients rather than a visit in the clinic.   We also offer nurse visits for many medical concerns. Just like any other service, we will bill your insurance company for this type of visit based on time spent on the phone with your provider. Not all insurance companies cover these visits. Please check with your medical insurance if this  type of visit is covered. You will be responsible for any charges that are not paid by your insurance.      E-visits via Accurate Grouphart:  generally incur a $35.00 fee.  Telephone visits:  Time spent on the phone: *charged based on time that is spent on the phone in increments of 10 minutes. Estimated cost:   5-10 mins $30.00   11-20 mins. $59.00   21-30 mins. $85.00   Use Accurate Grouphart (secure email communication and access to your chart) to send your primary care provider a message or make an appointment. Ask someone on your Team how to sign up for Spoondate.    For a Price Quote for your services, please call our [place] Discovery Line at [phone].    As always, Thank you for trusting us with your health care needs!    Chiara Mercer CMA            Follow-ups after your visit        Additional Services     DERMATOLOGY REFERRAL       Your provider has referred you to: [facility] (2 locations) [phone]   [email or web address]    Please be aware that coverage of these services is subject to the terms and limitations of your health insurance plan.  Call member services at your health plan with any benefit or coverage questions.      Please bring the following with you to your appointment:    (1) Any X-Rays, CTs or MRIs which have been performed.  Contact the facility where they were done to arrange for  prior to your scheduled appointment.    (2) List of current medications  (3) This referral request   (4) Any documents/labs given to you for this referral                  Your next 10 appointments already scheduled     Sep 22, 2017 12:45 PM CDT   Anticoagulation Visit with ABIGAIL ANTI COAG   [facility])    [address] [place] [address] [place] [address]   [phone]            Nov 13, 2017 10:00 AM CST   New Visit with Jamari Stewart MD   [facility] ([facility])    [address] [place] [address]    [phone]              Future tests that were ordered for you today     Open Future Orders        Priority Expected Expires Ordered    US Abdomen Complete Routine  9/22/2018 9/22/2017            Who to contact     If you have questions or need follow up information about today's clinic visit or your schedule please contact [facility] directly at [phone].  Normal or non-critical lab and imaging results will be communicated to you by One True Mediahart, letter or phone within 4 business days after the clinic has received the results. If you do not hear from us within 7 days, please contact the clinic through Threefold Photost or phone. If you have a critical or abnormal lab result, we will notify you by phone as soon as possible.  Submit refill requests through Coub or call your pharmacy and they will forward the refill request to us. Please allow 3 business days for your refill to be completed.          Additional Information About Your Visit        One True MediaharTrustRadius Information     Coub gives you secure access to your electronic health record. If you see a primary care provider, you can also send messages to your care team and make appointments. If you have questions, please call your primary care clinic.  If you do not have a primary care provider, please call [phone] and they will assist you.        Care EveryWhere ID     This is your Care EveryWhere ID. This could be used by other organizations to access your [facility] medical records  [id number]        Your Vitals Were     Pulse Temperature Pulse Oximetry Breastfeeding? BMI (Body Mass Index)       66 97.2  F (36.2  C) (Oral) 98% No 26.74 kg/m2        Blood Pressure from Last 3 Encounters:   09/22/17 104/76   05/08/17 126/66   03/22/17 124/64    Weight from Last 3 Encounters:   09/22/17 146 lb 3.2 oz (66.3 kg)   05/08/17 148 lb (67.1 kg)   03/22/17 147 lb 8 oz (66.9 kg)              We Performed the Following     *UA reflex to Microscopic and Culture ([facility]  and [facility] (except [place] and [place])     CBC with platelets differential     Comprehensive metabolic panel     DERMATOLOGY REFERRAL     FLU VACCINE, INCREASED ANTIGEN, PRESV FREE, AGE 65+ [[id number]]     PAF COMPLETED     Urine Microscopic     Vaccine Administration, Initial [73651]          Today's Medication Changes          These changes are accurate as of: 9/22/17 12:43 PM.  If you have any questions, ask your nurse or doctor.               These medicines have changed or have updated prescriptions.        Dose/Directions    simvastatin 40 MG tablet   Commonly known as:  ZOCOR   This may have changed:  See the new instructions.   Used for:  Hyperlipidemia LDL goal <70   Changed by:  Adela Morgna MD        TAKE 1 TABLET (40 MG) BY MOUTH AT BEDTIME   Quantity:  90 tablet   Refills:  3            Where to get your medicines      These medications were sent to [facility]/pharmacy #[id number] - [place], [place] - [address] [place] [address] [address] [place] [address], [facility] [address]     Phone:  [phone]     simvastatin 40 MG tablet    warfarin 5 MG tablet         Some of these will need a paper prescription and others can be bought over the counter.  Ask your nurse if you have questions.     Bring a paper prescription for each of these medications     diazepam 2 MG tablet                Primary Care Provider Office Phone # Fax #    Adela Morgan [phone]        [facility] [address]        Equal Access to Services     [facility] AH: Hadii taz ku hadasho Soomaali, waaxda luqadaha, qaybta kaalmada adeegyada, claudia valadez. So [facility] [phone].    ATENCIÓN: Si habla español, tiene a linn disposición servicios gratuitos de asistencia lingüística. Llame al [phone].    We comply with applicable federal civil rights laws and [place] laws. We do not discriminate on the basis of race, color, national origin, age,  disability sex, sexual orientation or gender identity.            Thank you!     Thank you for choosing [facility]  for your care. Our goal is always to provide you with excellent care. Hearing back from our patients is one way we can continue to improve our services. Please take a few minutes to complete the written survey that you may receive in the mail after your visit with us. Thank you!             Your Updated Medication List - Protect others around you: Learn how to safely use, store and throw away your medicines at www.disposemymeds.org.          This list is accurate as of: 9/22/17 12:43 PM.  Always use your most recent med list.                   Brand Name Dispense Instructions for use Diagnosis    calcium-vitamin D 600-400 MG-UNIT per tablet    CALTRATE     Take 1 tablet by mouth daily        CENTRUM SILVER per tablet     30 tablet    Take 1 tablet by mouth daily Has 2000 IU of Vitamin D in it.    Osteopenia       diazepam 2 MG tablet    VALIUM    60 tablet    TAKE 1/2 -  1 TABLET BY MOUTH AT BEDTIME FOR ANXIETY    Anxiety       escitalopram 10 MG tablet    LEXAPRO    90 tablet    Take 1 tablet (10 mg) by mouth daily    Anxiety       metoprolol 25 MG tablet    LOPRESSOR    270 tablet    Take 1.5 tablets (37.5 mg) by mouth 2 times daily    Paroxysmal atrial fibrillation (H)       mometasone 0.1 % cream    ELOCON    15 g    Apply sparingly to affected area twice daily for 1 week.    Skin lesion of left leg       nitroGLYcerin 0.4 MG sublingual tablet    NITROSTAT    25 tablet    Place 1 tablet (0.4 mg) under the tongue every 5 minutes as needed for chest pain    Coronary artery disease involving native coronary artery of native heart without angina pectoris       simvastatin 40 MG tablet    ZOCOR    90 tablet    TAKE 1 TABLET (40 MG) BY MOUTH AT BEDTIME    Hyperlipidemia LDL goal <70       TYLENOL 500 MG tablet   Generic drug:  acetaminophen      Take 1-2 tablets by mouth every 6 hours as  needed.        warfarin 5 MG tablet    COUMADIN    90 tablet    TAKE 1 & 1/2 TABLETS ON WED, AND 1 TAB REST OF WEEK    Paroxysmal atrial fibrillation (H)

## 2017-09-22 NOTE — NURSING NOTE
"Chief Complaint   Patient presents with     Urinary Problem     Bladder pain     Health Maintenance     PHQ9 / Fall Risk / DAP     Recheck Medication     Discuss Metoprolol dosing. And Elocon cream.     Flu Shot       Initial /76 (BP Location: Right arm, Cuff Size: Adult Regular)  Pulse 66  Temp 97.2  F (36.2  C) (Oral)  Wt 146 lb 3.2 oz (66.3 kg)  SpO2 98%  Breastfeeding? No  BMI 26.74 kg/m2 Estimated body mass index is 26.74 kg/(m^2) as calculated from the following:    Height as of 5/8/17: 5' 2\" (1.575 m).    Weight as of this encounter: 146 lb 3.2 oz (66.3 kg).  Medication Reconciliation: complete       Chiara Mercer, DENISE      "

## 2017-09-22 NOTE — PATIENT INSTRUCTIONS
Schedule with dermatology for biopsy of skin lesion.     Try wrapping foot with an ACE bandage.     Schedule an abdominal ultrasound.     [facility]    If you have any questions regarding to your visit please contact your care team:     Team Pink:   Clinic Hours Telephone Number   Internal Medicine:  Dr. Adela Yin NP       7am-7pm  Monday - Thursday   7am-5pm  Fridays  [phone]  (Appointment scheduling available 24/7)    Questions about your visit?  Team Line  [phone]   Urgent Care - [place] - 11am-9pm Monday-Friday Saturday-Sunday- 9am-5pm   [place] - 5pm-9pm Monday-Friday Saturday-Sunday- 9am-5pm  [phone] - [facility]  [phone] - [place]       What options do I have for visits at the clinic other than the traditional office visit?  To expand how we care for you, many of our providers are utilizing electronic visits (e-visits) and telephone visits, when medically appropriate, for interactions with their patients rather than a visit in the clinic.   We also offer nurse visits for many medical concerns. Just like any other service, we will bill your insurance company for this type of visit based on time spent on the phone with your provider. Not all insurance companies cover these visits. Please check with your medical insurance if this type of visit is covered. You will be responsible for any charges that are not paid by your insurance.      E-visits via Dog [employer]:  generally incur a $35.00 fee.  Telephone visits:  Time spent on the phone: *charged based on time that is spent on the phone in increments of 10 minutes. Estimated cost:   5-10 mins $30.00   11-20 mins. $59.00   21-30 mins. $85.00   Use UNYQt (secure email communication and access to your chart) to send your primary care provider a message or make an appointment. Ask someone on your Team how to sign up for [employer].    For a Price Quote for your  services, please call our Consumer Price Line at [phone].    As always, Thank you for trusting us with your health care needs!    Chiara Mercer, CMA

## 2017-09-26 ENCOUNTER — RADIANT APPOINTMENT (OUTPATIENT)
Dept: ULTRASOUND IMAGING | Facility: CLINIC | Age: 82
End: 2017-09-26
Attending: INTERNAL MEDICINE
Payer: COMMERCIAL

## 2017-09-26 DIAGNOSIS — C83.00 LYMPHOMA, SMALL LYMPHOCYTIC (H): ICD-10-CM

## 2017-09-26 DIAGNOSIS — R10.9 ABDOMINAL PRESSURE: ICD-10-CM

## 2017-09-26 PROCEDURE — 76700 US EXAM ABDOM COMPLETE: CPT

## 2017-09-27 NOTE — PROGRESS NOTES
There is no fluid in the abdomen or masses.  There are enlarged lymph nodes which is expected with the lymphoma.   Dr. Morgan

## 2017-09-29 ENCOUNTER — TRANSFERRED RECORDS (OUTPATIENT)
Dept: HEALTH INFORMATION MANAGEMENT | Facility: CLINIC | Age: 82
End: 2017-09-29

## 2017-09-29 ENCOUNTER — TELEPHONE (OUTPATIENT)
Dept: INTERNAL MEDICINE | Facility: CLINIC | Age: 82
End: 2017-09-29
Payer: COMMERCIAL

## 2017-09-29 NOTE — TELEPHONE ENCOUNTER
Called cell number and no answer/unable to leave message. Called home number and left message for patient to call us back in regards to her office visit with Dr. Morgan on 9/22/2017. I am wondering if she received a flu shot that day or not.    Chiara Mercer, CMA

## 2017-10-02 PROCEDURE — 90662 IIV NO PRSV INCREASED AG IM: CPT | Performed by: INTERNAL MEDICINE

## 2017-10-02 NOTE — TELEPHONE ENCOUNTER
Patient called back and informed she did receive this, will document and close encounter.     Mily DENT CMA ([facility])

## 2017-10-02 NOTE — TELEPHONE ENCOUNTER
Called and left message to call pink team back.     Mily DENT CMA ([facility])

## 2017-10-05 ENCOUNTER — TRANSFERRED RECORDS (OUTPATIENT)
Dept: HEALTH INFORMATION MANAGEMENT | Facility: CLINIC | Age: 82
End: 2017-10-05

## 2017-10-06 ENCOUNTER — ANTICOAGULATION THERAPY VISIT (OUTPATIENT)
Dept: NURSING | Facility: CLINIC | Age: 82
End: 2017-10-06
Payer: COMMERCIAL

## 2017-10-06 DIAGNOSIS — Z79.01 LONG-TERM (CURRENT) USE OF ANTICOAGULANTS: ICD-10-CM

## 2017-10-06 DIAGNOSIS — I48.0 PAROXYSMAL ATRIAL FIBRILLATION (H): ICD-10-CM

## 2017-10-06 LAB — INR POINT OF CARE: 1.9 (ref 0.86–1.14)

## 2017-10-06 PROCEDURE — 99207 ZZC NO CHARGE NURSE ONLY: CPT

## 2017-10-06 PROCEDURE — 36416 COLLJ CAPILLARY BLOOD SPEC: CPT

## 2017-10-06 PROCEDURE — 85610 PROTHROMBIN TIME: CPT | Mod: QW

## 2017-10-06 NOTE — MR AVS SNAPSHOT
Shannan Churchillcheco   10/6/2017 11:30 AM   Anticoagulation Therapy Visit    Description:  85 year old female   Provider:  DARA ANTI COAG   Department:  Dara Nurse           INR as of 10/6/2017     Today's INR 1.9!      Anticoagulation Summary as of 10/6/2017     INR goal 2.0-3.0   Today's INR 1.9!   Full instructions 2.5 mg on Mon, Fri; 5 mg all other days   Next INR check 10/20/2017    Indications   Paroxysmal atrial fibrillation (H) [I48.0]  Long-term (current) use of anticoagulants [Z79.01] [Z79.01]         Your next Anticoagulation Clinic appointment(s)     Oct 06, 2017 11:30 AM CDT   Anticoagulation Visit with DARA ANTI COAG   [facility] ([address] [id number] [phone]            Oct 20, 2017 11:00 AM CDT   Anticoagulation Visit with DARA ANTI COAG   [facility] ([address] [address] [phone]              Contact Numbers     [facility]  Please call [phone] to cancel and/or reschedule your appointment   Please call [phone] with any problems or questions regarding your therapy.        October 2017 Details    Sun Mon Tue Wed Thu Fri Sat     1               2               3               4               5               6      2.5 mg   See details      7      5 mg           8      5 mg         9      2.5 mg         10      5 mg         11      5 mg         12      5 mg         13      2.5 mg         14      5 mg           15      5 mg         16      2.5 mg         17      5 mg         18      5 mg         19      5 mg         20            21                 22               23               24               25               26               27               28                 29               30               31                    Date Details   10/06 This INR check       Date of next INR:  10/20/2017         How to take your warfarin dose     To take:  2.5 mg  Take 0.5 of a 5 mg tablet.    To take:  5 mg Take 1 of the 5 mg tablets.

## 2017-10-08 DIAGNOSIS — I48.0 PAROXYSMAL ATRIAL FIBRILLATION (H): ICD-10-CM

## 2017-10-09 RX ORDER — METOPROLOL TARTRATE 25 MG/1
TABLET, FILM COATED ORAL
Qty: 270 TABLET | Refills: 3
Start: 2017-10-09

## 2017-10-09 NOTE — TELEPHONE ENCOUNTER
Spoke to pharmacy and confirmed duplicate request.    metoprolol (LOPRESSOR) 25 MG tablet 270 tablet 1 9/7/2017  No      Sig: Take 1.5 tablets (37.5 mg) by mouth 2 times daily     Class: E-Prescribe     Route: Oral     Order: [id number]     E-Prescribing Status: Receipt confirmed by pharmacy (9/7/2017 10:15 AM CDT)           Nataly AGUILAR CMA ([facility])

## 2017-10-10 DIAGNOSIS — I48.0 PAROXYSMAL ATRIAL FIBRILLATION (H): ICD-10-CM

## 2017-10-10 RX ORDER — METOPROLOL TARTRATE 25 MG/1
TABLET, FILM COATED ORAL
Qty: 270 TABLET | Refills: 3
Start: 2017-10-10

## 2017-10-10 NOTE — TELEPHONE ENCOUNTER
Called and verified with pharmacy on duplicate prescription. Please disregard. Liset Alanis MA

## 2017-10-14 ENCOUNTER — MYC MEDICAL ADVICE (OUTPATIENT)
Dept: INTERNAL MEDICINE | Facility: CLINIC | Age: 82
End: 2017-10-14

## 2017-10-16 NOTE — TELEPHONE ENCOUNTER
Call back from [facility]. They did not see this patient until 10-5-17. The results are not back yet. This was not a referral from dr avilez. They will not be able to release these records with out patient signing a MARGARITA.

## 2017-10-16 NOTE — TELEPHONE ENCOUNTER
Patient was referred on 9/22/17 to  [facility] (2 locations) [phone].    Had biopsy on 7/12/17.     TC please obtain records. Thanks!      Lauryn Yeager RN

## 2017-10-18 NOTE — TELEPHONE ENCOUNTER
I called [facility] today to follow-up on this message.  Per [facility], patient's daughter was called yesterday with the results. Marifer Nieves,

## 2017-10-19 ENCOUNTER — ANTICOAGULATION THERAPY VISIT (OUTPATIENT)
Dept: NURSING | Facility: CLINIC | Age: 82
End: 2017-10-19
Payer: COMMERCIAL

## 2017-10-19 ENCOUNTER — OFFICE VISIT (OUTPATIENT)
Dept: FAMILY MEDICINE | Facility: CLINIC | Age: 82
End: 2017-10-19
Payer: COMMERCIAL

## 2017-10-19 ENCOUNTER — ANTICOAGULATION THERAPY VISIT (OUTPATIENT)
Dept: NURSING | Facility: CLINIC | Age: 82
End: 2017-10-19

## 2017-10-19 VITALS
HEART RATE: 70 BPM | SYSTOLIC BLOOD PRESSURE: 108 MMHG | OXYGEN SATURATION: 97 % | TEMPERATURE: 98.2 F | HEIGHT: 62 IN | DIASTOLIC BLOOD PRESSURE: 74 MMHG | WEIGHT: 151 LBS | BODY MASS INDEX: 27.79 KG/M2

## 2017-10-19 DIAGNOSIS — L08.9 LOCAL INFECTION OF WOUND: Primary | ICD-10-CM

## 2017-10-19 DIAGNOSIS — Z79.01 LONG-TERM (CURRENT) USE OF ANTICOAGULANTS: ICD-10-CM

## 2017-10-19 DIAGNOSIS — I48.0 PAROXYSMAL ATRIAL FIBRILLATION (H): ICD-10-CM

## 2017-10-19 DIAGNOSIS — T14.8XXA LOCAL INFECTION OF WOUND: Primary | ICD-10-CM

## 2017-10-19 LAB — INR POINT OF CARE: 1.7 (ref 0.86–1.14)

## 2017-10-19 PROCEDURE — 99213 OFFICE O/P EST LOW 20 MIN: CPT | Performed by: FAMILY MEDICINE

## 2017-10-19 PROCEDURE — 36416 COLLJ CAPILLARY BLOOD SPEC: CPT | Performed by: INTERNAL MEDICINE

## 2017-10-19 PROCEDURE — 99207 ZZC NO CHARGE NURSE ONLY: CPT | Performed by: INTERNAL MEDICINE

## 2017-10-19 PROCEDURE — 85610 PROTHROMBIN TIME: CPT | Mod: QW | Performed by: INTERNAL MEDICINE

## 2017-10-19 RX ORDER — MUPIROCIN 20 MG/G
OINTMENT TOPICAL
Qty: 30 G | Refills: 1 | Status: SHIPPED | OUTPATIENT
Start: 2017-10-19 | End: 2018-03-05

## 2017-10-19 NOTE — PROGRESS NOTES
ANTICOAGULATION FOLLOW-UP CLINIC VISIT    Patient Name:  Shannan Magdaleno  Date:  10/19/2017  Contact Type:  Face to Face    SUBJECTIVE:     Patient Findings     Positives Antibiotic use or infection (Patient started on ATB for leg infection.), No Problem Findings           OBJECTIVE    INR Protime   Date Value Ref Range Status   10/19/2017 1.7 (A) 0.86 - 1.14 Final       ASSESSMENT / PLAN  No question data found.  Anticoagulation Summary as of 10/19/2017     INR goal 2.0-3.0   Today's INR 1.7!   Maintenance plan 2.5 mg (5 mg x 0.5) on Mon, Fri; 5 mg (5 mg x 1) all other days   Full instructions 10/20: 5 mg; Otherwise 2.5 mg on Mon, Fri; 5 mg all other days   Weekly total 30 mg   Plan last modified Moon Lobato RN (9/22/2017)   Next INR check 10/25/2017   Priority INR   Target end date     Indications   Paroxysmal atrial fibrillation (H) [I48.0]  Long-term (current) use of anticoagulants [Z79.01] [Z79.01]         Anticoagulation Episode Summary     INR check location     Preferred lab     Send INR reminders to [facility]    Comments       Anticoagulation Care Providers     Provider Role Specialty Phone number    Adela Morgan MD [facility] Internal Medicine [phone]            See the Encounter Report to view Anticoagulation Flowsheet and Dosing Calendar (Go to Encounters tab in chart review, and find the Anticoagulation Therapy Visit)    Dosage adjustment made based on physician directed care plan.    Moon Lobato RN

## 2017-10-19 NOTE — PATIENT INSTRUCTIONS
[facility]    If you have any questions regarding to your visit please contact your care team:       Team Purple:   Clinic Hours Telephone Number   Dr. Nikki Murray   7am-7pm  Monday - Thursday   7am-5pm  Fridays  [phone]  (Appointment scheduling available 24/7)    Questions about your Visit?   Team Line:  [phone]   Urgent Care - [place] and [facility] - 11am-9pm Monday-Friday Saturday-Sunday- 9am-5pm   [place] - 5pm-9pm Monday-Friday Saturday-Sunday- 9am-5pm  [phone] - [facility]  [phone] - [place]       What options do I have for visits at the clinic other than the traditional office visit?  To expand how we care for you, many of our providers are utilizing electronic visits (e-visits) and telephone visits, when medically appropriate, for interactions with their patients rather than a visit in the clinic.   We also offer nurse visits for many medical concerns. Just like any other service, we will bill your insurance company for this type of visit based on time spent on the phone with your provider. Not all insurance companies cover these visits. Please check with your medical insurance if this type of visit is covered. You will be responsible for any charges that are not paid by your insurance.      E-visits via MobileDevHQ:  generally incur a $35.00 fee.  Telephone visits:  Time spent on the phone: *charged based on time that is spent on the phone in increments of 10 minutes. Estimated cost:   5-10 mins $30.00   11-20 mins. $59.00   21-30 mins. $85.00     Use Code Feverhart (secure email communication and access to your chart) to send your primary care provider a message or make an appointment. Ask someone on your Team how to sign up for Mobile[employer]HQ.  For a Price Quote for your services, please call our Consumer Price Line at [phone].  As always, Thank you for trusting us with your health care needs!

## 2017-10-19 NOTE — PROGRESS NOTES
"Chief Complaint   Patient presents with     wound on left ankle not healing     SUBJECTIVE:  This 85 year old female is here today because she had a biopsy of a mole on her left outer lower leg about 1 week ago. It bled a lot. She has been putting neosporin on the open wound and keeping it covered with band aid. It doesn't seem to be healing well. She was told that the biopsy result was benign.  All other review of systems are negative  n.Personal, family, and social history reviewed with patient and revised.  OBJECTIVE:  Vital signs:  Temp: 98.2  F (36.8  C)   BP: 108/74 Pulse: 70     SpO2: 97 %     Height: 5' 2\" (157.5 cm) Weight: 151 lb (68.5 kg)  Estimated body mass index is 27.62 kg/(m^2) as calculated from the following:    Height as of this encounter: 5' 2\" (1.575 m).    Weight as of this encounter: 151 lb (68.5 kg).       patient has punch biopsy wound that is moist and has some local surrounding redness, but no tenderness. This could just be a local reaction to the neosporin plus band aid. I don't believe she needs an oral antibiotic.   Gait it normal   Well hydrated  Well nourished  Well groomed  ASSESSMENT / PLAN:  (T14.8XXA,  L08.9) Local infection of wound  (primary encounter diagnosis)  Comment: as above, encouraged keeping wound open to the air  Plan: mupirocin (BACTROBAN) 2 % ointment        Apply TID and keep wound open to the air  Clean daily with light soap and water     NAE GALE M.D.          "

## 2017-10-19 NOTE — MR AVS SNAPSHOT
After Visit Summary   10/19/2017    Shannan Magdaleno    MRN: [id number]           Patient Information     Date Of Birth          12/2/1931        Visit Information        Provider Department      10/19/2017 2:30 PM Nikki Dean MD [facility]        Today's Diagnoses     Local infection of wound    -  1      Care Instructions    [facility]    If you have any questions regarding to your visit please contact your care team:       Team Purple:   Clinic Hours Telephone Number   Dr. Nikki Murray   7am-7pm  Monday - Thursday   7am-5pm  Fridays  [phone]  (Appointment scheduling available 24/7)    Questions about your Visit?   Team Line:  [phone]   Urgent Care - [place] and [facility] - 11am-9pm Monday-Friday Saturday-Sunday- 9am-5pm   [place] - 5pm-9pm Monday-Friday Saturday-Sunday- 9am-5pm  [phone] - [facility]  [phone] - [place]       What options do I have for visits at the clinic other than the traditional office visit?  To expand how we care for you, many of our providers are utilizing electronic visits (e-visits) and telephone visits, when medically appropriate, for interactions with their patients rather than a visit in the clinic.   We also offer nurse visits for many medical concerns. Just like any other service, we will bill your insurance company for this type of visit based on time spent on the phone with your provider. Not all insurance companies cover these visits. Please check with your medical insurance if this type of visit is covered. You will be responsible for any charges that are not paid by your insurance.      E-visits via Lucky Pai:  generally incur a $35.00 fee.  Telephone visits:  Time spent on the phone: *charged based on time that is spent on the phone in increments of 10 minutes. Estimated cost:   5-10 mins $30.00   11-20 mins. $59.00   21-30  mins. $85.00     Use Agendize (secure email communication and access to your chart) to send your primary care provider a message or make an appointment. Ask someone on your Team how to sign up for Agend[employer].  For a Price Quote for your services, please call our Consumer Price Line at [phone].  As always, Thank you for trusting us with your health care needs!              Follow-ups after your visit        Your next 10 appointments already scheduled     Oct 20, 2017 11:00 AM CDT   Anticoagulation Visit with FZ ANTI COAPARUL   [facility]    [address] [place] [id number] [phone]            Nov 13, 2017 10:00 AM CST   New Visit with Jamari Stewart MD   [facility] ([address] [phone]              Who to contact     If you have questions or need follow up information about today's clinic visit or your schedule please contact [facility] directly at [phone].  Normal or non-critical lab and imaging results will be communicated to you by [employer], letter or phone within 4 business days after the clinic has received the results. If you do not hear from us within 7 days, please contact the clinic through [employer] or phone. If you have a critical or abnormal lab result, we will notify you by phone as soon as possible.  Submit refill requests through Agendize or call your pharmacy and they will forward the refill request to us. Please allow 3 business days for your refill to be completed.          Additional Information About Your Visit        [employer] Information     Agendize gives you secure access to your electronic health record. If you see a primary care provider, you can also send messages to your care team and make appointments. If you have questions, please call your primary care clinic.  If you do not have a primary care provider, please call [phone] and  "they will assist you.        Care EveryWhere ID     This is your Care EveryWhere ID. This could be used by other organizations to access your [place] medical records  [id number]        Your Vitals Were     Pulse Temperature Height Pulse Oximetry BMI (Body Mass Index)       70 98.2  F (36.8  C) 5' 2\" (1.575 m) 97% 27.62 kg/m2        Blood Pressure from Last 3 Encounters:   10/19/17 108/74   09/22/17 104/76   05/08/17 126/66    Weight from Last 3 Encounters:   10/19/17 151 lb (68.5 kg)   09/22/17 146 lb 3.2 oz (66.3 kg)   05/08/17 148 lb (67.1 kg)              Today, you had the following     No orders found for display         Today's Medication Changes          These changes are accurate as of: 10/19/17  2:38 PM.  If you have any questions, ask your nurse or doctor.               Start taking these medicines.        Dose/Directions    mupirocin 2 % ointment   Commonly known as:  BACTROBAN   Used for:  Local infection of wound   Started by:  Nikki Dean MD        Apply to infected wound three times a day   Quantity:  30 g   Refills:  1            Where to get your medicines      These medications were sent to [facility]/pharmacy #[id number] - [place], [place] - [address] [place] [address] 10 AT CORNER OF [address], [facility] [address]     Phone:  [phone]     mupirocin 2 % ointment                Primary Care Provider Office Phone # Fax #    Adela Morgan [phone] [address] [facility] [address]        Equal Access to Services     [facility] Hospital[facility] AH: Haddoc Silva, carol aj, claudia nguyen. So [facility] [phone].    ATENCIÓN: Si habla español, tiene a linn disposición servicios gratuitos de asistencia lingüística. Llame al [phone].    We comply with applicable federal civil rights laws and [place] laws. We do not discriminate on the basis of race, color, national origin, age, " disability, sex, sexual orientation, or gender identity.            Thank you!     Thank you for choosing [facility]  for your care. Our goal is always to provide you with excellent care. Hearing back from our patients is one way we can continue to improve our services. Please take a few minutes to complete the written survey that you may receive in the mail after your visit with us. Thank you!             Your Updated Medication List - Protect others around you: Learn how to safely use, store and throw away your medicines at www.disposemymeds.org.          This list is accurate as of: 10/19/17  2:38 PM.  Always use your most recent med list.                   Brand Name Dispense Instructions for use Diagnosis    calcium-vitamin D 600-400 MG-UNIT per tablet    CALTRATE     Take 1 tablet by mouth daily        CENTRUM SILVER per tablet     30 tablet    Take 1 tablet by mouth daily Has 2000 IU of Vitamin D in it.    Osteopenia       diazepam 2 MG tablet    VALIUM    60 tablet    TAKE 1/2 -  1 TABLET BY MOUTH AT BEDTIME FOR ANXIETY    Anxiety       escitalopram 10 MG tablet    LEXAPRO    90 tablet    Take 1 tablet (10 mg) by mouth daily    Anxiety       metoprolol 25 MG tablet    LOPRESSOR    270 tablet    Take 1.5 tablets (37.5 mg) by mouth 2 times daily    Paroxysmal atrial fibrillation (H)       mometasone 0.1 % cream    ELOCON    15 g    Apply sparingly to affected area twice daily for 1 week.    Skin lesion of left leg       mupirocin 2 % ointment    BACTROBAN    30 g    Apply to infected wound three times a day    Local infection of wound       nitroGLYcerin 0.4 MG sublingual tablet    NITROSTAT    25 tablet    Place 1 tablet (0.4 mg) under the tongue every 5 minutes as needed for chest pain    Coronary artery disease involving native coronary artery of native heart without angina pectoris       simvastatin 40 MG tablet    ZOCOR    90 tablet    TAKE 1 TABLET (40 MG) BY MOUTH AT BEDTIME     Hyperlipidemia LDL goal <70       TYLENOL 500 MG tablet   Generic drug:  acetaminophen      Take 1-2 tablets by mouth every 6 hours as needed.        warfarin 5 MG tablet    COUMADIN    90 tablet    TAKE 1 & 1/2 TABLETS ON WED, AND 1 TAB REST OF WEEK    Paroxysmal atrial fibrillation (H)

## 2017-10-19 NOTE — MR AVS SNAPSHOT
Shannan TERAN Rasta   10/19/2017 3:00 PM   Anticoagulation Therapy Visit    Description:  85 year old female   Provider:  DARA ANTI COAG   Department:  Dara Nurse           INR as of 10/19/2017     Today's INR No new INR was available at the time of this encounter.      Anticoagulation Summary as of 10/19/2017     INR goal 2.0-3.0   Today's INR No new INR was available at the time of this encounter.   Full instructions 10/20: 5 mg; Otherwise 2.5 mg on Mon, Fri; 5 mg all other days   Next INR check 10/25/2017    Indications   Paroxysmal atrial fibrillation (H) [I48.0]  Long-term (current) use of anticoagulants [Z79.01] [Z79.01]         Your next Anticoagulation Clinic appointment(s)     Oct 19, 2017  3:00 PM CDT   Anticoagulation Visit with DARA ANTI COAG   [facility]    [address] [place] [id number] [phone]            Oct 25, 2017  8:45 AM CDT   Anticoagulation Visit with DARA ANTI COAG   [facility]    [address] [place] [address] [phone]              Contact Numbers     [facility]  Please call [phone] to cancel and/or reschedule your appointment   Please call [phone] with any problems or questions regarding your therapy.        October 2017 Details    Sun Mon Tue Wed Thu Fri Sat     1               2               3               4               5               6               7                 8               9               10               11               12               13               14                 15               16               17               18               19      5 mg   See details      20      5 mg         21      5 mg           22      5 mg         23      2.5 mg         24      5 mg         25            26               27               28                 29               30               31                    Date Details   10/19 This INR check       Date  of next INR:  10/25/2017         How to take your warfarin dose     To take:  2.5 mg Take 0.5 of a 5 mg tablet.    To take:  5 mg Take 1 of the 5 mg tablets.

## 2017-10-19 NOTE — MR AVS SNAPSHOT
Shannan TERAN Rasta   10/19/2017   Anticoagulation Therapy Visit    Description:  85 year old female   Provider:  Adela Morgan MD   Department:  Fz Nurse           INR as of 10/19/2017     Today's INR 1.7!      Anticoagulation Summary as of 10/19/2017     INR goal 2.0-3.0   Today's INR 1.7!   Full instructions 10/20: 5 mg; Otherwise 2.5 mg on Mon, Fri; 5 mg all other days   Next INR check 10/25/2017    Indications   Paroxysmal atrial fibrillation (H) [I48.0]  Long-term (current) use of anticoagulants [Z79.01] [Z79.01]         Your next Anticoagulation Clinic appointment(s)     Oct 25, 2017  8:45 AM CDT   Anticoagulation Visit with ABIGAIL ANTI COAG   [facility] ([facility]    [address] [place] [id number] [phone]              Contact Numbers     [facility]  Please call [phone] to cancel and/or reschedule your appointment   Please call [phone] with any problems or questions regarding your therapy.        October 2017 Details    Sun Mon Tue Wed Thu Fri Sat     1               2               3               4               5               6               7                 8               9               10               11               12               13               14                 15               16               17               18               19      5 mg   See details      20      5 mg         21      5 mg           22      5 mg         23      2.5 mg         24      5 mg         25            26               27               28                 29               30               31                    Date Details   10/19 This INR check       Date of next INR:  10/25/2017         How to take your warfarin dose     To take:  2.5 mg Take 0.5 of a 5 mg tablet.    To take:  5 mg Take 1 of the 5 mg tablets.

## 2017-10-19 NOTE — NURSING NOTE
"Chief Complaint   Patient presents with     wound on left ankle not healing       Initial /74 (BP Location: Right arm, Patient Position: Chair, Cuff Size: Adult Regular)  Pulse 70  Temp 98.2  F (36.8  C)  Ht 5' 2\" (1.575 m)  Wt 151 lb (68.5 kg)  SpO2 97%  BMI 27.62 kg/m2 Estimated body mass index is 27.62 kg/(m^2) as calculated from the following:    Height as of this encounter: 5' 2\" (1.575 m).    Weight as of this encounter: 151 lb (68.5 kg).  Medication Reconciliation: complete   Kimberly Vides MA      "

## 2017-10-24 ENCOUNTER — NURSE TRIAGE (OUTPATIENT)
Dept: NURSING | Facility: CLINIC | Age: 82
End: 2017-10-24

## 2017-10-24 NOTE — TELEPHONE ENCOUNTER
Shannan went to skin specialist and had a biopsy done.  Minerva daughter is calling and has questions on where to take mom for wound.

## 2017-10-25 ENCOUNTER — OFFICE VISIT (OUTPATIENT)
Dept: FAMILY MEDICINE | Facility: CLINIC | Age: 82
End: 2017-10-25
Payer: COMMERCIAL

## 2017-10-25 ENCOUNTER — ANTICOAGULATION THERAPY VISIT (OUTPATIENT)
Dept: NURSING | Facility: CLINIC | Age: 82
End: 2017-10-25
Payer: COMMERCIAL

## 2017-10-25 VITALS
OXYGEN SATURATION: 97 % | WEIGHT: 149 LBS | HEART RATE: 59 BPM | SYSTOLIC BLOOD PRESSURE: 148 MMHG | BODY MASS INDEX: 27.25 KG/M2 | DIASTOLIC BLOOD PRESSURE: 66 MMHG

## 2017-10-25 DIAGNOSIS — I48.0 PAROXYSMAL ATRIAL FIBRILLATION (H): ICD-10-CM

## 2017-10-25 DIAGNOSIS — T14.8XXA OPEN WOUND: Primary | ICD-10-CM

## 2017-10-25 DIAGNOSIS — Z79.01 LONG-TERM (CURRENT) USE OF ANTICOAGULANTS: ICD-10-CM

## 2017-10-25 LAB — INR POINT OF CARE: 1.7 (ref 0.86–1.14)

## 2017-10-25 PROCEDURE — 99207 ZZC NO CHARGE NURSE ONLY: CPT

## 2017-10-25 PROCEDURE — 99213 OFFICE O/P EST LOW 20 MIN: CPT | Performed by: PHYSICIAN ASSISTANT

## 2017-10-25 PROCEDURE — 85610 PROTHROMBIN TIME: CPT | Mod: QW

## 2017-10-25 PROCEDURE — 36416 COLLJ CAPILLARY BLOOD SPEC: CPT

## 2017-10-25 RX ORDER — CEPHALEXIN 500 MG/1
500 CAPSULE ORAL 3 TIMES DAILY
Qty: 21 CAPSULE | Refills: 0 | Status: SHIPPED | OUTPATIENT
Start: 2017-10-25 | End: 2017-11-01

## 2017-10-25 NOTE — NURSING NOTE
"Chief Complaint   Patient presents with     Infection     Pt states that he did a lesion on her left leg about 2 weeks ago in [place]. She saying that is it not healing.       Initial /66 (BP Location: Left arm, Patient Position: Chair, Cuff Size: Adult Regular)  Pulse 59  Wt 149 lb (67.6 kg)  SpO2 97%  Breastfeeding? No  BMI 27.25 kg/m2 Estimated body mass index is 27.25 kg/(m^2) as calculated from the following:    Height as of 10/19/17: 5' 2\" (1.575 m).    Weight as of this encounter: 149 lb (67.6 kg).  Medication Reconciliation: complete   Shirley Patel MA        "

## 2017-10-25 NOTE — MR AVS SNAPSHOT
Shannan TERAN Rasta   10/25/2017 8:45 AM   Anticoagulation Therapy Visit    Description:  85 year old female   Provider:  DARA ANTI COAG   Department:  [facility]           INR as of 10/25/2017     Today's INR 1.7!      Anticoagulation Summary as of 10/25/2017     INR goal 2.0-3.0   Today's INR 1.7!   Full instructions 10/27: 5 mg; 10/30: 5 mg; Otherwise 2.5 mg on Mon, Fri; 5 mg all other days   Next INR check 11/1/2017    Indications   Paroxysmal atrial fibrillation (H) [I48.0]  Long-term (current) use of anticoagulants [Z79.01] [Z79.01]         Your next Anticoagulation Clinic appointment(s)     Oct 25, 2017  8:45 AM CDT   Anticoagulation Visit with DARA ANTI COAG   [facility] ([address] [address] [phone]            Nov 01, 2017  9:15 AM CDT   Anticoagulation Visit with DARA ANTI COAG   [facility] ([address] [address] [phone]              Contact Numbers     [facility]  Please call [phone] to cancel and/or reschedule your appointment   Please call [phone] with any problems or questions regarding your therapy.        October 2017 Details    Sun Mon Tue Wed Thu Fri Sat     1               2               3               4               5               6               7                 8               9               10               11               12               13               14                 15               16               17               18               19               20               21                 22               23               24               25      5 mg   See details      26      5 mg         27      5 mg         28      5 mg           29      5 mg         30      5 mg         31      5 mg              Date Details   10/25 This INR check               How to take your warfarin dose     To take:  5 mg Take 1 of the 5 mg tablets.            November 2017 Details    Sun Mon Tue Wed Thu Fri Sat        1            2               3               4                 5               6               7               8               9               10               11                 12               13               14               15               16               17               18                 19               20               21               22               23               24               25                 26               27               28               29               30                  Date Details   No additional details    Date of next INR:  11/1/2017         How to take your warfarin dose     To take:  5 mg Take 1 of the 5 mg tablets.

## 2017-10-25 NOTE — PROGRESS NOTES
SUBJECTIVE:   Shannan Magdaleno is a 85 year old female who presents to clinic today for the following health issues:      Concern - infection in the leg  Onset: biopsy 2 weeks ago    Description:   Left lower leg    Intensity: mild    Progression of Symptoms:  same    Accompanying Signs & Symptoms:  Little pain     Previous history of similar problem:   none    Precipitating factors:   Worsened by: none    Alleviating factors:  Improved by: none    Therapies Tried and outcome: none    Patient had a mole on her left lower leg 2 weeks ago. She was told biopsy result was benign. She had an office visit 1 week ago because she was worried about infection.  She has been putting Bactroban on the wound for the past week and covering the wound with a band aid.  States redness seems a bit worse today.  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     MI (myocardial infarction) 1998    nl stress test in 5/08     OA (osteoarthritis) of knee 4/16/2014     osteoarthritis      Osteopenia      Other and unspecified hyperlipidemia      PVC's 9/00    Event Monitor     Restless legs      Retroperitoneal lymphadenopathy 7/7/2015         Current Outpatient Prescriptions on File Prior to Visit:  mupirocin (BACTROBAN) 2 % ointment Apply to infected wound three times a day   simvastatin (ZOCOR) 40 MG tablet TAKE 1 TABLET (40 MG) BY MOUTH AT BEDTIME   diazepam (VALIUM) 2 MG tablet TAKE 1/2 -  1 TABLET BY MOUTH AT BEDTIME FOR ANXIETY   warfarin (COUMADIN) 5 MG tablet TAKE 1 & 1/2  TABLETS ON WED, AND 1 TAB REST OF WEEK   metoprolol (LOPRESSOR) 25 MG tablet Take 1.5 tablets (37.5 mg) by mouth 2 times daily   mometasone (ELOCON) 0.1 % cream Apply sparingly to affected area twice daily for 1 week.   escitalopram (LEXAPRO) 10 MG tablet Take 1 tablet (10 mg) by mouth daily   nitroglycerin (NITROSTAT) 0.4 MG SL tablet Place 1 tablet (0.4 mg) under the tongue every 5 minutes as needed for chest pain   calcium-vitamin D (CALTRATE) 600-400 MG-UNIT per tablet Take 1 tablet by mouth daily   Multiple Vitamins-Minerals (CENTRUM SILVER) per tablet Take 1 tablet by mouth daily Has 2000 IU of Vitamin D in it.   acetaminophen (TYLENOL) 500 MG tablet Take 1-2 tablets by mouth every 6 hours as needed.     No current facility-administered medications on file prior to visit.     Social History   Substance Use Topics     Smoking status: Never Smoker     Smokeless tobacco: Never Used     Alcohol use No       ROS:  General: negative for fever  SKIN: + as above  Cardio; hx afib on warfarn    Physcial Exam:  /66 (BP Location: Left arm, Patient Position: Chair, Cuff Size: Adult Regular)  Pulse 59  Wt 149 lb (67.6 kg)  SpO2 97%  Breastfeeding? No  BMI 27.25 kg/m2    GENERAL: alert, no acute distress  EYES: conjunctival clear  RESP: Regular breathing rate  VASCULAR: negative herrera  NEURO: awake .  SKIN: the 1.5 cm wound is moist w/granulation tissue, with several cm of irregularly shaped surrounding erythremia. Mild tenderness. No fluctuance or drained.      ASSESSMENT: slow healing wound.  Doubt new infection, wound was outlined. See below.    ICD-10-CM    1. Open wound T14.8XXA cephALEXin (KEFLEX) 500 MG capsule   2. Paroxysmal atrial fibrillation (H) I48.0 ASPIRIN NOT PRESCRIBED (INTENTIONAL)       PLAN:  She was instructed to continue topical antibiotics as she has been using the past week and if the erythemia gets worse (crosses outlined region) to begin taking oral antibiotic.   See today's orders.   Follow-up with primary clinic if not improving.  Advised about symptoms which might herald more serious problems.

## 2017-10-25 NOTE — MR AVS SNAPSHOT
After Visit Summary   10/25/2017    Shannan Magdaleno    MRN: [id number]           Patient Information     Date Of Birth          12/2/1931        Visit Information        Provider Department      10/25/2017 3:00 PM Sha Tolentino PA [facility]        Today's Diagnoses     Open wound    -  1      Care Instructions    CONTINUE THE DAILY BACTROBAN DRESSINGS AND IF REDNESS SPREADS OUTSIDE OUTLINED REGION , START THE CEPHALEXIN.      Wound Care  Taking proper care of your wound will help it heal. Your healthcare provider may show you how to clean and dress the wound. He or she will also explain how to tell if the wound is healing normally. If you are unsure of how to take care of the wound, be sure to clarify what dressing to use and how often you should change the bandages. Here are the basic steps.     A wound that's not healing normally may be dark in color or have white streaks.   Wash your hands  Tips for washing your hands include:    Use liquid soap and lather for 2 minutes. Scrub between your fingers and under your nails.    Rinse with warm water, keeping your fingers pointing down.    Use a paper towel to dry your hands and to turn off the faucet.  Remove the used dressing  Here are suggestions for removing the dressing:    If dressing changes cause you pain, be sure to take your pain medicine as prescribed by your healthcare provider 30 minutes before dressing changes.    Set up your supplies.    Put on disposable gloves if you re dressing a wound for someone else or your wound is infected.    Loosen the tape by pulling gently toward the wound.    Gently take off the old dressing. If the dressing is stuck to the wound, moisten it with saline (if available) or clean water.    If you have a drain or tube in the wound, be careful not to pull on it.    Remove the dressing 1 layer at a time and put it in a plastic bag. Seal the bag and put it in the trash.    Remove  your gloves.  Inspect and dress the wound  Check the wound carefully:    Each time you change the dressing, check the wound carefully to be sure it s healing normally by making sure your wound appears to be pink and moist, and is free of infection.      Wash your hands again. Put on a new pair of gloves.    Clean and dress the wound as directed by your healthcare provider or nurse. Don't put anything in the wound that is not prescribed or directed by your healthcare provider. If you have a drain or tube, be careful not to pull on it. Make sure to secure the drain or tube as well.    Put all unused supplies in a clean plastic bag. Seal the bag and store it in a clean, dry area between dressing changes.     Be sure to wash your hands again.  Call your healthcare provider  Call your healthcare provider if you see any of the following signs of a problem:    Bleeding that soaks the dressing    Pink fluid weeping from the wound    Increased drainage or drainage that is yellow, yellow-green, or foul-smelling    Increased swelling or pain, or redness or swelling in the skin around the wound    A change in the color of the wound, or if streaks develop in a direction away from the wound    The area between any stitches opens up    An increase in the size of the wound    A fever of 100.4 F (38 C) or higher, or as directed by your healthcare provider    Chills, increased fatigue, or a loss of appetite      Date Last Reviewed: 7/1/2017 2000-2017 [employer]. [address] [place] [address]. All rights reserved. This information is not intended as a substitute for professional medical care. Always follow your healthcare professional's instructions.                Follow-ups after your visit        Follow-up notes from your care team     Return if symptoms worsen or fail to improve.      Your next 10 appointments already scheduled     Oct 26, 2017  9:40 AM SULLY   SHORT with ANDREW Mcghee  CNP   [facility]    [address]  [place] [address]   [phone]            Nov 01, 2017  9:15 AM CDT   Anticoagulation Visit with ABIGAIL ANTI COAG   [facility]    [address]  [place] [address]   [phone]            Nov 13, 2017 10:00 AM CST   New Visit with Jamari Stewart MD   [facility]    [address]  [place] [address]   [phone]              Who to contact     If you have questions or need follow up information about today's clinic visit or your schedule please contact [facility] directly at [phone].  Normal or non-critical lab and imaging results will be communicated to you by MyChart, letter or phone within 4 business days after the clinic has received the results. If you do not hear from us within 7 days, please contact the clinic through [employer] or phone. If you have a critical or abnormal lab result, we will notify you by phone as soon as possible.  Submit refill requests through [employer] or call your pharmacy and they will forward the refill request to us. Please allow 3 business days for your refill to be completed.          Additional Information About Your Visit        MyChart Information     [employer] gives you secure access to your electronic health record. If you see a primary care provider, you can also send messages to your care team and make appointments. If you have questions, please call your primary care clinic.  If you do not have a primary care provider, please call [phone] and they will assist you.        Care EveryWhere ID     This is your Care EveryWhere ID. This could be used by other organizations to access your [place] medical records  [id number]        Your Vitals Were     Pulse Pulse Oximetry Breastfeeding? BMI (Body Mass Index)          59 97% No 27.25 kg/m2         Blood Pressure  from Last 3 Encounters:   10/25/17 148/66   10/19/17 108/74   09/22/17 104/76    Weight from Last 3 Encounters:   10/25/17 149 lb (67.6 kg)   10/19/17 151 lb (68.5 kg)   09/22/17 146 lb 3.2 oz (66.3 kg)              Today, you had the following     No orders found for display         Today's Medication Changes          These changes are accurate as of: 10/25/17  3:26 PM.  If you have any questions, ask your nurse or doctor.               Start taking these medicines.        Dose/Directions    cephALEXin 500 MG capsule   Commonly known as:  KEFLEX   Used for:  Open wound   Started by:  Sha Tolentino PA        Dose:  500 mg   Take 1 capsule (500 mg) by mouth 3 times daily for 7 days   Quantity:  21 capsule   Refills:  0            Where to get your medicines      These medications were sent to [facility]/pharmacy #[id number] - [place], [address] OF [address], [place] [address]     Phone:  [phone]     cephALEXin 500 MG capsule                Primary Care Provider Office Phone # Fax #    Adela Morgan [phone]       [address] [place] [address]        Equal Access to Services     HENRY ZIEGLER AH: Hadii taz ku hadasho Soomaali, waaxda luqadaha, qaybta kaalmada adeegyada, waxay idiin hayjohnien coral valadez. So [facility] [phone].    ATENCIÓN: Si habla español, tiene a linn disposición servicios gratuitos de asistencia lingüística. Llame al [phone].    We comply with applicable federal civil rights laws and [place] laws. We do not discriminate on the basis of race, color, national origin, age, disability, sex, sexual orientation, or gender identity.            Thank you!     Thank you for choosing [facility]  for your care. Our goal is always to provide you with excellent care. Hearing back from our patients is one way we can continue to improve our services. Please take a few minutes to  complete the written survey that you may receive in the mail after your visit with us. Thank you!             Your Updated Medication List - Protect others around you: Learn how to safely use, store and throw away your medicines at www.disposemymeds.org.          This list is accurate as of: 10/25/17  3:26 PM.  Always use your most recent med list.                   Brand Name Dispense Instructions for use Diagnosis    calcium-vitamin D 600-400 MG-UNIT per tablet    CALTRATE     Take 1 tablet by mouth daily        CENTRUM SILVER per tablet     30 tablet    Take 1 tablet by mouth daily Has 2000 IU of Vitamin D in it.    Osteopenia       cephALEXin 500 MG capsule    KEFLEX    21 capsule    Take 1 capsule (500 mg) by mouth 3 times daily for 7 days    Open wound       diazepam 2 MG tablet    VALIUM    60 tablet    TAKE 1/2 -  1 TABLET BY MOUTH AT BEDTIME FOR ANXIETY    Anxiety       escitalopram 10 MG tablet    LEXAPRO    90 tablet    Take 1 tablet (10 mg) by mouth daily    Anxiety       metoprolol 25 MG tablet    LOPRESSOR    270 tablet    Take 1.5 tablets (37.5 mg) by mouth 2 times daily    Paroxysmal atrial fibrillation (H)       mometasone 0.1 % cream    ELOCON    15 g    Apply sparingly to affected area twice daily for 1 week.    Skin lesion of left leg       mupirocin 2 % ointment    BACTROBAN    30 g    Apply to infected wound three times a day    Local infection of wound       nitroGLYcerin 0.4 MG sublingual tablet    NITROSTAT    25 tablet    Place 1 tablet (0.4 mg) under the tongue every 5 minutes as needed for chest pain    Coronary artery disease involving native coronary artery of native heart without angina pectoris       simvastatin 40 MG tablet    ZOCOR    90 tablet    TAKE 1 TABLET (40 MG) BY MOUTH AT BEDTIME    Hyperlipidemia LDL goal <70       TYLENOL 500 MG tablet   Generic drug:  acetaminophen      Take 1-2 tablets by mouth every 6 hours as needed.        warfarin 5 MG tablet    COUMADIN    90  tablet    TAKE 1 & 1/2 TABLETS ON WED, AND 1 TAB REST OF WEEK    Paroxysmal atrial fibrillation (H)

## 2017-10-25 NOTE — PROGRESS NOTES
ANTICOAGULATION FOLLOW-UP CLINIC VISIT    Patient Name:  Shannan Magdaleno  Date:  10/25/2017  Contact Type:  Face to Face    SUBJECTIVE:     Patient Findings     Positives No Problem Findings, Unexplained INR or factor level change           OBJECTIVE    INR Protime   Date Value Ref Range Status   10/25/2017 1.7 (A) 0.86 - 1.14 Final       ASSESSMENT / PLAN  No question data found.  Anticoagulation Summary as of 10/25/2017     INR goal 2.0-3.0   Today's INR 1.7!   Maintenance plan 2.5 mg (5 mg x 0.5) on Mon, Fri; 5 mg (5 mg x 1) all other days   Full instructions 10/27: 5 mg; 10/30: 5 mg; Otherwise 2.5 mg on Mon, Fri; 5 mg all other days   Weekly total 30 mg   Plan last modified Moon Lobato RN (9/22/2017)   Next INR check 11/1/2017   Priority INR   Target end date     Indications   Paroxysmal atrial fibrillation (H) [I48.0]  Long-term (current) use of anticoagulants [Z79.01] [Z79.01]         Anticoagulation Episode Summary     INR check location     Preferred lab     Send INR reminders to [facility]    Comments       Anticoagulation Care Providers     Provider Role Specialty Phone number    Adela Morgan MD [facility] Internal Medicine [phone]            See the Encounter Report to view Anticoagulation Flowsheet and Dosing Calendar (Go to Encounters tab in chart review, and find the Anticoagulation Therapy Visit)    Dosage adjustment made based on physician directed care plan.    Moon Lobato RN

## 2017-10-25 NOTE — PATIENT INSTRUCTIONS
CONTINUE THE DAILY BACTROBAN DRESSINGS AND IF REDNESS SPREADS OUTSIDE OUTLINED REGION , START THE CEPHALEXIN.      Wound Care  Taking proper care of your wound will help it heal. Your healthcare provider may show you how to clean and dress the wound. He or she will also explain how to tell if the wound is healing normally. If you are unsure of how to take care of the wound, be sure to clarify what dressing to use and how often you should change the bandages. Here are the basic steps.     A wound that's not healing normally may be dark in color or have white streaks.   Wash your hands  Tips for washing your hands include:    Use liquid soap and lather for 2 minutes. Scrub between your fingers and under your nails.    Rinse with warm water, keeping your fingers pointing down.    Use a paper towel to dry your hands and to turn off the faucet.  Remove the used dressing  Here are suggestions for removing the dressing:    If dressing changes cause you pain, be sure to take your pain medicine as prescribed by your healthcare provider 30 minutes before dressing changes.    Set up your supplies.    Put on disposable gloves if you re dressing a wound for someone else or your wound is infected.    Loosen the tape by pulling gently toward the wound.    Gently take off the old dressing. If the dressing is stuck to the wound, moisten it with saline (if available) or clean water.    If you have a drain or tube in the wound, be careful not to pull on it.    Remove the dressing 1 layer at a time and put it in a plastic bag. Seal the bag and put it in the trash.    Remove your gloves.  Inspect and dress the wound  Check the wound carefully:    Each time you change the dressing, check the wound carefully to be sure it s healing normally by making sure your wound appears to be pink and moist, and is free of infection.      Wash your hands again. Put on a new pair of gloves.    Clean and dress the wound as directed by your healthcare  provider or nurse. Don't put anything in the wound that is not prescribed or directed by your healthcare provider. If you have a drain or tube, be careful not to pull on it. Make sure to secure the drain or tube as well.    Put all unused supplies in a clean plastic bag. Seal the bag and store it in a clean, dry area between dressing changes.     Be sure to wash your hands again.  Call your healthcare provider  Call your healthcare provider if you see any of the following signs of a problem:    Bleeding that soaks the dressing    Pink fluid weeping from the wound    Increased drainage or drainage that is yellow, yellow-green, or foul-smelling    Increased swelling or pain, or redness or swelling in the skin around the wound    A change in the color of the wound, or if streaks develop in a direction away from the wound    The area between any stitches opens up    An increase in the size of the wound    A fever of 100.4 F (38 C) or higher, or as directed by your healthcare provider    Chills, increased fatigue, or a loss of appetite      Date Last Reviewed: 7/1/2017 2000-2017 [employer]. [address] [place] [address]. All rights reserved. This information is not intended as a substitute for professional medical care. Always follow your healthcare professional's instructions.

## 2017-11-01 ENCOUNTER — ANTICOAGULATION THERAPY VISIT (OUTPATIENT)
Dept: NURSING | Facility: CLINIC | Age: 82
End: 2017-11-01
Payer: COMMERCIAL

## 2017-11-01 DIAGNOSIS — Z79.01 LONG-TERM (CURRENT) USE OF ANTICOAGULANTS: ICD-10-CM

## 2017-11-01 DIAGNOSIS — I48.0 PAROXYSMAL ATRIAL FIBRILLATION (H): ICD-10-CM

## 2017-11-01 LAB — INR POINT OF CARE: 2.5 (ref 0.86–1.14)

## 2017-11-01 PROCEDURE — 99207 ZZC NO CHARGE NURSE ONLY: CPT

## 2017-11-01 PROCEDURE — 36416 COLLJ CAPILLARY BLOOD SPEC: CPT

## 2017-11-01 PROCEDURE — 85610 PROTHROMBIN TIME: CPT | Mod: QW

## 2017-11-01 NOTE — MR AVS SNAPSHOT
Shannan TERAN Rasta   11/1/2017 9:15 AM   Anticoagulation Therapy Visit    Description:  85 year old female   Provider:  DARA ANTI COAG   Department:  [facility]           INR as of 11/1/2017     Today's INR 2.5      Anticoagulation Summary as of 11/1/2017     INR goal 2.0-3.0   Today's INR 2.5   Full instructions 5 mg every day   Next INR check 11/15/2017    Indications   Paroxysmal atrial fibrillation (H) [I48.0]  Long-term (current) use of anticoagulants [Z79.01] [Z79.01]         Your next Anticoagulation Clinic appointment(s)     Nov 15, 2017 10:00 AM CST   Anticoagulation Visit with DARA ANTI DEBRA   [facility] ([facility]    [address] [address] [phone]              Contact Numbers     [facility]  Please call [phone] to cancel and/or reschedule your appointment   Please call [phone] with any problems or questions regarding your therapy.        November 2017 Details    Sun Mon Tue Wed Thu Fri Sat        1      5 mg   See details      2      5 mg         3      5 mg         4      5 mg           5      5 mg         6      5 mg         7      5 mg         8      5 mg         9      5 mg         10      5 mg         11      5 mg           12      5 mg         13      5 mg         14      5 mg         15            16               17               18                 19               20               21               22               23               24               25                 26               27               28               29               30                  Date Details   11/01 This INR check       Date of next INR:  11/15/2017         How to take your warfarin dose     To take:  5 mg Take 1 of the 5 mg tablets.

## 2017-11-01 NOTE — PROGRESS NOTES
ANTICOAGULATION FOLLOW-UP CLINIC VISIT    Patient Name:  Shannan Magdaleno  Date:  11/1/2017  Contact Type:  Face to Face    SUBJECTIVE:     Patient Findings     Positives No Problem Findings           OBJECTIVE    INR Protime   Date Value Ref Range Status   11/01/2017 2.5 (A) 0.86 - 1.14 Final       ASSESSMENT / PLAN  No question data found.  Anticoagulation Summary as of 11/1/2017     INR goal 2.0-3.0   Today's INR 2.5   Maintenance plan 5 mg (5 mg x 1) every day   Full instructions 5 mg every day   Weekly total 35 mg   Plan last modified Moon Lobato, RN (11/1/2017)   Next INR check 11/15/2017   Priority INR   Target end date     Indications   Paroxysmal atrial fibrillation (H) [I48.0]  Long-term (current) use of anticoagulants [Z79.01] [Z79.01]         Anticoagulation Episode Summary     INR check location     Preferred lab     Send INR reminders to [facility]    Comments       Anticoagulation Care Providers     Provider Role Specialty Phone number    Adela Morgan MD [facility] Internal Medicine [phone]            See the Encounter Report to view Anticoagulation Flowsheet and Dosing Calendar (Go to Encounters tab in chart review, and find the Anticoagulation Therapy Visit)    Dosage adjustment made based on physician directed care plan.    Moon Lobato, JUAN

## 2017-11-07 ENCOUNTER — RADIANT APPOINTMENT (OUTPATIENT)
Dept: GENERAL RADIOLOGY | Facility: CLINIC | Age: 82
End: 2017-11-07
Attending: INTERNAL MEDICINE
Payer: COMMERCIAL

## 2017-11-07 ENCOUNTER — OFFICE VISIT (OUTPATIENT)
Dept: INTERNAL MEDICINE | Facility: CLINIC | Age: 82
End: 2017-11-07
Payer: COMMERCIAL

## 2017-11-07 ENCOUNTER — MYC MEDICAL ADVICE (OUTPATIENT)
Dept: INTERNAL MEDICINE | Facility: CLINIC | Age: 82
End: 2017-11-07

## 2017-11-07 VITALS
DIASTOLIC BLOOD PRESSURE: 64 MMHG | WEIGHT: 149.8 LBS | HEART RATE: 64 BPM | TEMPERATURE: 98.6 F | BODY MASS INDEX: 27.4 KG/M2 | OXYGEN SATURATION: 98 % | SYSTOLIC BLOOD PRESSURE: 138 MMHG

## 2017-11-07 DIAGNOSIS — M79.672 LEFT FOOT PAIN: ICD-10-CM

## 2017-11-07 DIAGNOSIS — I25.10 CORONARY ARTERY DISEASE INVOLVING NATIVE CORONARY ARTERY OF NATIVE HEART WITHOUT ANGINA PECTORIS: ICD-10-CM

## 2017-11-07 DIAGNOSIS — M19.90 OSTEOARTHRITIS, UNSPECIFIED OSTEOARTHRITIS TYPE, UNSPECIFIED SITE: ICD-10-CM

## 2017-11-07 DIAGNOSIS — H11.31 CONJUNCTIVAL HEMORRHAGE OF RIGHT EYE: ICD-10-CM

## 2017-11-07 DIAGNOSIS — S81.802S OPEN WOUND OF LEFT LOWER EXTREMITY, SEQUELA: Primary | ICD-10-CM

## 2017-11-07 PROCEDURE — 99214 OFFICE O/P EST MOD 30 MIN: CPT | Performed by: INTERNAL MEDICINE

## 2017-11-07 PROCEDURE — 73630 X-RAY EXAM OF FOOT: CPT | Mod: LT

## 2017-11-07 RX ORDER — NITROGLYCERIN 0.4 MG/1
0.4 TABLET SUBLINGUAL EVERY 5 MIN PRN
Qty: 25 TABLET | Refills: 3 | Status: SHIPPED | OUTPATIENT
Start: 2017-11-07

## 2017-11-07 ASSESSMENT — PAIN SCALES - GENERAL: PAINLEVEL: MODERATE PAIN (5)

## 2017-11-07 NOTE — PROGRESS NOTES
INTERNAL MEDICINE  SUBJECTIVE:   Shannan Magdaleno is a 85 year old female who presents to clinic today for the following health issues:    Patient presents to clinic today for possible left leg infection    Leg wound - Patient feels sore around her biopsy site, which was done 1 month ago. Did not have a good experience at [facility] due to lack of doctors. She was given Keflex for her wound and told to take it if redness increased. She has been putting Basitracin on it.    Foot pain - Her left foot has been painful and she woke up with foot pain. She has been limping. Does not recall recent trauma. She did fall once and landed on her foot. Prior to this episode, her foot was painful when she touched it.     She has had a red eye for a couple days.  No pain.        Problem list and histories reviewed & adjusted, as indicated.  Additional history: as documented    Labs reviewed in EPIC    Reviewed and updated as needed this visit by clinical staff  Tobacco  Allergies  Meds       Reviewed and updated as needed this visit by Provider         ROS:  C: NEGATIVE for fever, chills, change in weight  I: NEGATIVE for worrisome rashes, moles or lesions  M: NEGATIVE for significant arthralgias or myalgia  N: NEGATIVE for weakness, dizziness or paresthesias  H: NEGATIVE for bleeding problems  P: NEGATIVE for changes in mood or affect    This document serves as a record of the services and decisions personally performed and made by Adela Morgan MD. It was created on his/her behalf by Andrew Gipson trained medical scribe. The creation of this document is based the provider's statements to the medical scribes.    Aron Gipson 1:46 PM, November 7, 2017    OBJECTIVE:   /64  Pulse 64  Temp 98.6  F (37  C) (Oral)  Wt 67.9 kg (149 lb 12.8 oz)  SpO2 98%  BMI 27.4 kg/m2  Body mass index is 27.4 kg/(m^2).  GENERAL: healthy, alert and no distress  Right conjunctival redness  MS: left dorsal foot with hypertrophy  over midfoot, pain to palpation over fourth MTP.   SKIN: ulcerated lesion with good granulated tissue, minimal surround erythema    NEURO: Normal strength and tone, mentation intact and speech normal  PSYCH: mentation appears normal, affect normal/bright    Diagnostic Test Results:  Results for orders placed or performed in visit on 11/07/17   XR Foot Left G/E 3 Views    Narrative    XR FOOT LT G/E 3 VW 11/7/2017 2:19 PM    HISTORY: Left foot pain.    COMPARISON: None.    FINDINGS: No acute fracture or malalignment. Degenerative change at  the first MTP joint characterized by complete loss of joint space.  Moderate degenerative change in the midfoot with associated  subchondral sclerosis and prominent marginal osteophytes. Scattered  degenerative changes in the toes.      Impression    IMPRESSION: Degenerative change without acute abnormality.    PAT CASTANON MD       ASSESSMENT/PLAN:   1. Coronary artery disease involving native coronary artery of native heart without angina pectoris  Stable. The current medical regimen is effective;  continue present plan and medications.  - nitroGLYcerin (NITROSTAT) 0.4 MG sublingual tablet; Place 1 tablet (0.4 mg) under the tongue every 5 minutes as needed for chest pain  Dispense: 25 tablet; Refill: 3    2. Conjunctival hemorrhage of right eye  Asymptomatic.  No treatment.     3. Left foot pain  Suspect osteoarthritis.  Xray to rule out fracture.  - XR Foot Left G/E 3 Views  - diclofenac (VOLTAREN) 1 % GEL topical gel; Apply 4 grams to knees or 2 grams to hands four times daily as needed using enclosed dosing card.  Dispense: 100 g; Refill: 1    4. Osteoarthritis, unspecified osteoarthritis type, unspecified site  Stable. The current medical regimen is effective;  continue present plan and medications.  - diclofenac (VOLTAREN) 1 % GEL topical gel; Apply 4 grams to knees or 2 grams to hands four times daily as needed using enclosed dosing card.  Dispense: 100 g; Refill:  1    Open wound - healing without infection.  Continue moist dressing.    Patient Instructions     Put Vaseline on the wound and keep covered.  Voltaren gel 4 times daily as needed for foot pain.    [facility]    If you have any questions regarding to your visit please contact your care team:     Team Pink:   Clinic Hours Telephone Number   Internal Medicine:  Dr. Adela Yin, NP       7am-7pm  Monday - Thursday   7am-5pm  Fridays  [phone]  (Appointment scheduling available 24/7)    Questions about your visit?  Team Line  [phone]   [facility] [place] [facility] 11am-9pm Monday-Friday Saturday-Sunday- 9am-5pm   [place] - 5pm-9pm Monday-Friday Saturday-Sunday- 9am-5pm  [phone] - [place]   [phone] - [place]       What options do I have for visits at the clinic other than the traditional office visit?  To expand how we care for you, many of our providers are utilizing electronic visits (e-visits) and telephone visits, when medically appropriate, for interactions with their patients rather than a visit in the clinic.   We also offer nurse visits for many medical concerns. Just like any other service, we will bill your insurance company for this type of visit based on time spent on the phone with your provider. Not all insurance companies cover these visits. Please check with your medical insurance if this type of visit is covered. You will be responsible for any charges that are not paid by your insurance.      E-visits via [employer]:  generally incur a $35.00 fee.  Telephone visits:  Time spent on the phone: *charged based on time that is spent on the phone in increments of 10 minutes. Estimated cost:   5-10 mins $30.00   11-20 mins. $59.00   21-30 mins. $85.00   Use [employer] (secure email communication and access to your chart) to send your primary care provider a message or make an appointment. Ask someone on your  Team how to sign up for [employer].    For a Price Quote for your services, please call our Consumer Price Line at [phone].    As always, Thank you for trusting us with your health care needs!    Chiara Mercer CMA        The information in this document, created by a scribe for me, accurately reflects the services I personally performed and the decisions made by me. I have reviewed and approved this document for accuracy.     I spent 15 minutes of time with the patient and >50% of it was in education and counseling regarding leg wound and foot pain.    Adela Morgan MD  [facility]    Start 1:41 PM  End 1:56 PM

## 2017-11-07 NOTE — MR AVS SNAPSHOT
After Visit Summary   11/7/2017    Shannan Magdaleno    MRN: [id number]           Patient Information     Date Of Birth          12/2/1931        Visit Information        Provider Department      11/7/2017 1:30 PM Adela Morgan MD [facility]        Today's Diagnoses     Conjunctival hemorrhage of right eye    -  1    Coronary artery disease involving native coronary artery of native heart without angina pectoris        Left foot pain        Osteoarthritis, unspecified osteoarthritis type, unspecified site          Care Instructions    Put Vaseline on the wound and keep covered.  Voltaren gel 4 times daily as needed for foot pain.    [facility]    If you have any questions regarding to your visit please contact your care team:     Team Pink:   Clinic Hours Telephone Number   Internal Medicine:  Dr. Adela Yin NP       7am-7pm  Monday - Thursday   7am-5pm  Fridays  [phone]  (Appointment scheduling available 24/7)    Questions about your visit?  Team Line  [phone]   Ur[place] - [place] - 11am-9pm Monday-Friday Saturday-Sunday- 9am-5pm   [place] - 5pm-9pm Monday-Friday Saturday-Sunday- 9am-5pm  [phone] - [place]   [phone] - [place]       What options do I have for visits at the clinic other than the traditional office visit?  To expand how we care for you, many of our providers are utilizing electronic visits (e-visits) and telephone visits, when medically appropriate, for interactions with their patients rather than a visit in the clinic.   We also offer nurse visits for many medical concerns. Just like any other service, we will bill your insurance company for this type of visit based on time spent on the phone with your provider. Not all insurance companies cover these visits. Please check with your medical insurance if this type of visit is covered. You will be responsible  for any charges that are not paid by your insurance.      E-visits via [employer]:  generally incur a $35.00 fee.  Telephone visits:  Time spent on the phone: *charged based on time that is spent on the phone in increments of 10 minutes. Estimated cost:   5-10 mins $30.00   11-20 mins. $59.00   21-30 mins. $85.00   Use [employer] (secure email communication and access to your chart) to send your primary care provider a message or make an appointment. Ask someone on your Team how to sign up for Action.    For a Price Quote for your services, please call our ShareDesk Line at [phone].    As always, Thank you for trusting us with your health care needs!    Chiara Mercer CMA            Follow-ups after your visit        Your next 10 appointments already scheduled     Nov 13, 2017 10:00 AM CST   New Visit with Jamari Stewart MD   [facility] ([facility]    [address] [place] [id number] [phone]            Nov 15, 2017 10:00 AM CST   Anticoagulation Visit with ABIGAIL ANTI COAG   [facility] ([facility])    [address] [place] [id number] [phone]              Who to contact     If you have questions or need follow up information about today's clinic visit or your schedule please contact [facility] directly at [phone].  Normal or non-critical lab and imaging results will be communicated to you by MyChart, letter or phone within 4 business days after the clinic has received the results. If you do not hear from us within 7 days, please contact the clinic through MyChart or phone. If you have a critical or abnormal lab result, we will notify you by phone as soon as possible.  Submit refill requests through Action or call your pharmacy and they will forward the refill request to us. Please allow 3 business days for your refill to be completed.          Additional Information About Your Visit        [employer]  Information     [employer] gives you secure access to your electronic health record. If you see a primary care provider, you can also send messages to your care team and make appointments. If you have questions, please call your primary care clinic.  If you do not have a primary care provider, please call [phone] and they will assist you.        Care EveryWhere ID     This is your Care EveryWhere ID. This could be used by other organizations to access your [place] medical records  [id number]        Your Vitals Were     Pulse Temperature Pulse Oximetry BMI (Body Mass Index)          64 98.6  F (37  C) (Oral) 98% 27.4 kg/m2         Blood Pressure from Last 3 Encounters:   11/07/17 138/64   10/25/17 148/66   10/19/17 108/74    Weight from Last 3 Encounters:   11/07/17 149 lb 12.8 oz (67.9 kg)   10/25/17 149 lb (67.6 kg)   10/19/17 151 lb (68.5 kg)              We Performed the Following     XR Foot Left G/E 3 Views          Today's Medication Changes          These changes are accurate as of: 11/7/17  1:55 PM.  If you have any questions, ask your nurse or doctor.               Start taking these medicines.        Dose/Directions    diclofenac 1 % Gel topical gel   Commonly known as:  VOLTAREN   Used for:  Left foot pain, Osteoarthritis, unspecified osteoarthritis type, unspecified site   Started by:  Adela Morgan MD        Apply 4 grams to knees or 2 grams to hands four times daily as needed using enclosed dosing card.   Quantity:  100 g   Refills:  1            Where to get your medicines      These medications were sent to [facility]/pharmacy #[id number] - [place], [place] - [id number], [place] [address]     Phone:  [phone]     diclofenac 1 % Gel topical gel    nitroGLYcerin 0.4 MG sublingual tablet                Primary Care Provider Office Phone # Fax #    Adela Morgan [phone] [address] [place] [address]         Equal Access to Services     [facility]: Hadii taz choudhary alonzo Silva, waaxda luqadaha, qaybta kaavaniclaudia villalpando. So [facility] [phone].    ATENCIÓN: Si habla cari, tiene a linn disposición servicios gratuitos de asistencia lingüística. Deanneame al [phone].    We comply with applicable federal civil rights laws and [place] laws. We do not discriminate on the basis of race, color, national origin, age, disability, sex, sexual orientation, or gender identity.            Thank you!     Thank you for choosing [facility]  for your care. Our goal is always to provide you with excellent care. Hearing back from our patients is one way we can continue to improve our services. Please take a few minutes to complete the written survey that you may receive in the mail after your visit with us. Thank you!             Your Updated Medication List - Protect others around you: Learn how to safely use, store and throw away your medicines at www.disposemymeds.org.          This list is accurate as of: 11/7/17  1:55 PM.  Always use your most recent med list.                   Brand Name Dispense Instructions for use Diagnosis    ASPIRIN NOT PRESCRIBED    INTENTIONAL    0 each    Please choose reason not prescribed, below    Paroxysmal atrial fibrillation (H)       calcium-vitamin D 600-400 MG-UNIT per tablet    CALTRATE     Take 1 tablet by mouth daily        CENTRUM SILVER per tablet     30 tablet    Take 1 tablet by mouth daily Has 2000 IU of Vitamin D in it.    Osteopenia       diazepam 2 MG tablet    VALIUM    60 tablet    TAKE 1/2 -  1 TABLET BY MOUTH AT BEDTIME FOR ANXIETY    Anxiety       diclofenac 1 % Gel topical gel    VOLTAREN    100 g    Apply 4 grams to knees or 2 grams to hands four times daily as needed using enclosed dosing card.    Left foot pain, Osteoarthritis, unspecified osteoarthritis type, unspecified site       escitalopram 10 MG tablet     LEXAPRO    90 tablet    Take 1 tablet (10 mg) by mouth daily    Anxiety       metoprolol 25 MG tablet    LOPRESSOR    270 tablet    Take 1.5 tablets (37.5 mg) by mouth 2 times daily    Paroxysmal atrial fibrillation (H)       mometasone 0.1 % cream    ELOCON    15 g    Apply sparingly to affected area twice daily for 1 week.    Skin lesion of left leg       mupirocin 2 % ointment    BACTROBAN    30 g    Apply to infected wound three times a day    Local infection of wound       nitroGLYcerin 0.4 MG sublingual tablet    NITROSTAT    25 tablet    Place 1 tablet (0.4 mg) under the tongue every 5 minutes as needed for chest pain    Coronary artery disease involving native coronary artery of native heart without angina pectoris       simvastatin 40 MG tablet    ZOCOR    90 tablet    TAKE 1 TABLET (40 MG) BY MOUTH AT BEDTIME    Hyperlipidemia LDL goal <70       TYLENOL 500 MG tablet   Generic drug:  acetaminophen      Take 1-2 tablets by mouth every 6 hours as needed.        warfarin 5 MG tablet    COUMADIN    90 tablet    TAKE 1 & 1/2 TABLETS ON WED, AND 1 TAB REST OF WEEK    Paroxysmal atrial fibrillation (H)

## 2017-11-07 NOTE — PATIENT INSTRUCTIONS
Put Vaseline on the wound and keep covered.  Voltaren gel 4 times daily as needed for foot pain.    [facility]    If you have any questions regarding to your visit please contact your care team:     Team Pink:   Clinic Hours Telephone Number   Internal Medicine:  Dr. Adela Yin NP       7am-7pm  Monday - Thursday   7am-5pm  Fridays  [phone]  (Appointment scheduling available 24/7)    Questions about your visit?  Team Line  [phone]   Ur[facility] - [facility] - 11am-9pm Monday-Friday Saturday-Sunday- 9am-5pm   [place] - 5pm-9pm Monday-Friday Saturday-Sunday- 9am-5pm  [phone] - [facility]  [phone] - [place]       What options do I have for visits at the clinic other than the traditional office visit?  To expand how we care for you, many of our providers are utilizing electronic visits (e-visits) and telephone visits, when medically appropriate, for interactions with their patients rather than a visit in the clinic.   We also offer nurse visits for many medical concerns. Just like any other service, we will bill your insurance company for this type of visit based on time spent on the phone with your provider. Not all insurance companies cover these visits. Please check with your medical insurance if this type of visit is covered. You will be responsible for any charges that are not paid by your insurance.      E-visits via [employer]:  generally incur a $35.00 fee.  Telephone visits:  Time spent on the phone: *charged based on time that is spent on the phone in increments of 10 minutes. Estimated cost:   5-10 mins $30.00   11-20 mins. $59.00   21-30 mins. $85.00   Use [employer] (secure email communication and access to your chart) to send your primary care provider a message or make an appointment. Ask someone on your Team how to sign up for [employer].    For a Price Quote for your services, please call our Consumer  [employer] Line at [phone].    As always, Thank you for trusting us with your health care needs!    Chiara Mercer, CMA

## 2017-11-07 NOTE — TELEPHONE ENCOUNTER
Opening on Dr. Morgan's schedule at 1:30PM today      Called [facility].  Scheduled patient for an appointment with DR. Morgan today at 1:30PM    Tania Hall RN

## 2017-11-13 ENCOUNTER — OFFICE VISIT (OUTPATIENT)
Dept: OPHTHALMOLOGY | Facility: CLINIC | Age: 82
End: 2017-11-13
Payer: COMMERCIAL

## 2017-11-13 DIAGNOSIS — H25.813 COMBINED FORMS OF AGE-RELATED CATARACT OF BOTH EYES: ICD-10-CM

## 2017-11-13 DIAGNOSIS — H43.811 POSTERIOR VITREOUS DETACHMENT, RIGHT: ICD-10-CM

## 2017-11-13 DIAGNOSIS — Z01.01 ENCOUNTER FOR EXAMINATION OF EYES AND VISION WITH ABNORMAL FINDINGS: Primary | ICD-10-CM

## 2017-11-13 DIAGNOSIS — H52.4 PRESBYOPIA: ICD-10-CM

## 2017-11-13 PROCEDURE — 92014 COMPRE OPH EXAM EST PT 1/>: CPT | Performed by: OPHTHALMOLOGY

## 2017-11-13 PROCEDURE — 92015 DETERMINE REFRACTIVE STATE: CPT | Performed by: OPHTHALMOLOGY

## 2017-11-13 ASSESSMENT — TONOMETRY
IOP_METHOD: APPLANATION
OD_IOP_MMHG: 13
OS_IOP_MMHG: 14

## 2017-11-13 ASSESSMENT — REFRACTION_WEARINGRX
OD_SPHERE: +2.50
OD_ADD: +3.00
OD_AXIS: 009
OS_AXIS: 170
OD_CYLINDER: +0.75
SPECS_TYPE: BIFOCAL
OS_CYLINDER: +0.25
OS_SPHERE: +1.50
OS_ADD: +3.00

## 2017-11-13 ASSESSMENT — EXTERNAL EXAM - LEFT EYE: OS_EXAM: PROLAPSED FAT PADS: UPPER, LOWER

## 2017-11-13 ASSESSMENT — VISUAL ACUITY
OD_CC: 20/25
OS_CC: 20/25+2
OD_CC: J1
METHOD: SNELLEN - LINEAR
OS_CC: J1
CORRECTION_TYPE: GLASSES

## 2017-11-13 ASSESSMENT — REFRACTION_MANIFEST
OS_ADD: +3.00
OS_CYLINDER: +0.75
OD_SPHERE: +1.00
OD_CYLINDER: +0.75
OS_SPHERE: +1.50
OS_AXIS: 098
OD_AXIS: 180
OD_ADD: +3.00

## 2017-11-13 ASSESSMENT — SLIT LAMP EXAM - LIDS: COMMENTS: 1+ DERMATOCHALASIS - UPPER LID, 1+ SCLERAL SHOW

## 2017-11-13 ASSESSMENT — CUP TO DISC RATIO
OD_RATIO: 0.6
OS_RATIO: 0.6

## 2017-11-13 ASSESSMENT — CONF VISUAL FIELD
OD_NORMAL: 1
OS_NORMAL: 1

## 2017-11-13 ASSESSMENT — EXTERNAL EXAM - RIGHT EYE: OD_EXAM: PROLAPSED FAT PADS: UPPER, LOWER

## 2017-11-13 NOTE — MR AVS SNAPSHOT
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 Left eye  Glasses Rx given - optional   Call in July 2018 for an appointment in November 2018 for Complete Exam    Dr. Stewart [phone]           Follow-ups after your visit        Your next 10 appointments already scheduled     Nov 15, 2017 10:00 AM CST   Anticoagulation Visit with FZ ANTI COAG   [facility] [place] ([facility]    [address] [place] [address]  [place] [address] [phone]              Who to contact     If you have questions or need follow up information about today's clinic visit or your schedule please contact [facility] [place] directly at [phone].  Normal or non-critical lab and imaging results will be communicated to you by MyChart, letter or phone within 4 business days after the clinic has received the results. If you do not hear from us within 7 days, please contact the clinic through MyChart or phone. If you have a critical or abnormal lab result, we will notify you by phone as soon as possible.  Submit refill requests through [employer] or call your pharmacy and they will forward the refill request to us. Please allow 3 business days for your refill to be completed.          Additional Information About Your Visit        [employer] Information     [employer] gives you secure access to your electronic health  record. If you see a primary care provider, you can also send messages to your care team and make appointments. If you have questions, please call your primary care clinic.  If you do not have a primary care provider, please call [phone] and they will assist you.        Care EveryWhere ID     This is your Care EveryWhere ID. This could be used by other organizations to access your [place] medical records  [id number]         Blood Pressure from Last 3 Encounters:   11/07/17 138/64   10/25/17 148/66   10/19/17 108/74    Weight from Last 3 Encounters:   11/07/17 67.9 kg (149 lb 12.8 oz)   10/25/17 67.6 kg (149 lb)   10/19/17 68.5 kg (151 lb)              We Performed the Following     EYE EXAM (SIMPLE-NONBILLABLE)     REFRACTIVE STATUS        Primary Care Provider Office Phone # Fax #    Adela Morgan [phone] [address] [facility] [address]        Equal Access to Services     [facility] Hospital[facility] : Hadii aad ku hadasho Soomaali, waaxda luqadaha, qaybta kaalmada adeegyada, waxay idiin hayjohnien coral valle . So [facility] [phone].    ATENCIÓN: Si habla español, tiene a linn disposición servicios gratuitos de asistencia lingüística. Llame al [phone].    We comply with applicable federal civil rights laws and [place] laws. We do not discriminate on the basis of race, color, national origin, age, disability, sex, sexual orientation, or gender identity.            Thank you!     Thank you for choosing [facility]  for your care. Our goal is always to provide you with excellent care. Hearing back from our patients is one way we can continue to improve our services. Please take a few minutes to complete the written survey that you may receive in the mail after your visit with us. Thank you!             Your Updated Medication List - Protect others around you: Learn how to safely use, store and throw away your medicines at www.disposemymeds.org.          This list  is accurate as of: 11/13/17 10:58 AM.  Always use your most recent med list.                   Brand Name Dispense Instructions for use Diagnosis    ASPIRIN NOT PRESCRIBED    INTENTIONAL    0 each    Please choose reason not prescribed, below    Paroxysmal atrial fibrillation (H)       calcium-vitamin D 600-400 MG-UNIT per tablet    CALTRATE     Take 1 tablet by mouth daily        CENTRUM SILVER per tablet     30 tablet    Take 1 tablet by mouth daily Has 2000 IU of Vitamin D in it.    Osteopenia       diazepam 2 MG tablet    VALIUM    60 tablet    TAKE 1/2 -  1 TABLET BY MOUTH AT BEDTIME FOR ANXIETY    Anxiety       diclofenac 1 % Gel topical gel    VOLTAREN    100 g    Apply 4 grams to knees or 2 grams to hands four times daily as needed using enclosed dosing card.    Left foot pain, Osteoarthritis, unspecified osteoarthritis type, unspecified site       escitalopram 10 MG tablet    LEXAPRO    90 tablet    Take 1 tablet (10 mg) by mouth daily    Anxiety       metoprolol 25 MG tablet    LOPRESSOR    270 tablet    Take 1.5 tablets (37.5 mg) by mouth 2 times daily    Paroxysmal atrial fibrillation (H)       mometasone 0.1 % cream    ELOCON    15 g    Apply sparingly to affected area twice daily for 1 week.    Skin lesion of left leg       mupirocin 2 % ointment    BACTROBAN    30 g    Apply to infected wound three times a day    Local infection of wound       nitroGLYcerin 0.4 MG sublingual tablet    NITROSTAT    25 tablet    Place 1 tablet (0.4 mg) under the tongue every 5 minutes as needed for chest pain    Coronary artery disease involving native coronary artery of native heart without angina pectoris       simvastatin 40 MG tablet    ZOCOR    90 tablet    TAKE 1 TABLET (40 MG) BY MOUTH AT BEDTIME    Hyperlipidemia LDL goal <70       TYLENOL 500 MG tablet   Generic drug:  acetaminophen      Take 1-2 tablets by mouth every 6 hours as needed.        warfarin 5 MG tablet    COUMADIN    90 tablet    TAKE 1 & 1/2  TABLETS ON WED, AND 1 TAB REST OF WEEK    Paroxysmal atrial fibrillation (H)

## 2017-11-13 NOTE — PROGRESS NOTES
Current Eye Medications:  none     Subjective:  Patient presents for a Comprehensive eye exam. Says she had a subconjunctival hemorrhage  2 weeks ago OD x 2 days.       Objective:  See Ophthalmology Exam.       Assessment:  Stable eye exam.      ICD-10-CM    1. Encounter for examination of eyes and vision with abnormal findings Z01.01 REFRACTIVE STATUS   2. Presbyopia H52.4 REFRACTIVE STATUS   3. 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 Posterior vitreous detachment, right H43.811         Plan:  Possible posterior vitreous detachment (sudden onset large floater and/or flashing lights) discussed. Left eye  Glasses Rx given - optional   Call in July 2018 for an appointment in November 2018 for Complete Exam    Dr. Stewart [phone]

## 2017-11-13 NOTE — PATIENT INSTRUCTIONS
Possible posterior vitreous detachment (sudden onset large floater and/or flashing lights) discussed. Left eye  Glasses Rx given - optional   Call in July 2018 for an appointment in November 2018 for Complete Exam    Dr. Stewart [phone]

## 2017-11-15 ENCOUNTER — ANTICOAGULATION THERAPY VISIT (OUTPATIENT)
Dept: NURSING | Facility: CLINIC | Age: 82
End: 2017-11-15
Payer: COMMERCIAL

## 2017-11-15 DIAGNOSIS — I48.0 PAROXYSMAL ATRIAL FIBRILLATION (H): ICD-10-CM

## 2017-11-15 DIAGNOSIS — Z79.01 LONG-TERM (CURRENT) USE OF ANTICOAGULANTS: ICD-10-CM

## 2017-11-15 LAB — INR POINT OF CARE: 2.5 (ref 0.86–1.14)

## 2017-11-15 PROCEDURE — 99207 ZZC NO CHARGE NURSE ONLY: CPT

## 2017-11-15 PROCEDURE — 85610 PROTHROMBIN TIME: CPT | Mod: QW

## 2017-11-15 PROCEDURE — 36416 COLLJ CAPILLARY BLOOD SPEC: CPT

## 2017-11-15 NOTE — PROGRESS NOTES
ANTICOAGULATION FOLLOW-UP CLINIC VISIT    Patient Name:  Shannan Magdaleno  Date:  11/15/2017  Contact Type:  Face to Face    SUBJECTIVE:     Patient Findings     Positives No Problem Findings           OBJECTIVE    INR Protime   Date Value Ref Range Status   11/15/2017 2.5 (A) 0.86 - 1.14 Final       ASSESSMENT / PLAN  No question data found.  Anticoagulation Summary as of 11/15/2017     INR goal 2.0-3.0   Today's INR 2.5   Maintenance plan 5 mg (5 mg x 1) every day   Full instructions 5 mg every day   Weekly total 35 mg   No change documented Moon Lobato RN   Plan last modified Moon Lobato RN (11/1/2017)   Next INR check 12/13/2017   Priority INR   Target end date     Indications   Paroxysmal atrial fibrillation (H) [I48.0]  Long-term (current) use of anticoagulants [Z79.01] [Z79.01]         Anticoagulation Episode Summary     INR check location     Preferred lab     Send INR reminders to [facility] CLINIC    Comments       Anticoagulation Care Providers     Provider Role Specialty Phone number    Adela Morgan MD [facility] Internal Medicine [phone]            See the Encounter Report to view Anticoagulation Flowsheet and Dosing Calendar (Go to Encounters tab in chart review, and find the Anticoagulation Therapy Visit)    Dosage adjustment made based on physician directed care plan.    Moon Lobato RN

## 2017-11-15 NOTE — MR AVS SNAPSHOT
Shannan TERAN Rasta   11/15/2017 10:00 AM   Anticoagulation Therapy Visit    Description:  85 year old female   Provider:  DARA ANTI COAG   Department:  Dara Nurse           INR as of 11/15/2017     Today's INR 2.5      Anticoagulation Summary as of 11/15/2017     INR goal 2.0-3.0   Today's INR 2.5   Full instructions 5 mg every day   Next INR check 12/13/2017    Indications   Paroxysmal atrial fibrillation (H) [I48.0]  Long-term (current) use of anticoagulants [Z79.01] [Z79.01]         Your next Anticoagulation Clinic appointment(s)     Dec 13, 2017  9:15 AM CST   Anticoagulation Visit with DARA ANTI COAG   [facility] ([facility]    [address] [facility] [address] [facility] [address] [phone]              Contact Numbers     [facility]  Please call [phone] to cancel and/or reschedule your appointment   Please call [phone] with any problems or questions regarding your therapy.        November 2017 Details    Sun Mon Tue Wed Thu Fri Sat        1               2               3               4                 5               6               7               8               9               10               11                 12               13               14               15      5 mg   See details      16      5 mg         17      5 mg         18      5 mg           19      5 mg         20      5 mg         21      5 mg         22      5 mg         23      5 mg         24      5 mg         25      5 mg           26      5 mg         27      5 mg         28      5 mg         29      5 mg         30      5 mg            Date Details   11/15 This INR check               How to take your warfarin dose     To take:  5 mg Take 1 of the 5 mg tablets.           December 2017 Details    Sun Mon Tue Wed u Fri Sat          1      5 mg         2      5 mg           3      5 mg         4      5 mg         5      5 mg         6      5 mg         7      5 mg         8      5 mg         9       5 mg           10      5 mg         11      5 mg         12      5 mg         13            14               15               16                 17               18               19               20               21               22               23                 24               25               26               27               28               29               30                 31                      Date Details   No additional details    Date of next INR:  12/13/2017         How to take your warfarin dose     To take:  5 mg Take 1 of the 5 mg tablets.

## 2017-12-13 ENCOUNTER — ANTICOAGULATION THERAPY VISIT (OUTPATIENT)
Dept: NURSING | Facility: CLINIC | Age: 82
End: 2017-12-13
Payer: COMMERCIAL

## 2017-12-13 DIAGNOSIS — I48.0 PAROXYSMAL ATRIAL FIBRILLATION (H): ICD-10-CM

## 2017-12-13 DIAGNOSIS — Z79.01 LONG-TERM (CURRENT) USE OF ANTICOAGULANTS: ICD-10-CM

## 2017-12-13 LAB — INR POINT OF CARE: 3.3 (ref 0.86–1.14)

## 2017-12-13 PROCEDURE — 85610 PROTHROMBIN TIME: CPT | Mod: QW

## 2017-12-13 PROCEDURE — 99207 ZZC NO CHARGE NURSE ONLY: CPT

## 2017-12-13 PROCEDURE — 36416 COLLJ CAPILLARY BLOOD SPEC: CPT

## 2017-12-13 NOTE — MR AVS SNAPSHOT
Shannan TERAN Rasta   12/13/2017 9:15 AM   Anticoagulation Therapy Visit    Description:  86 year old female   Provider:  DARA ANTI COAG   Department:  [facility] Nurse           INR as of 12/13/2017     Today's INR 3.3!      Anticoagulation Summary as of 12/13/2017     INR goal 2.0-3.0   Today's INR 3.3!   Full instructions 5 mg every day   Next INR check 12/27/2017    Indications   Paroxysmal atrial fibrillation (H) [I48.0]  Long-term (current) use of anticoagulants [Z79.01] [Z79.01]         Your next Anticoagulation Clinic appointment(s)     Dec 27, 2017  9:45 AM CST   Anticoagulation Visit with DARA ANTI COAPARUL   [facility] ([facility]    [address] [facility] [address] [phone]              Contact Numbers     [facility]  Please call [phone] to cancel and/or reschedule your appointment   Please call [phone] with any problems or questions regarding your therapy.        December 2017 Details    Sun Mon Tue Wed Thu Fri Sat          1               2                 3               4               5               6               7               8               9                 10               11               12               13      5 mg   See details      14      5 mg         15      5 mg         16      5 mg           17      5 mg         18      5 mg         19      5 mg         20      5 mg         21      5 mg         22      5 mg         23      5 mg           24      5 mg         25      5 mg         26      5 mg         27            28               29               30                 31                      Date Details   12/13 This INR check       Date of next INR:  12/27/2017         How to take your warfarin dose     To take:  5 mg Take 1 of the 5 mg tablets.

## 2017-12-13 NOTE — PROGRESS NOTES
ANTICOAGULATION FOLLOW-UP CLINIC VISIT    Patient Name:  Shannan Magdaleno  Date:  12/13/2017  Contact Type:  Face to Face    SUBJECTIVE:     Patient Findings     Positives Change in diet/appetite (Patient has not been eating as many greens. She will increase her intake of greens over the next week. ), No Problem Findings           OBJECTIVE    INR Protime   Date Value Ref Range Status   12/13/2017 3.3 (A) 0.86 - 1.14 Final       ASSESSMENT / PLAN  INR assessment SUPRA    Recheck INR In: 2 WEEKS    INR Location Clinic      Anticoagulation Summary as of 12/13/2017     INR goal 2.0-3.0   Today's INR 3.3!   Maintenance plan 5 mg (5 mg x 1) every day   Full instructions 5 mg every day   Weekly total 35 mg   No change documented Moon Lobato RN   Plan last modified Moon Lobato RN (11/1/2017)   Next INR check 12/27/2017   Priority INR   Target end date     Indications   Paroxysmal atrial fibrillation (H) [I48.0]  Long-term (current) use of anticoagulants [Z79.01] [Z79.01]         Anticoagulation Episode Summary     INR check location     Preferred lab     Send INR reminders to [facility] CL[facility]    Comments       Anticoagulation Care Providers     Provider Role Specialty Phone number    Adela Morgan MD [facility] Internal Medicine [phone]            See the Encounter Report to view Anticoagulation Flowsheet and Dosing Calendar (Go to Encounters tab in chart review, and find the Anticoagulation Therapy Visit)    Dosage adjustment made based on physician directed care plan.    Moon Lobato RN

## 2017-12-23 ENCOUNTER — OFFICE VISIT (OUTPATIENT)
Dept: URGENT CARE | Facility: URGENT CARE | Age: 82
End: 2017-12-23
Payer: COMMERCIAL

## 2017-12-23 VITALS
SYSTOLIC BLOOD PRESSURE: 143 MMHG | TEMPERATURE: 98.6 F | HEART RATE: 67 BPM | WEIGHT: 146.4 LBS | RESPIRATION RATE: 20 BRPM | BODY MASS INDEX: 26.78 KG/M2 | OXYGEN SATURATION: 98 % | DIASTOLIC BLOOD PRESSURE: 78 MMHG

## 2017-12-23 DIAGNOSIS — J06.9 UPPER RESPIRATORY TRACT INFECTION, UNSPECIFIED TYPE: Primary | ICD-10-CM

## 2017-12-23 PROCEDURE — 99213 OFFICE O/P EST LOW 20 MIN: CPT | Performed by: PHYSICIAN ASSISTANT

## 2017-12-23 ASSESSMENT — ENCOUNTER SYMPTOMS
CARDIOVASCULAR NEGATIVE: 1
MUSCULOSKELETAL NEGATIVE: 1
GASTROINTESTINAL NEGATIVE: 1
EYES NEGATIVE: 1
NEUROLOGICAL NEGATIVE: 1
PSYCHIATRIC NEGATIVE: 1

## 2017-12-23 NOTE — NURSING NOTE
"Chief Complaint   Patient presents with     URI     sore throat, running nose 2 days       Initial /78 (BP Location: Left arm, Patient Position: Chair, Cuff Size: Adult Regular)  Pulse 67  Temp 98.6  F (37  C) (Oral)  Resp 20  Wt 146 lb 6.4 oz (66.4 kg)  SpO2 98%  Breastfeeding? No  BMI 26.78 kg/m2 Estimated body mass index is 26.78 kg/(m^2) as calculated from the following:    Height as of 10/19/17: 5' 2\" (1.575 m).    Weight as of this encounter: 146 lb 6.4 oz (66.4 kg).  Medication Reconciliation: complete     nAgelia Gomez MA    "

## 2017-12-23 NOTE — MR AVS SNAPSHOT
After Visit Summary   12/23/2017    Shannan Magdaleno    MRN: [id number]           Patient Information     Date Of Birth          12/2/1931        Visit Information        Provider Department      12/23/2017 12:20 PM Gina Andres PA-C [facility]        Today's Diagnoses     Upper respiratory tract infection, unspecified type    -  1       Follow-ups after your visit        Your next 10 appointments already scheduled     Dec 27, 2017  9:45 AM CST   [facility] with FZ ANTI COAG   [facility]    [address]  [place] [address] [phone]              Who to contact     If you have questions or need follow up information about today's clinic visit or your schedule please contact [facility] directly at [phone].  Normal or non-critical lab and imaging results will be communicated to you by MyChart, letter or phone within 4 business days after the clinic has received the results. If you do not hear from us within 7 days, please contact the clinic through MyChart or phone. If you have a critical or abnormal lab result, we will notify you by phone as soon as possible.  Submit refill requests through [employer] or call your pharmacy and they will forward the refill request to us. Please allow 3 business days for your refill to be completed.          Additional Information About Your Visit        MyChart Information     [employer] gives you secure access to your electronic health record. If you see a primary care provider, you can also send messages to your care team and make appointments. If you have questions, please call your primary care clinic.  If you do not have a primary care provider, please call [phone] and they will assist you.        Care EveryWhere ID     This is your Care EveryWhere ID. This could be used by other organizations to access your [facility]  records  [id number]        Your Vitals Were     Pulse Temperature Respirations Pulse Oximetry Breastfeeding? BMI (Body Mass Index)    67 98.6  F (37  C) (Oral) 20 98% No 26.78 kg/m2       Blood Pressure from Last 3 Encounters:   12/23/17 143/78   11/07/17 138/64   10/25/17 148/66    Weight from Last 3 Encounters:   12/23/17 146 lb 6.4 oz (66.4 kg)   11/07/17 149 lb 12.8 oz (67.9 kg)   10/25/17 149 lb (67.6 kg)              Today, you had the following     No orders found for display       Primary Care Provider Office Phone # Fax #    Adela Morgan [phone]       [address]  [place] [address]        Equal Access to Services     [facility] Hospital[facility] : Hadii taz choudhary hadasho Soomaali, waaxda luqadaha, qaybta kaalmada adeegyada, claudia valle . So [facility] [phone].    ATENCIÓN: Si habla español, tiene a linn disposición servicios gratuitos de asistencia lingüística. [place] [phone].    We comply with applicable federal civil rights laws and [place] laws. We do not discriminate on the basis of race, color, national origin, age, disability, sex, sexual orientation, or gender identity.            Thank you!     Thank you for choosing [facility]  for your care. Our goal is always to provide you with excellent care. Hearing back from our patients is one way we can continue to improve our services. Please take a few minutes to complete the written survey that you may receive in the mail after your visit with us. Thank you!             Your Updated Medication List - Protect others around you: Learn how to safely use, store and throw away your medicines at www.disposemymeds.org.          This list is accurate as of: 12/23/17  1:38 PM.  Always use your most recent med list.                   Brand Name Dispense Instructions for use Diagnosis    ASPIRIN NOT PRESCRIBED    INTENTIONAL    0 each    Please choose reason not prescribed, below    Paroxysmal  atrial fibrillation (H)       calcium-vitamin D 600-400 MG-UNIT per tablet    CALTRATE     Take 1 tablet by mouth daily        CENTRUM SILVER per tablet     30 tablet    Take 1 tablet by mouth daily Has 2000 IU of Vitamin D in it.    Osteopenia       diazepam 2 MG tablet    VALIUM    60 tablet    TAKE 1/2 -  1 TABLET BY MOUTH AT BEDTIME FOR ANXIETY    Anxiety       diclofenac 1 % Gel topical gel    VOLTAREN    100 g    Apply 4 grams to knees or 2 grams to hands four times daily as needed using enclosed dosing card.    Left foot pain, Osteoarthritis, unspecified osteoarthritis type, unspecified site       escitalopram 10 MG tablet    LEXAPRO    90 tablet    Take 1 tablet (10 mg) by mouth daily    Anxiety       metoprolol 25 MG tablet    LOPRESSOR    270 tablet    Take 1.5 tablets (37.5 mg) by mouth 2 times daily    Paroxysmal atrial fibrillation (H)       mometasone 0.1 % cream    ELOCON    15 g    Apply sparingly to affected area twice daily for 1 week.    Skin lesion of left leg       mupirocin 2 % ointment    BACTROBAN    30 g    Apply to infected wound three times a day    Local infection of wound       nitroGLYcerin 0.4 MG sublingual tablet    NITROSTAT    25 tablet    Place 1 tablet (0.4 mg) under the tongue every 5 minutes as needed for chest pain    Coronary artery disease involving native coronary artery of native heart without angina pectoris       simvastatin 40 MG tablet    ZOCOR    90 tablet    TAKE 1 TABLET (40 MG) BY MOUTH AT BEDTIME    Hyperlipidemia LDL goal <70       TYLENOL 500 MG tablet   Generic drug:  acetaminophen      Take 1-2 tablets by mouth every 6 hours as needed.        warfarin 5 MG tablet    COUMADIN    90 tablet    TAKE 1 & 1/2 TABLETS ON WED, AND 1 TAB REST OF WEEK    Paroxysmal atrial fibrillation (H)

## 2017-12-23 NOTE — PROGRESS NOTES
SUBJECTIVE:   Shannan Magdaleno is a 86 year old female presenting with a chief complaint of   Chief Complaint   Patient presents with     URI     sore throat, running nose 2 days       Onset of symptoms was 2 day(s) ago.  Course of illness is same.    Severity moderate  Current and Associated symptoms: runny nose, stuffy nose and horse voice a bit  Treatment measures tried include Fluids and Rest.  Predisposing factors include None.    Yesterday she had chills but no fever  It started with a runny nose  She has a slight cough  No sore throat  No shortness of breath   She is here because in March she had pneumonia and dehydration, and was admitted to the hospital    Review of Systems   Constitutional:        As in HPI   HENT:        As in HPI   Eyes: Negative.    Respiratory:        As in HPI   Cardiovascular: Negative.    Gastrointestinal: Negative.    Genitourinary: Negative.    Musculoskeletal: Negative.    Skin: Negative.    Neurological: Negative.    Psychiatric/Behavioral: Negative.          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     MI (myocardial infarction) 1998    nl stress test in 5/08     OA (osteoarthritis) of knee 4/16/2014     osteoarthritis      Osteopenia      Other and unspecified hyperlipidemia      PVC's 9/00    Event Monitor     Restless legs      Retroperitoneal lymphadenopathy 7/7/2015     Current Outpatient Prescriptions   Medication Sig Dispense Refill     nitroGLYcerin (NITROSTAT) 0.4 MG sublingual tablet Place 1 tablet (0.4 mg) under the tongue every 5 minutes as needed for chest pain 25 tablet 3     diclofenac (VOLTAREN) 1 % GEL topical gel Apply 4 grams to knees or 2 grams to hands four times daily as needed using enclosed dosing  card. 100 g 1     ASPIRIN NOT PRESCRIBED (INTENTIONAL) Please choose reason not prescribed, below 0 each 0     mupirocin (BACTROBAN) 2 % ointment Apply to infected wound three times a day 30 g 1     simvastatin (ZOCOR) 40 MG tablet TAKE 1 TABLET (40 MG) BY MOUTH AT BEDTIME 90 tablet 3     diazepam (VALIUM) 2 MG tablet TAKE 1/2 -  1 TABLET BY MOUTH AT BEDTIME FOR ANXIETY 60 tablet 1     warfarin (COUMADIN) 5 MG tablet TAKE 1 & 1/2 TABLETS ON WED, AND 1 TAB REST OF WEEK 90 tablet 3     metoprolol (LOPRESSOR) 25 MG tablet Take 1.5 tablets (37.5 mg) by mouth 2 times daily 270 tablet 1     escitalopram (LEXAPRO) 10 MG tablet Take 1 tablet (10 mg) by mouth daily 90 tablet 3     calcium-vitamin D (CALTRATE) 600-400 MG-UNIT per tablet Take 1 tablet by mouth daily       Multiple Vitamins-Minerals (CENTRUM SILVER) per tablet Take 1 tablet by mouth daily Has 2000 IU of Vitamin D in it. 30 tablet      acetaminophen (TYLENOL) 500 MG tablet Take 1-2 tablets by mouth every 6 hours as needed.       mometasone (ELOCON) 0.1 % cream Apply sparingly to affected area twice daily for 1 week. (Patient not taking: Reported on 11/7/2017) 15 g 0     Social History   Substance Use Topics     Smoking status: Never Smoker     Smokeless tobacco: Never Used     Alcohol use No       OBJECTIVE  /78 (BP Location: Left arm, Patient Position: Chair, Cuff Size: Adult Regular)  Pulse 67  Temp 98.6  F (37  C) (Oral)  Resp 20  Wt 146 lb 6.4 oz (66.4 kg)  SpO2 98%  Breastfeeding? No  BMI 26.78 kg/m2    Physical Exam   Constitutional: She is oriented to person, place, and time and well-developed, well-nourished, and in no distress.   HENT:   Head: Normocephalic and atraumatic.   Right Ear: Tympanic membrane and ear canal normal.   Left Ear: Tympanic membrane and ear canal normal.   Nose: Rhinorrhea present.   Mouth/Throat: Uvula is midline, oropharynx is clear and moist and mucous membranes are normal.   Eyes: Conjunctivae and EOM are normal.  Pupils are equal, round, and reactive to light.   Neck: Normal range of motion. Neck supple.   Cardiovascular: Normal rate, regular rhythm and normal heart sounds.    Pulmonary/Chest: Effort normal and breath sounds normal.   Neurological: She is alert and oriented to person, place, and time. Gait normal.   Skin: Skin is warm and dry.   Psychiatric: Mood and affect normal.       Labs:  No results found for this or any previous visit (from the past 24 hour(s)).        ASSESSMENT:      ICD-10-CM    1. Upper respiratory tract infection, unspecified type J06.9         Medical Decision Making:    Based on her symptoms today, she pardo not look dehydrated or look consistent with pneumonia  I would like her to watch closely for worsening cough, fever, shortness of breath    PLAN:    For the runny nose, she will try Flonase      Followup:    If not improving or if condition worsens, follow up with your Primary Care Provider    There are no Patient Instructions on file for this visit.    Gina Andres PA-C

## 2018-01-03 ENCOUNTER — ANTICOAGULATION THERAPY VISIT (OUTPATIENT)
Dept: NURSING | Facility: CLINIC | Age: 83
End: 2018-01-03
Payer: COMMERCIAL

## 2018-01-03 DIAGNOSIS — Z79.01 LONG-TERM (CURRENT) USE OF ANTICOAGULANTS: ICD-10-CM

## 2018-01-03 DIAGNOSIS — I48.0 PAROXYSMAL ATRIAL FIBRILLATION (H): ICD-10-CM

## 2018-01-03 LAB — INR POINT OF CARE: 3 (ref 0.86–1.14)

## 2018-01-03 PROCEDURE — 99207 ZZC NO CHARGE NURSE ONLY: CPT

## 2018-01-03 PROCEDURE — 85610 PROTHROMBIN TIME: CPT | Mod: QW

## 2018-01-03 PROCEDURE — 36416 COLLJ CAPILLARY BLOOD SPEC: CPT

## 2018-01-03 NOTE — MR AVS SNAPSHOT
Shannan Churchillcheco   1/3/2018 2:30 PM   Anticoagulation Therapy Visit    Description:  86 year old female   Provider:  [facility]   Department:  [facility]           INR as of 1/3/2018     Today's INR 3.0      Anticoagulation Summary as of 1/3/2018     INR goal 2.0-3.0   Today's INR 3.0   Full instructions 5 mg every day   Next INR check 1/31/2018    Indications   Paroxysmal atrial fibrillation (H) [I48.0]  Long-term (current) use of anticoagulants [Z79.01] [Z79.01]         Your next Anticoagulation Clinic appointment(s)     Jan 03, 2018  2:30 PM CST   Anticoagulation Visit with DARA ANTI COAG   [facility] ([address] [id number] [phone]            Jan 31, 2018 10:30 AM CST   Anticoagulation Visit with DARA ANTI COAG   [facility] ([address] [id number] [phone]              Contact Numbers     [facility]  Please call [phone] to cancel and/or reschedule your appointment   Please call [phone] with any problems or questions regarding your therapy.        January 2018 Details    Sun Mon Tue Wed Thu Fri Sat      1               2               3      5 mg   See details      4      5 mg         5      5 mg         6      5 mg           7      5 mg         8      5 mg         9      5 mg         10      5 mg         11      5 mg         12      5 mg         13      5 mg           14      5 mg         15      5 mg         16      5 mg         17      5 mg         18      5 mg         19      5 mg         20      5 mg           21      5 mg         22      5 mg         23      5 mg         24      5 mg         25      5 mg         26      5 mg         27      5 mg           28      5 mg         29      5 mg         30      5 mg         31                Date Details   01/03 This INR check       Date of next INR:  1/31/2018         How to take your warfarin dose     To  Diabetes Self Management Training: Individual Review Visit    Cynthia Bryson presents today for education and evaluation of glucose control related to Type 2 diabetes.    She is accompanied by spouse and 2 daughters    Patient's diabetes management related comments/concerns: patient and spouse are pleased with improve glucose results they have been seeing with changes they've already made to diet. They would like to verify that what they are doing is right and get further direction for optimal diabetes management. Family is very involved in helping patient with making changes to improve diabetes management.    Patient's emotional response to diabetes: expresses readiness to learn and concern for health and well-being    Patient would like this visit to be focused around the following diabetes-related behaviors and goals: Assistance with making lifestyle changes and Healthy Eating    ASSESSMENT:  Patient Problem List and Family Medical History reviewed for relevant medical history, current medical status, and diabetes risk factors.    Patient's weight is up 1.5 lbs since last check. Spouse and family have been making changes to reduce breakfast carbohydrate, which appears to be having a positive impact in lowering BG later in the day. Advised continuing with changes to breakfast, ensuring at least 2 carbohydrate servings at lunch and dinner, and if weight is not gradually increasing change to 3 carbohydrates at lunch and dinner. Encouraged continued balanced diet, adding protein to breakfast, and healthy calories in the form of heart healthy fats and proteins throughout the day for continued weight gain.    Current Diabetes Management per Patient:  Taking diabetes medications?   yes:     Diabetes Medication(s)     Sulfonylureas Sig    glipiZIDE (GLUCOTROL) 5 MG tablet Take 2 tablets (10 mg) by mouth 2 times daily (before meals)        Past Diabetes Education: Yes    Patient glucose self monitoring as follows: two  "times daily.     BG results:     For 1 week prior to diet changes: fasting glucose- 144, 199, 119, 131, 154, 161, 134 and pre-supper glucose- 200, 142, 204, 198, 147, 211, 316; 43% in goal, average 176 mg/dL, 30% >200 mg/dL; 0% below 90 mg/dL    Since diet changes: fasting glucose- 160, 136, 94, 114, 165, 154, 120 and pre-supper glucose- 184, 191, 73 (after alcohol), 132, 167, 90; 46% in goal, average 137 mg/dL, 0% >200, 8% below 90 mg/dL    BG values are: Not in goal, but improving with diet changes  Patient's most recent A1C      6.6   12/26/2016 is meeting goal of <8.0    Nutrition:  Patient eats 3 meals per day, reports she was trying to reduce carbohydrates in the past to manage diabetes.    Breakfast - 1 cup oatmeal with walnuts, 1/4 banana or 1/3 cup blueberries, 1/2-1 slice bread with margarine and sugar-free jelly, decaf coffee (3.5 carbohydrates)    Lunch - small potato, trout with onions, salad with dressing, coffee (2 carbohydrates) OR roast beef, potato, gravy, carrots, onions, mushrooms, 1 slice pineapple, 1 rum drink with diet coke OR pork and sauerkraut + small potato + 1/2 ice cream bar + 5 grapes + 6 nuts OR pork and sauerkraut with small potato and 2 weiners + coffee (1-3 carbohydrates)     Dinner - yogurt (yoplait) + nuts OR 1 Boost drink, yogurt, and nuts OR 1 Boost, 1/2 ice cream bar, nuts OR Boost and 1/2 banana and 1/2 cup grapes (1.5-3 carbohydrates)     Snacks - nuts    Beverages: Coffee 2-3/day    Cultural/Yazidism diet restrictions: No     Biggest Challenge to Healthy Eating: none    Physical Activity:    Not assessed today  Limitations: none noted    Diabetes Risk Factors:  family history, age over 45 years, hyperlipidemia and hypertriglyceridemia    Diabetes Complications:  Chronic Complications: cardiovascular disease    Vitals:  Wt 49.215 kg (108 lb 8 oz)  Estimated body mass index is 20.23 kg/(m^2) as calculated from the following:    Height as of 2/9/16: 1.549 m (5' 0.98\").    " take:  5 mg Take 1 of the 5 mg tablets.            Weight as of 12/26/16: 48.535 kg (107 lb).   Last 3 BP:   BP Readings from Last 3 Encounters:   12/26/16 122/72   10/19/16 122/73   09/21/16 116/69       History   Smoking status     Never Smoker    Smokeless tobacco     Never Used       Labs:  A1C      6.6   12/26/2016  GLC      267   9/15/2016  LDL   Cannot estimate LDL when triglyceride exceeds 400 mg/dL   3/24/2016  LDL       73   3/24/2016  HDL CHOLESTEROL   Date Value Ref Range Status   03/24/2016 38* >49 mg/dL Final   ]  GFR ESTIMATE   Date Value Ref Range Status   09/15/2016 51* >60 mL/min/1.7m2 Final     Comment:     Non  GFR Calc     GFR ESTIMATE IF BLACK   Date Value Ref Range Status   09/15/2016 62 >60 mL/min/1.7m2 Final     Comment:      GFR Calc     CR     1.03   9/15/2016  CR       34   9/22/2014  No results found for this basename: microalbumin    Socio/Economic Considerations:    Support system: spouse/significant other and children    Health Beliefs and Attitudes:   Patient Activation Measure Survey Score:  MARKUS Score (Last Two) 11/16/2012   MARKUS Raw Score 32   Activation Score 40.1   MARKUS Level 1       Stage of Change: ACTION (Actively working towards change)      Diabetes knowledge and skills assessment:     Patient and family are knowledgeable in diabetes management concepts related to: Healthy Eating, Monitoring, Taking Medication and Healthy Coping    Patient needs further education on the following diabetes management concepts: Healthy Eating and Problem Solving    Barriers to Learning Assessment: Cognitive impairment - patient with dementia    Based on learning assessment above, most appropriate setting for further diabetes education would be: Individual setting.    INTERVENTION:   Education provided today on:  AADE Self-Care Behaviors:  Healthy Eating: carbohydrate counting and consistency in amount, composition, and timing of food intake  Reducing Risks: prevention of complications and BG and A1C  goals    Opportunities for ongoing education and support in diabetes-self management were discussed.    Pt verbalized understanding of concepts discussed and recommendations provided today.       Education Materials Provided:  Carbohydrate Counting    PLAN:  See Patient Instructions for co-developed, patient-stated behavior change goals.  AVS printed and provided to patient today.    FOLLOW-UP:  Follow-up appointment scheduled on 3/2/17.  Chart routed to referring provider.    Ongoing plan for education and support: Written resources (magazines, books, etc.) and Follow-up visit with diabetes educator in 4-6 weeks.    Chery Marin, MPH RD LD CDE   Time Spent: 45 minutes  Encounter Type: Individual

## 2018-01-03 NOTE — PROGRESS NOTES
ANTICOAGULATION FOLLOW-UP CLINIC VISIT    Patient Name:  Shannan Magdaleno  Date:  1/3/2018  Contact Type:  Face to Face    SUBJECTIVE:     Patient Findings     Positives No Problem Findings           OBJECTIVE    INR Protime   Date Value Ref Range Status   01/03/2018 3.0 (A) 0.86 - 1.14 Final       ASSESSMENT / PLAN  INR assessment THER    Recheck INR In: 4 WEEKS    INR Location Clinic      Anticoagulation Summary as of 1/3/2018     INR goal 2.0-3.0   Today's INR 3.0   Maintenance plan 5 mg (5 mg x 1) every day   Full instructions 5 mg every day   Weekly total 35 mg   Plan last modified Moon Lobato RN (11/1/2017)   Next INR check 1/31/2018   Priority INR   Target end date Indefinite    Indications   Paroxysmal atrial fibrillation (H) [I48.0]  Long-term (current) use of anticoagulants [Z79.01] [Z79.01]         Anticoagulation Episode Summary     INR check location     Preferred lab     Send INR reminders to [facility] CLIN[facility]    Comments       Anticoagulation Care Providers     Provider Role Specialty Phone number    Adela Morgan MD [facility] Internal Medicine [phone]            See the Encounter Report to view Anticoagulation Flowsheet and Dosing Calendar (Go to Encounters tab in chart review, and find the Anticoagulation Therapy Visit)    Dosage adjustment made based on physician directed care plan.    Moon Lobato RN

## 2018-01-14 ENCOUNTER — TELEPHONE (OUTPATIENT)
Dept: INTERNAL MEDICINE | Facility: CLINIC | Age: 83
End: 2018-01-14

## 2018-01-14 DIAGNOSIS — F41.9 ANXIETY: ICD-10-CM

## 2018-01-15 RX ORDER — DIAZEPAM 2 MG
TABLET ORAL
Qty: 60 TABLET | Refills: 0 | Status: SHIPPED | OUTPATIENT
Start: 2018-01-15 | End: 2018-04-19

## 2018-01-15 NOTE — TELEPHONE ENCOUNTER
Controlled Substance Refill Request for Diazepam  Problem List Complete:  No     PROVIDER TO CONSIDER COMPLETION OF PROBLEM LIST AND OVERVIEW/CONTROLLED SUBSTANCE AGREEMENT    Last Written Prescription Date:  9/22/17  Last Fill Quantity: 60,   # refills: 1    Last Office Visit with [facility] primary care provider: 11/7/17    Future Office visit:     Controlled substance agreement on file: No.     Processing:  ?     checked in past 6 months?  Yes today     [place] reviewed:  reviewed- no concerns    [place]  [email or web address] [email or web address]    Tania Hall RN

## 2018-01-15 NOTE — TELEPHONE ENCOUNTER
diazepam (VALIUM) 2 MG tablet      Last Written Prescription Date:  9/22/2017  Last Fill Quantity: 60,   # refills: 1  Last Office Visit: 11/7/2017  Future Office visit:       Routing refill request to provider for review/approval because:  Drug not on the [facility], [facility] or [facility] refill protocol or controlled substance

## 2018-01-16 NOTE — TELEPHONE ENCOUNTER
Valium prescription faxed to [facility] at [phone].  Marifer Nieves,

## 2018-01-25 ENCOUNTER — TRANSFERRED RECORDS (OUTPATIENT)
Dept: HEALTH INFORMATION MANAGEMENT | Facility: CLINIC | Age: 83
End: 2018-01-25

## 2018-01-31 ENCOUNTER — ANTICOAGULATION THERAPY VISIT (OUTPATIENT)
Dept: NURSING | Facility: CLINIC | Age: 83
End: 2018-01-31
Payer: COMMERCIAL

## 2018-01-31 DIAGNOSIS — Z79.01 LONG-TERM (CURRENT) USE OF ANTICOAGULANTS: ICD-10-CM

## 2018-01-31 DIAGNOSIS — I48.0 PAROXYSMAL ATRIAL FIBRILLATION (H): ICD-10-CM

## 2018-01-31 LAB — INR POINT OF CARE: 2.8 (ref 0.86–1.14)

## 2018-01-31 PROCEDURE — 36416 COLLJ CAPILLARY BLOOD SPEC: CPT

## 2018-01-31 PROCEDURE — 99207 ZZC NO CHARGE NURSE ONLY: CPT

## 2018-01-31 PROCEDURE — 85610 PROTHROMBIN TIME: CPT | Mod: QW

## 2018-01-31 NOTE — MR AVS SNAPSHOT
Shannan TERAN Rasta   1/31/2018 10:30 AM   Anticoagulation Therapy Visit    Description:  86 year old female   Provider:  [facility]AG   Department:  [facility]           INR as of 1/31/2018     Today's INR 2.8      Anticoagulation Summary as of 1/31/2018     INR goal 2.0-3.0   Today's INR 2.8   Full instructions 5 mg every day   Next INR check 2/28/2018    Indications   Paroxysmal atrial fibrillation (H) [I48.0]  Long-term (current) use of anticoagulants [Z79.01] [Z79.01]         Your next Anticoagulation Clinic appointment(s)     Jan 31, 2018 10:30 AM CST   Anticoagulation [facility] with DARA ANTI COAG   [facility]    [address] [place] [id number] [phone]            Feb 28, 2018  9:45 AM CST   Anticoagulation Visit with DARA ANTI COAG   [facility] ([address] [id number] [phone]              Contact Numbers     [facility]  Please call [phone] to cancel and/or reschedule your appointment   Please call [phone] with any problems or questions regarding your therapy.        January 2018 Details    Sun Mon Tue Wed Thu Fri Sat      1               2               3               4               5               6                 7               8               9               10               11               12               13                 14               15               16               17               18               19               20                 21               22               23               24               25               26               27                 28               29               30               31      5 mg   See details          Date Details   01/31 This INR check               How to take your warfarin dose     To take:  5 mg Take 1 of the 5 mg tablets.           February 2018 Details    Sun Mon Tue Wed u Fri Sat         1      5 mg         2       5 mg         3      5 mg           4      5 mg         5      5 mg         6      5 mg         7      5 mg         8      5 mg         9      5 mg         10      5 mg           11      5 mg         12      5 mg         13      5 mg         14      5 mg         15      5 mg         16      5 mg         17      5 mg           18      5 mg         19      5 mg         20      5 mg         21      5 mg         22      5 mg         23      5 mg         24      5 mg           25      5 mg         26      5 mg         27      5 mg         28                Date Details   No additional details    Date of next INR:  2/28/2018         How to take your warfarin dose     To take:  5 mg Take 1 of the 5 mg tablets.

## 2018-01-31 NOTE — PROGRESS NOTES
ANTICOAGULATION FOLLOW-UP CLINIC VISIT    Patient Name:  Shannan Magdaleno  Date:  1/31/2018  Contact Type:  Face to Face    SUBJECTIVE:     Patient Findings     Positives No Problem Findings           OBJECTIVE    INR Protime   Date Value Ref Range Status   01/31/2018 2.8 (A) 0.86 - 1.14 Final       ASSESSMENT / PLAN  INR assessment THER    Recheck INR In: 4 WEEKS    INR Location Clinic      Anticoagulation Summary as of 1/31/2018     INR goal 2.0-3.0   Today's INR 2.8   Maintenance plan 5 mg (5 mg x 1) every day   Full instructions 5 mg every day   Weekly total 35 mg   No change documented Moon Lobato, RN   Plan last modified Moon Lobato RN (11/1/2017)   Next INR check 2/28/2018   Priority INR   Target end date Indefinite    Indications   Paroxysmal atrial fibrillation (H) [I48.0]  Long-term (current) use of anticoagulants [Z79.01] [Z79.01]         Anticoagulation Episode Summary     INR check location     Preferred lab     Send INR reminders to [facility]    Comments       Anticoagulation Care Providers     Provider Role Specialty Phone number    Adela Morgan MD [facility] Internal Medicine [phone]            See the Encounter Report to view Anticoagulation Flowsheet and Dosing Calendar (Go to Encounters tab in chart review, and find the Anticoagulation Therapy Visit)    Dosage adjustment made based on physician directed care plan.    Moon Lobato RN

## 2018-02-28 ENCOUNTER — ANTICOAGULATION THERAPY VISIT (OUTPATIENT)
Dept: NURSING | Facility: CLINIC | Age: 83
End: 2018-02-28
Payer: COMMERCIAL

## 2018-02-28 DIAGNOSIS — Z79.01 LONG-TERM (CURRENT) USE OF ANTICOAGULANTS: ICD-10-CM

## 2018-02-28 DIAGNOSIS — I48.0 PAROXYSMAL ATRIAL FIBRILLATION (H): ICD-10-CM

## 2018-02-28 LAB — INR POINT OF CARE: 2.6 (ref 0.86–1.14)

## 2018-02-28 PROCEDURE — 99207 ZZC NO CHARGE NURSE ONLY: CPT

## 2018-02-28 PROCEDURE — 36416 COLLJ CAPILLARY BLOOD SPEC: CPT

## 2018-02-28 PROCEDURE — 85610 PROTHROMBIN TIME: CPT | Mod: QW

## 2018-02-28 NOTE — MR AVS SNAPSHOT
Shannan Churchillcheco   2/28/2018 9:45 AM   Anticoagulation Therapy Visit    Description:  86 year old female   Provider:  [facility] ANTI COAG   Department:  [facility]           INR as of 2/28/2018     Today's INR 2.6      Anticoagulation Summary as of 2/28/2018     INR goal 2.0-3.0   Today's INR 2.6   Full instructions 5 mg every day   Next INR check 3/28/2018    Indications   Paroxysmal atrial fibrillation (H) [I48.0]  Long-term (current) use of anticoagulants [Z79.01] [Z79.01]         Your next Anticoagulation Clinic appointment(s)     Feb 28, 2018  9:45 AM CST   Anticoagulation Visit with DARA ANTI COAG   [facility] ([address] [id number] [phone]            Mar 28, 2018  9:45 AM CDT   Anticoagulation Visit with DARA ANTI COAG   [facility] ([address] [id number] [phone]              Contact Numbers     [facility]  Please call [phone] to cancel and/or reschedule your appointment   Please call [phone] with any problems or questions regarding your therapy.        February 2018 Details    Sun Mon Tue Wed Thu Fri Sat         1               2               3                 4               5               6               7               8               9               10                 11               12               13               14               15               16               17                 18               19               20               21               22               23               24                 25               26               27               28      5 mg   See details          Date Details   02/28 This INR check               How to take your warfarin dose     To take:  5 mg Take 1 of the 5 mg tablets.           March 2018 Details    Sun Mon Tue Wed u Fri Sat         1      5 mg         2      5 mg         3      5 mg           4      5  mg         5      5 mg         6      5 mg         7      5 mg         8      5 mg         9      5 mg         10      5 mg           11      5 mg         12      5 mg         13      5 mg         14      5 mg         15      5 mg         16      5 mg         17      5 mg           18      5 mg         19      5 mg         20      5 mg         21      5 mg         22      5 mg         23      5 mg         24      5 mg           25      5 mg         26      5 mg         27      5 mg         28            29               30               31                Date Details   No additional details    Date of next INR:  3/28/2018         How to take your warfarin dose     To take:  5 mg Take 1 of the 5 mg tablets.

## 2018-02-28 NOTE — PROGRESS NOTES
ANTICOAGULATION FOLLOW-UP CLINIC VISIT    Patient Name:  Shannan Magdaleno  Date:  2/28/2018  Contact Type:  Face to Face    SUBJECTIVE:     Patient Findings     Positives No Problem Findings           OBJECTIVE    INR Protime   Date Value Ref Range Status   02/28/2018 2.6 (A) 0.86 - 1.14 Final       ASSESSMENT / PLAN  INR assessment THER    Recheck INR In: 4 WEEKS    INR Location Clinic      Anticoagulation Summary as of 2/28/2018     INR goal 2.0-3.0   Today's INR 2.6   Maintenance plan 5 mg (5 mg x 1) every day   Full instructions 5 mg every day   Weekly total 35 mg   No change documented Moon Lobato, RN   Plan last modified Moon Lobato RN (11/1/2017)   Next INR check 3/28/2018   Priority INR   Target end date Indefinite    Indications   Paroxysmal atrial fibrillation (H) [I48.0]  Long-term (current) use of anticoagulants [Z79.01] [Z79.01]         Anticoagulation Episode Summary     INR check location     Preferred lab     Send INR reminders to [facility]    Comments       Anticoagulation Care Providers     Provider Role Specialty Phone number    Adela Morgan MD [facility] [phone]            See the Encounter Report to view Anticoagulation Flowsheet and Dosing Calendar (Go to Encounters tab in chart review, and find the Anticoagulation Therapy Visit)    Dosage adjustment made based on physician directed care plan.    Moon Lobato RN

## 2018-03-01 ENCOUNTER — RADIANT APPOINTMENT (OUTPATIENT)
Dept: GENERAL RADIOLOGY | Facility: CLINIC | Age: 83
End: 2018-03-01
Attending: PHYSICIAN ASSISTANT
Payer: COMMERCIAL

## 2018-03-01 ENCOUNTER — OFFICE VISIT (OUTPATIENT)
Dept: URGENT CARE | Facility: URGENT CARE | Age: 83
End: 2018-03-01
Payer: COMMERCIAL

## 2018-03-01 VITALS
OXYGEN SATURATION: 95 % | RESPIRATION RATE: 18 BRPM | BODY MASS INDEX: 27.31 KG/M2 | WEIGHT: 149.3 LBS | DIASTOLIC BLOOD PRESSURE: 76 MMHG | HEART RATE: 68 BPM | SYSTOLIC BLOOD PRESSURE: 134 MMHG | TEMPERATURE: 100 F

## 2018-03-01 DIAGNOSIS — J10.1 INFLUENZA A: Primary | ICD-10-CM

## 2018-03-01 DIAGNOSIS — R05.9 COUGH: ICD-10-CM

## 2018-03-01 LAB
FLUAV+FLUBV AG SPEC QL: NEGATIVE
FLUAV+FLUBV AG SPEC QL: POSITIVE
SPECIMEN SOURCE: ABNORMAL

## 2018-03-01 PROCEDURE — 99214 OFFICE O/P EST MOD 30 MIN: CPT | Performed by: PHYSICIAN ASSISTANT

## 2018-03-01 PROCEDURE — 87804 INFLUENZA ASSAY W/OPTIC: CPT | Performed by: PHYSICIAN ASSISTANT

## 2018-03-01 PROCEDURE — 71046 X-RAY EXAM CHEST 2 VIEWS: CPT | Mod: FY

## 2018-03-01 RX ORDER — OSELTAMIVIR PHOSPHATE 75 MG/1
75 CAPSULE ORAL 2 TIMES DAILY
Qty: 10 CAPSULE | Refills: 0 | Status: SHIPPED | OUTPATIENT
Start: 2018-03-01 | End: 2018-03-05 | Stop reason: SINTOL

## 2018-03-01 ASSESSMENT — ENCOUNTER SYMPTOMS
CARDIOVASCULAR NEGATIVE: 1
NEUROLOGICAL NEGATIVE: 1
PSYCHIATRIC NEGATIVE: 1
GASTROINTESTINAL NEGATIVE: 1
EYES NEGATIVE: 1
MUSCULOSKELETAL NEGATIVE: 1

## 2018-03-01 ASSESSMENT — PAIN SCALES - GENERAL: PAINLEVEL: NO PAIN (0)

## 2018-03-01 NOTE — PROGRESS NOTES
SUBJECTIVE:   Shannan Magdaleno is a 86 year old female presenting with a chief complaint of   Chief Complaint   Patient presents with     Cough   .    Onset of symptoms was 3 day(s) ago.  Current and Associated symptoms: fever, runny nose and cough - productive (yellow), diarrhea  Treatment measures tried include None tried.  Predisposing factors include None.    This cough started 3 days ago - last night it was hard to sleep  Productive cough with a lot of phlegm  No shortness of breath  Fever today  She has not taken any medications  She had pneumonia about a year ago - admitted to the hospital for this    Review of Systems   Constitutional:        As in HPI   HENT:        As in HPI   Eyes: Negative.    Respiratory:        As in HPI   Cardiovascular: Negative.    Gastrointestinal: Negative.    Genitourinary: Negative.    Musculoskeletal: Negative.    Skin: Negative.    Neurological: Negative.    Psychiatric/Behavioral: Negative.          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     MI (myocardial infarction) 1998    nl stress test in 5/08     OA (osteoarthritis) of knee 4/16/2014     osteoarthritis      Osteopenia      Other and unspecified hyperlipidemia      PVC's 9/00    Event Monitor     Restless legs      Retroperitoneal lymphadenopathy 7/7/2015     Current Outpatient Prescriptions   Medication Sig Dispense Refill     oseltamivir (TAMIFLU) 75 MG capsule Take 1 capsule (75 mg) by mouth 2 times daily 10 capsule 0     escitalopram (LEXAPRO) 10 MG tablet TAKE 1&1/2 TABLETS (15 MG) BY MOUTH DAILY 135 tablet 1     diazepam (VALIUM) 2 MG tablet TAKE 1/2-1 TABLET BY MOUTH AT BEDTIME FOR ANXIETY 60 tablet 0     nitroGLYcerin (NITROSTAT) 0.4 MG sublingual tablet  Place 1 tablet (0.4 mg) under the tongue every 5 minutes as needed for chest pain 25 tablet 3     diclofenac (VOLTAREN) 1 % GEL topical gel Apply 4 grams to knees or 2 grams to hands four times daily as needed using enclosed dosing card. 100 g 1     ASPIRIN NOT PRESCRIBED (INTENTIONAL) Please choose reason not prescribed, below 0 each 0     mupirocin (BACTROBAN) 2 % ointment Apply to infected wound three times a day 30 g 1     simvastatin (ZOCOR) 40 MG tablet TAKE 1 TABLET (40 MG) BY MOUTH AT BEDTIME 90 tablet 3     metoprolol (LOPRESSOR) 25 MG tablet Take 1.5 tablets (37.5 mg) by mouth 2 times daily 270 tablet 1     mometasone (ELOCON) 0.1 % cream Apply sparingly to affected area twice daily for 1 week. 15 g 0     calcium-vitamin D (CALTRATE) 600-400 MG-UNIT per tablet Take 1 tablet by mouth daily       Multiple Vitamins-Minerals (CENTRUM SILVER) per tablet Take 1 tablet by mouth daily Has 2000 IU of Vitamin D in it. 30 tablet      acetaminophen (TYLENOL) 500 MG tablet Take 1-2 tablets by mouth every 6 hours as needed.       warfarin (COUMADIN) 5 MG tablet TAKE 1 & 1/2 TABLETS ON WED, AND 1 TAB REST OF WEEK 90 tablet 3     [DISCONTINUED] escitalopram (LEXAPRO) 10 MG tablet Take 1 tablet (10 mg) by mouth daily 90 tablet 3     Social History   Substance Use Topics     Smoking status: Never Smoker     Smokeless tobacco: Never Used     Alcohol use No       OBJECTIVE  /76 (BP Location: Right arm, Patient Position: Sitting, Cuff Size: Adult Regular)  Pulse 68  Temp 100  F (37.8  C) (Oral)  Resp 18  Wt 149 lb 4.8 oz (67.7 kg)  SpO2 95%  BMI 27.31 kg/m2    Physical Exam   Constitutional: She is oriented to person, place, and time and well-developed, well-nourished, and in no distress.   HENT:   Head: Normocephalic and atraumatic.   Right Ear: Tympanic membrane and ear canal normal.   Left Ear: Tympanic membrane and ear canal normal.   Nose: Nose normal.   Mouth/Throat: Uvula is midline, oropharynx is clear and  moist and mucous membranes are normal.   Eyes: Conjunctivae and EOM are normal. Pupils are equal, round, and reactive to light.   Neck: Normal range of motion. Neck supple.   Cardiovascular: Normal rate, regular rhythm and normal heart sounds.    Pulmonary/Chest: Effort normal and breath sounds normal.   Neurological: She is alert and oriented to person, place, and time. Gait normal.   Skin: Skin is warm and dry.   Psychiatric: Mood and affect normal.       Labs:  Results for orders placed or performed in visit on 03/01/18 (from the past 24 hour(s))   Influenza A/B antigen   Result Value Ref Range    Influenza A/B Agn Specimen Nasal     Influenza A Positive (A) NEG^Negative    Influenza B Negative NEG^Negative   XR Chest 2 Views    Narrative    XR CHEST 2 VW 3/1/2018 12:29 PM    COMPARISON: 5/8/2017    HISTORY: Productive cough and fever, concern for pneumonia.      Impression    IMPRESSION: Chest granulomas again seen in the bases. Lungs otherwise  clear. No pleural effusion or pneumothorax.    CAMERON RIZVI MD           ASSESSMENT:      ICD-10-CM    1. Influenza A J10.1 oseltamivir (TAMIFLU) 75 MG capsule   2. Cough R05 Influenza A/B antigen     XR Chest 2 Views   3. Fever R50.9 Influenza A/B antigen        Medical Decision Making:    I recommend tamiflu treatment in her age group  With history of pneumonia, and today a fever with productive cough - chest XR was also obtained    PLAN:    URI Adult:  Tylenol, Ibuprofen, Fluids, Rest and RX influenza  Tamiflu 75 mg bid x 5 days    Followup:    If not improving or if conditions worsens over the next 12-24 hours, go to the Emergency Department    There are no Patient Instructions on file for this visit.    Gina Andres PA-C

## 2018-03-01 NOTE — MR AVS SNAPSHOT
After Visit Summary   3/1/2018    Shannan Magdaleno    MRN: [id number]           Patient Information     Date Of Birth          12/2/1931        Visit Information        Provider Department      3/1/2018 11:30 AM Gina Andres PA-C [facility]        Today's Diagnoses     Influenza A    -  1    Cough           Follow-ups after your visit        Your next 10 appointments already scheduled     Mar 28, 2018  9:45 AM CDT   Anticoagulation Visit with FZ ANTI COAG   [facility] ([facility]    [address]  [place] [address] [phone]              Who to contact     If you have questions or need follow up information about today's clinic visit or your schedule please contact [facility] directly at [phone].  Normal or non-critical lab and imaging results will be communicated to you by [employer], letter or phone within 4 business days after the clinic has received the results. If you do not hear from us within 7 days, please contact the clinic through MyChart or phone. If you have a critical or abnormal lab result, we will notify you by phone as soon as possible.  Submit refill requests through [employer] or call your pharmacy and they will forward the refill request to us. Please allow 3 business days for your refill to be completed.          Additional Information About Your Visit        MyChart Information     [employer] gives you secure access to your electronic health record. If you see a primary care provider, you can also send messages to your care team and make appointments. If you have questions, please call your primary care clinic.  If you do not have a primary care provider, please call [phone] and they will assist you.        Care EveryWhere ID     This is your Care EveryWhere ID. This could be used by other organizations to access your [facility] medical records  [id number]        Your Vitals Were      Pulse Temperature Respirations Pulse Oximetry BMI (Body Mass Index)       68 100  F (37.8  C) (Oral) 18 95% 27.31 kg/m2        Blood Pressure from Last 3 Encounters:   03/01/18 134/76   12/23/17 143/78   11/07/17 138/64    Weight from Last 3 Encounters:   03/01/18 149 lb 4.8 oz (67.7 kg)   12/23/17 146 lb 6.4 oz (66.4 kg)   11/07/17 149 lb 12.8 oz (67.9 kg)              We Performed the Following     Influenza A/B antigen     XR Chest 2 Views          Today's Medication Changes          These changes are accurate as of 3/1/18  2:14 PM.  If you have any questions, ask your nurse or doctor.               Start taking these medicines.        Dose/Directions    oseltamivir 75 MG capsule   Commonly known as:  TAMIFLU   Used for:  Influenza A   Started by:  Gina Andres PA-C        Dose:  75 mg   Take 1 capsule (75 mg) by mouth 2 times daily   Quantity:  10 capsule   Refills:  0            Where to get your medicines      These medications were sent to [facility]pharm[facility] #[id number] - [place], [address] [place] [address], [facility] [address]     Phone:  [phone]     oseltamivir 75 MG capsule                Primary Care Provider Office Phone # Fax #    Adela Morgan [phone] [address] [address]        Equal Access to Services     [facility] AH: Hadii taz colmenareso Sojosselyn, waaxda luqadaha, qaybta kaalmada carlton, waxay angel sullivan aderené valadez. So [facility] [phone].    ATENCIÓN: Si habla español, tiene a linn disposición servicios gratuitos de asistencia lingüística. Kamla al [phone].    We comply with applicable federal civil rights laws and [place] laws. We do not discriminate on the basis of race, color, national origin, age, disability, sex, sexual orientation, or gender identity.            Thank you!     Thank you for choosing [facility]  for your care. Our goal is  always to provide you with excellent care. Hearing back from our patients is one way we can continue to improve our services. Please take a few minutes to complete the written survey that you may receive in the mail after your visit with us. Thank you!             Your Updated Medication List - Protect others around you: Learn how to safely use, store and throw away your medicines at www.disposemymeds.org.          This list is accurate as of 3/1/18  2:14 PM.  Always use your most recent med list.                   Brand Name Dispense Instructions for use Diagnosis    ASPIRIN NOT PRESCRIBED    INTENTIONAL    0 each    Please choose reason not prescribed, below    Paroxysmal atrial fibrillation (H)       calcium-vitamin D 600-400 MG-UNIT per tablet    CALTRATE     Take 1 tablet by mouth daily        CENTRUM SILVER per tablet     30 tablet    Take 1 tablet by mouth daily Has 2000 IU of Vitamin D in it.    Osteopenia       diazepam 2 MG tablet    VALIUM    60 tablet    TAKE 1/2-1 TABLET BY MOUTH AT BEDTIME FOR ANXIETY    Anxiety       diclofenac 1 % Gel topical gel    VOLTAREN    100 g    Apply 4 grams to knees or 2 grams to hands four times daily as needed using enclosed dosing card.    Left foot pain, Osteoarthritis, unspecified osteoarthritis type, unspecified site       escitalopram 10 MG tablet    LEXAPRO    135 tablet    TAKE 1&1/2 TABLETS (15 MG) BY MOUTH DAILY    Anxiety       metoprolol tartrate 25 MG tablet    LOPRESSOR    270 tablet    Take 1.5 tablets (37.5 mg) by mouth 2 times daily    Paroxysmal atrial fibrillation (H)       mometasone 0.1 % cream    ELOCON    15 g    Apply sparingly to affected area twice daily for 1 week.    Skin lesion of left leg       mupirocin 2 % ointment    BACTROBAN    30 g    Apply to infected wound three times a day    Local infection of wound       nitroGLYcerin 0.4 MG sublingual tablet    NITROSTAT    25 tablet    Place 1 tablet (0.4 mg) under the tongue every 5 minutes as  needed for chest pain    Coronary artery disease involving native coronary artery of native heart without angina pectoris       oseltamivir 75 MG capsule    TAMIFLU    10 capsule    Take 1 capsule (75 mg) by mouth 2 times daily    Influenza A       simvastatin 40 MG tablet    ZOCOR    90 tablet    TAKE 1 TABLET (40 MG) BY MOUTH AT BEDTIME    Hyperlipidemia LDL goal <70       TYLENOL 500 MG tablet   Generic drug:  acetaminophen      Take 1-2 tablets by mouth every 6 hours as needed.        warfarin 5 MG tablet    COUMADIN    90 tablet    TAKE 1 & 1/2 TABLETS ON WED, AND 1 TAB REST OF WEEK    Paroxysmal atrial fibrillation (H)

## 2018-03-03 ENCOUNTER — MYC MEDICAL ADVICE (OUTPATIENT)
Dept: INTERNAL MEDICINE | Facility: CLINIC | Age: 83
End: 2018-03-03

## 2018-03-03 ENCOUNTER — OFFICE VISIT (OUTPATIENT)
Dept: URGENT CARE | Facility: URGENT CARE | Age: 83
End: 2018-03-03
Payer: COMMERCIAL

## 2018-03-03 VITALS
HEART RATE: 76 BPM | RESPIRATION RATE: 15 BRPM | DIASTOLIC BLOOD PRESSURE: 60 MMHG | WEIGHT: 148 LBS | BODY MASS INDEX: 27.07 KG/M2 | TEMPERATURE: 98.6 F | SYSTOLIC BLOOD PRESSURE: 122 MMHG | OXYGEN SATURATION: 96 %

## 2018-03-03 DIAGNOSIS — R19.7 DIARRHEA, UNSPECIFIED TYPE: Primary | ICD-10-CM

## 2018-03-03 PROCEDURE — 99213 OFFICE O/P EST LOW 20 MIN: CPT | Performed by: NURSE PRACTITIONER

## 2018-03-03 ASSESSMENT — ENCOUNTER SYMPTOMS
DIARRHEA: 1
COUGH: 1
CARDIOVASCULAR NEGATIVE: 1
WHEEZING: 0
SHORTNESS OF BREATH: 0
NEUROLOGICAL NEGATIVE: 1
MUSCULOSKELETAL NEGATIVE: 1
SPUTUM PRODUCTION: 0

## 2018-03-03 NOTE — Clinical Note
Mrs Magdaleno presented with some GI intolerance to Tamiflu. She is otherwise doing quite well with influenza dx. I recommended a recheck next week just to monitor her progress, though no significant concerns other then age and frailty are noted.  Thanks,  Zoila Houser APRN, CNP

## 2018-03-03 NOTE — PATIENT INSTRUCTIONS
Influenza (Adult)    Influenza is also called the flu. It is a viral illness that affects the air passages of your lungs. It is different from the common cold. The flu can easily be passed from one to person to another. It may be spread through the air by coughing and sneezing. Or it can be spread by touching the sick person and then touching your own eyes, nose, or mouth.  The flu starts 1 to 3 days after you are exposed to the flu virus. It may last for 1 to 2 weeks but many people feel tired or fatigued for many weeks afterward. You usually don t need to take antibiotics unless you have a complication. This might be an ear or sinus infection or pneumonia.  Symptoms of the flu may be mild or severe. They can include extreme tiredness (wanting to stay in bed all day), chills, fevers, muscle aches, soreness with eye movement, headache, and a dry, hacking cough.  Home care  Follow these guidelines when caring for yourself at home:    Avoid being around cigarette smoke, whether yours or other people s.    Acetaminophen or ibuprofen will help ease your fever, muscle aches, and headache. Don t give aspirin to anyone younger than 18 who has the flu. Aspirin can harm the liver.    Nausea and loss of appetite are common with the flu. Eat light meals. Drink 6 to 8 glasses of liquids every day. Good choices are water, sport drinks, soft drinks without caffeine, juices, tea, and soup. Extra fluids will also help loosen secretions in your nose and lungs.    Over-the-counter cold medicines will not make the flu go away faster. But the medicines may help with coughing, sore throat, and congestion in your nose and sinuses. Don t use a decongestant if you have high blood pressure.    Stay home until your fever has been gone for at least 24 hours without using medicine to reduce fever.  Follow-up care  Follow up with your healthcare provider, or as advised, if you are not getting better over the next week.  If you are age 65 or  older, talk with your provider about getting a pneumococcal vaccine every 5 years. You should also get this vaccine if you have chronic asthma or COPD. All adults should get a flu vaccine every fall. Ask your provider about this.  When to seek medical advice  Call your healthcare provider right away if any of these occur:    Cough with lots of colored mucus (sputum) or blood in your mucus    Chest pain, shortness of breath, wheezing, or trouble breathing    Severe headache, or face, neck, or ear pain    New rash with fever    Fever of 100.4 F (38 C) or higher, or as directed by your healthcare provider    Confusion, behavior change, or seizure    Severe weakness or dizziness    You get a new fever or cough after getting better for a few days  Date Last Reviewed: 1/1/2017 2000-2017 [employer]. [address] [place] [address], [place] [address]. All rights reserved. This information is not intended as a substitute for professional medical care. Always follow your healthcare professional's instructions.

## 2018-03-03 NOTE — PROGRESS NOTES
HPI  Shannan Magdaleno 86 year old presents with diarrhea from tamiflu. Dx with influenza   A on 3/1. Last night had a loose stool and today 2 more. Less cough, no longer febrile and denies abdominal pain or dysuria.     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     MI (myocardial infarction) 1998    nl stress test in 5/08     OA (osteoarthritis) of knee 4/16/2014     osteoarthritis      Osteopenia      Other and unspecified hyperlipidemia      PVC's 9/00    Event Monitor     Restless legs      Retroperitoneal lymphadenopathy 7/7/2015     Past Surgical History:   Procedure Laterality Date     APPENDECTOMY       BREAST BIOPSY, RT/LT  1955    X 2 - benign     C ANESTH,REPAIR LO ABD HERNIA NOS  1995     CARDIAC CATHERIZATION  1/99 , 12/04    PTCA - Stent X 1     HC REMOVAL GALLBLADDER       Allergies   Allergen Reactions     Codeine Nausea and Vomiting     Latex Itching     Lisinopril      cough     Atorvastatin Calcium      Myalgias       Sulfa Drugs Nausea and Vomiting     Current Outpatient Prescriptions   Medication     oseltamivir (TAMIFLU) 75 MG capsule     escitalopram (LEXAPRO) 10 MG tablet     diazepam (VALIUM) 2 MG tablet     nitroGLYcerin (NITROSTAT) 0.4 MG sublingual tablet     diclofenac (VOLTAREN) 1 % GEL topical gel     ASPIRIN NOT PRESCRIBED (INTENTIONAL)     mupirocin (BACTROBAN) 2 % ointment     simvastatin (ZOCOR) 40 MG tablet     warfarin (COUMADIN) 5 MG tablet     metoprolol (LOPRESSOR) 25 MG tablet     mometasone (ELOCON) 0.1 % cream     calcium-vitamin D (CALTRATE) 600-400 MG-UNIT per tablet     Multiple Vitamins-Minerals (CENTRUM SILVER) per tablet     acetaminophen (TYLENOL) 500 MG tablet     No current facility-administered medications for  this visit.      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)     Restless leg syndrome     Hyperlipidemia LDL goal <100     Paroxysmal atrial fibrillation (H)     ACP (advance care planning)     Tremor     CAD (coronary artery disease)     Retinal dot hemorrhage or microaneuysm, os     Osteopenia     Insomnia     OA (osteoarthritis) of knee     Retroperitoneal lymphadenopathy     Spinal stenosis of lumbar region without neurogenic claudication     Lymphoma, small lymphocytic (H)     Long-term (current) use of anticoagulants [Z79.01]     Posterior vitreous detachment, right     Glaucoma suspect, bilateral     Driving safety issue     Pneumonia of left lower lobe due to infectious organism (H)     Anemia, unspecified type     Combined forms of age-related cataract, mild-mod, both eyes         Review of Systems   Constitutional: Positive for malaise/fatigue.   HENT: Negative.    Respiratory: Positive for cough. Negative for sputum production, shortness of breath and wheezing.    Cardiovascular: Negative.    Gastrointestinal: Positive for diarrhea.   Genitourinary: Negative.    Musculoskeletal: Negative.    Skin: Negative.    Neurological: Negative.    Endo/Heme/Allergies: Negative.          Physical Exam   Constitutional: She is well-developed, well-nourished, and in no distress. No distress.   /60  Pulse 76  Temp 98.6  F (37  C)  Resp 15  Wt 148 lb (67.1 kg)  SpO2 96%  BMI 27.07 kg/m2.      HENT:   Head: Normocephalic.   Right Ear: External ear normal.   Left Ear: External ear normal.   Mouth/Throat: Oropharynx is clear and moist.   Eyes: Conjunctivae are normal.   Neck: Normal range of motion.   Cardiovascular: Normal rate, regular rhythm and normal heart sounds.    Pulmonary/Chest: Effort normal and breath sounds normal. No respiratory distress. She has no wheezes. She has no rales.   Abdominal: Bowel  sounds are normal. There is no tenderness. There is no rebound and no guarding.   Musculoskeletal: Normal range of motion.   Lymphadenopathy:     She has cervical adenopathy.   Neurological: She is alert.   Skin: Skin is warm and dry. No rash noted.   Nursing note and vitals reviewed.    Assessment:  1. Diarrhea, unspecified type        Plan:  Stop tamiflu, push oral fluids and monitor urine output.  Continue self care for influenza  Tylenol 1-2 tabs po q4h prn as needed for pain or fever  Instructions regarding self-care of patient/child reviewed.   Written instructions provided in after visit summary and reviewed.  Patient instructed to see primary care provider for new or persistent symptoms.   Red flag symptoms reviewed and patient has been instructed to seek emergent   Care at the closest emergency room for evaluation and treatment.     Zoila Houser, ANDREW, CNP

## 2018-03-05 ENCOUNTER — OFFICE VISIT (OUTPATIENT)
Dept: INTERNAL MEDICINE | Facility: CLINIC | Age: 83
End: 2018-03-05
Payer: COMMERCIAL

## 2018-03-05 VITALS
TEMPERATURE: 99.1 F | OXYGEN SATURATION: 98 % | WEIGHT: 144 LBS | RESPIRATION RATE: 18 BRPM | DIASTOLIC BLOOD PRESSURE: 56 MMHG | BODY MASS INDEX: 25.52 KG/M2 | HEIGHT: 63 IN | HEART RATE: 68 BPM | SYSTOLIC BLOOD PRESSURE: 120 MMHG

## 2018-03-05 DIAGNOSIS — F41.9 ANXIETY: ICD-10-CM

## 2018-03-05 DIAGNOSIS — L98.9 SKIN LESION: ICD-10-CM

## 2018-03-05 DIAGNOSIS — R10.2 PELVIC PAIN IN FEMALE: ICD-10-CM

## 2018-03-05 DIAGNOSIS — K52.1 DIARRHEA DUE TO DRUG: ICD-10-CM

## 2018-03-05 DIAGNOSIS — J10.1 INFLUENZA A: Primary | ICD-10-CM

## 2018-03-05 LAB
ANION GAP SERPL CALCULATED.3IONS-SCNC: 8 MMOL/L (ref 3–14)
BUN SERPL-MCNC: 17 MG/DL (ref 7–30)
CALCIUM SERPL-MCNC: 9 MG/DL (ref 8.5–10.1)
CHLORIDE SERPL-SCNC: 104 MMOL/L (ref 94–109)
CO2 SERPL-SCNC: 26 MMOL/L (ref 20–32)
CREAT SERPL-MCNC: 0.74 MG/DL (ref 0.52–1.04)
GFR SERPL CREATININE-BSD FRML MDRD: 74 ML/MIN/1.7M2
GLUCOSE SERPL-MCNC: 84 MG/DL (ref 70–99)
POTASSIUM SERPL-SCNC: 4.2 MMOL/L (ref 3.4–5.3)
SODIUM SERPL-SCNC: 138 MMOL/L (ref 133–144)

## 2018-03-05 PROCEDURE — 80048 BASIC METABOLIC PNL TOTAL CA: CPT | Performed by: INTERNAL MEDICINE

## 2018-03-05 PROCEDURE — 36415 COLL VENOUS BLD VENIPUNCTURE: CPT | Performed by: INTERNAL MEDICINE

## 2018-03-05 PROCEDURE — 99214 OFFICE O/P EST MOD 30 MIN: CPT | Performed by: INTERNAL MEDICINE

## 2018-03-05 RX ORDER — ESCITALOPRAM OXALATE 10 MG/1
TABLET ORAL
Qty: 135 TABLET | Refills: 1 | Status: SHIPPED | OUTPATIENT
Start: 2018-03-05 | End: 2019-06-03

## 2018-03-05 NOTE — PROGRESS NOTES
"INTERNAL MEDICINE  SUBJECTIVE:   Shannan Magdaleno is a 86 year old female who presents to clinic today, with her , for the following health issues:    ED/UC Followup:    Facility:  [facility]  Date of visit: 3/1/18  Reason for visit: Influenza A and cough- treated with Tamiflu 75 mg bid for 5 days.  Current Status: improved       Update - After starting Tamiflu she experienced diarrhea. Patient only took three tablets of the Tamiflu (last dose Friday). She continues to have diarrhea. Notes that she only went to the bathroom once this morning when she was going multiple times in the morning. Denies abdominal pain. Patient is still coughing at night and feel chest congestion. Occasionally she feels short of breath. She does not feel ill like she did when she went in to .    Memory - Patient notes that her memory is \"iffy\" and her  notes that she has memory problems. He says she does not recall things that have happened. She says she will remember eventually but does not have immediate recall. Her  notes that the other day she was talking to someone, hung up, still had the phone in her hand, the phone rang, and she had difficulty finding the right button to answer. They are managing at home. Their kids bring them meals.      Lexapro - She is still taking Lexapro and feels it is working well. Her daughter typically sets up her medications for her.    Left ankle - The spot on her left ankle never fully resolved. It is still scabbed over and a little pink. Patient thinks that it is taking so long to heal because of the way they went about trying to remove it.    Urinary issues - For awhile now she has had pain with urination at night, with urgency. This has been present for sometime and is unchanged. She gets up a couple times a night to urinate depending on how much she drank before bed.       Problem list and histories reviewed & adjusted, as indicated.  Additional history: as documented    Labs " "reviewed in EPIC  Reviewed and updated as needed this visit by clinical staff  Tobacco  Allergies  Meds  Med Hx  Surg Hx  Fam Hx  Soc Hx      Reviewed and updated as needed this visit by Provider         ROS:  CONSTITUTIONAL: NEGATIVE for fever, chills, change in weight  INTEGUMENTARY/SKIN: NEGATIVE for worrisome rashes, moles or lesions  RESP: NEGATIVE for significant cough or SOB  CV: NEGATIVE for chest pain, palpitations or peripheral edema  GI: NEGATIVE for nausea, abdominal pain, heartburn, or change in bowel habits  NEURO: NEGATIVE for weakness, dizziness or paresthesias  PSYCHIATRIC: NEGATIVE for changes in mood or affect    This document serves as a record of the services and decisions personally performed and made by Adela Morgan MD. It was created on his/her behalf by Meka Barber, trained medical scribe. The creation of this document is based the provider's statements to the medical scribes.    Scribe Meka Barber 1:41 PM, March 5, 2018  OBJECTIVE:     /56  Pulse 68  Temp 99.1  F (37.3  C) (Oral)  Resp 18  Ht 1.594 m (5' 2.75\")  Wt 65.3 kg (144 lb)  SpO2 98%  BMI 25.71 kg/m2  Body mass index is 25.71 kg/(m^2).  GENERAL: alert and no distress  NECK: no adenopathy, no asymmetry, masses, or scars and thyroid normal to palpation  RESP: lungs clear to auscultation - no rales, rhonchi or wheezes  CV: regular rate and rhythm, normal S1 S2, no S3 or S4, no murmur, click or rub, no peripheral edema and peripheral pulses strong  ABDOMEN: soft, nontender, no hepatosplenomegaly, no masses and hyperactive bowel sounds  SKIN: no suspicious rashes. At the area of her scar there is flaking skin, no open wound, no erythema   NEURO: Normal strength and tone, mentation intact and speech normal  PSYCH: mentation appears normal, affect normal/bright    Diagnostic Test Results:  Results for orders placed or performed in visit on 03/01/18   XR Chest 2 Views    Narrative    XR CHEST 2 VW 3/1/2018 12:29 " PM    COMPARISON: 5/8/2017    HISTORY: Productive cough and fever, concern for pneumonia.      Impression    IMPRESSION: Chest granulomas again seen in the bases. Lungs otherwise  clear. No pleural effusion or pneumothorax.    CAMERON RIZVI MD   Influenza A/B antigen   Result Value Ref Range    Influenza A/B Agn Specimen Nasal     Influenza A Positive (A) NEG^Negative    Influenza B Negative NEG^Negative      ASSESSMENT/PLAN:   1. Influenza A  Pt was seen in  on 3/1/18 and prescribed Tamiflu 75 mg bid but only took three tablets with diarrhea onset (last dose 3/2/18) . She has a persistent cough and her breathing is stable today.  She feels well otherwise.  Discussed that at this time I do nol she needs further treatment but she will continue to monitor her sx and let me know if they worsen at any point. With prolonged period of diarrhea following last dosing of Tamiflu would recommend she be treated with Relenza should she contract Influenza in the future.    2. Anxiety  Stable. Continues on lexapro 10 mg daily.  - escitalopram (LEXAPRO) 10 MG tablet; TAKE 1&1/2 TABLETS (15 MG) BY MOUTH DAILY  Dispense: 135 tablet; Refill: 1    3. Diarrhea due to drug  Does not tolerate Tamiflu. I suspect this will resolve on its own.  Per patient instructions.   - Basic metabolic panel    4. Pelvic pain in female  Unclear etiology. She is getting up a couple times a night to urinate.  No other symptoms of a UTI.  Has no dysuria any other times of the day.    5. Skin lesion  Prolonged scar healing. She will try applying Vaseline to the area.      Patient Instructions     Try applying Vaseline to the spot on your left shin to help it heal.    You can take Imodium as needed with your diarrhea.    If your breathing and cough worsen let me know.    [facility]    If you have any questions regarding to your visit please contact your care team:     Team Pink:   Clinic Hours Telephone Number   Internal Medicine:  Dr. Chapman  Eddie Yin, NP       7am-7pm  Monday - Thursday   7am-5pm  Fridays  [phone]  (Appointment scheduling available 24/7)    Questions about your visit?  Team Line  [phone]   Urgent Care - [place] [facility] - 11am-9pm Monday-Friday Saturday-Sunday- 9am-5pm   [place] - 5pm-9pm Monday-Friday Saturday-Sunday- 9am-5pm  [phone] - [place]   [phone] - [place]       What options do I have for visits at the clinic other than the traditional office visit?  To expand how we care for you, many of our providers are utilizing electronic visits (e-visits) and telephone visits, when medically appropriate, for interactions with their patients rather than a visit in the clinic.   We also offer nurse visits for many medical concerns. Just like any other service, we will bill your insurance company for this type of visit based on time spent on the phone with your provider. Not all insurance companies cover these visits. Please check with your medical insurance if this type of visit is covered. You will be responsible for any charges that are not paid by your insurance.      E-visits via [place]:  generally incur a $35.00 fee.  Telephone visits:  Time spent on the phone: *charged based on time that is spent on the phone in increments of 10 minutes. Estimated cost:   5-10 mins $30.00   11-20 mins. $59.00   21-30 mins. $85.00   Use nothingGrinderhart (secure email communication and access to your chart) to send your primary care provider a message or make an appointment. Ask someone on your Team how to sign up for 1DayLatert.    For a Price Quote for your services, please call our Consumer Price Line at [phone].    As always, Thank you for trusting us with your health care needs!    Discharged by Nataly AGUILAR CMA ([facility])        I spent 17 minutes of time with the patient and >50% of it was in education and counseling regarding Influenza A follow up.    The information in  this document, created by the medical scribe for me, accurately reflects the services I personally performed and the decisions made by me. I have reviewed and approved this document for accuracy prior to leaving the patient care area.  Adela Morgan MD  1:41 PM, 03/05/18    Adela Morgan MD  [facility]    Start 1:41 PM  End 1:58 PM

## 2018-03-05 NOTE — MR AVS SNAPSHOT
After Visit Summary   3/5/2018    Shannan Magdaleno    MRN: [id number]           Patient Information     Date Of Birth          12/2/1931        Visit Information        Provider Department      3/5/2018 1:30 PM Adela Morgan MD [facility]        Today's Diagnoses     Influenza A    -  1    Anxiety        Diarrhea due to drug        Pelvic pain in female        Skin lesion          Care Instructions    Try applying Vaseline to the spot on your left shin to help it heal.    You can take Imodium as needed with your diarrhea.    If your breathing and cough worsen let me know.    [facility]    If you have any questions regarding to your visit please contact your care team:     Team Pink:   Clinic Hours Telephone Number   Internal Medicine:  Dr. Adela Yin NP       7am-7pm  Monday - Thursday   7am-5pm  Fridays  [phone]  (Appointment scheduling available 24/7)    Questions about your visit?  Team Line  [phone]   Urgent Care - [place] - 11am-9pm Monday-Friday Saturday-Sunday- 9am-5pm   [place] - 5pm-9pm Monday-Friday Saturday-Sunday- 9am-5pm  [phone] - [place]   [phone] - [place]       What options do I have for visits at the clinic other than the traditional office visit?  To expand how we care for you, many of our providers are utilizing electronic visits (e-visits) and telephone visits, when medically appropriate, for interactions with their patients rather than a visit in the clinic.   We also offer nurse visits for many medical concerns. Just like any other service, we will bill your insurance company for this type of visit based on time spent on the phone with your provider. Not all insurance companies cover these visits. Please check with your medical insurance if this type of visit is covered. You will be responsible for any charges that are not paid by your insurance.       E-visits via ScanÃ¢â‚¬Â¢Jourhart:  generally incur a $35.00 fee.  Telephone visits:  Time spent on the phone: *charged based on time that is spent on the phone in increments of 10 minutes. Estimated cost:   5-10 mins $30.00   11-20 mins. $59.00   21-30 mins. $85.00   Use ScanÃ¢â‚¬Â¢Jourhart (secure email communication and access to your chart) to send your primary care provider a message or make an appointment. Ask someone on your Team how to sign up for [employer]t.    For a Price Quote for your services, please call our Next Points Line at [phone].    As always, Thank you for trusting us with your health care needs!    Discharged by Nataly AGUILAR CMA ([facility])            Follow-ups after your visit        Your next 10 appointments already scheduled     Mar 28, 2018  9:45 AM CDT   Antico[facility] with [facility] ANTI COAG   [facility]    [address] [place], [place] [address] [address] [phone]              Who to contact     If you have questions or need follow up information about today's clinic visit or your schedule please contact [facility] directly at [phone].  Normal or non-critical lab and imaging results will be communicated to you by MyChart, letter or phone within 4 business days after the clinic has received the results. If you do not hear from us within 7 days, please contact the clinic through ScanÃ¢â‚¬Â¢Jourhart or phone. If you have a critical or abnormal lab result, we will notify you by phone as soon as possible.  Submit refill requests through [employer] or call your pharmacy and they will forward the refill request to us. Please allow 3 business days for your refill to be completed.          Additional Information About Your Visit        ScanÃ¢â‚¬Â¢Jourhart Information     [employer] gives you secure access to your electronic health record. If you see a primary care provider, you can also send messages to your care team and make appointments. If you have questions, please call your  "primary care clinic.  If you do not have a primary care provider, please call [phone] and they will assist you.        Care EveryWhere ID     This is your Care EveryWhere ID. This could be used by other organizations to access your [place] medical records  [id number]        Your Vitals Were     Pulse Temperature Respirations Height Pulse Oximetry BMI (Body Mass Index)    68 99.1  F (37.3  C) (Oral) 18 5' 2.75\" (1.594 m) 98% 25.71 kg/m2       Blood Pressure from Last 3 Encounters:   03/05/18 120/56   03/03/18 122/60   03/01/18 134/76    Weight from Last 3 Encounters:   03/05/18 144 lb (65.3 kg)   03/03/18 148 lb (67.1 kg)   03/01/18 149 lb 4.8 oz (67.7 kg)              We Performed the Following     Basic metabolic panel          Today's Medication Changes          These changes are accurate as of 3/5/18  2:03 PM.  If you have any questions, ask your nurse or doctor.               These medicines have changed or have updated prescriptions.        Dose/Directions    escitalopram 10 MG tablet   Commonly known as:  LEXAPRO   This may have changed:  See the new instructions.   Used for:  Anxiety   Changed by:  Adela Morgan MD        TAKE 1&1/2 TABLETS (15 MG) BY MOUTH DAILY   Quantity:  135 tablet   Refills:  1         Stop taking these medicines if you haven't already. Please contact your care team if you have questions.     oseltamivir 75 MG capsule   Commonly known as:  TAMIFLU   Stopped by:  Adela Morgan MD                Where to get your medicines      These medications were sent to [facility]/pharmacy #[id number] - [place], [place] - [address] [place] [address], [place] [address]     Phone:  [phone]     escitalopram 10 MG tablet                Primary Care Provider Office Phone # Fax #    Adela Morgan [phone] [address] [place] [address]        Equal Access to Services     HENRY ZIEGLER AH: Cari Silva, " waaxda luqadaha, qaybta kaalmada carlton, claudia jerniganaaclara ah. So [facility] [phone].    ATENCIÓN: Si juan jose alcala, tiene a linn disposición servicios gratuitos de asistencia lingüística. Kamla al [phone].    We comply with applicable federal civil rights laws and [place] laws. We do not discriminate on the basis of race, color, national origin, age, disability, sex, sexual orientation, or gender identity.            Thank you!     Thank you for choosing [facility]  for your care. Our goal is always to provide you with excellent care. Hearing back from our patients is one way we can continue to improve our services. Please take a few minutes to complete the written survey that you may receive in the mail after your visit with us. Thank you!             Your Updated Medication List - Protect others around you: Learn how to safely use, store and throw away your medicines at www.disposemymeds.org.          This list is accurate as of 3/5/18  2:03 PM.  Always use your most recent med list.                   Brand Name Dispense Instructions for use Diagnosis    ASPIRIN NOT PRESCRIBED    INTENTIONAL    0 each    Please choose reason not prescribed, below    Paroxysmal atrial fibrillation (H)       calcium-vitamin D 600-400 MG-UNIT per tablet    CALTRATE     Take 1 tablet by mouth daily        CENTRUM SILVER per tablet     30 tablet    Take 1 tablet by mouth daily Has 2000 IU of Vitamin D in it.    Osteopenia       diazepam 2 MG tablet    VALIUM    60 tablet    TAKE 1/2-1 TABLET BY MOUTH AT BEDTIME FOR ANXIETY    Anxiety       escitalopram 10 MG tablet    LEXAPRO    135 tablet    TAKE 1&1/2 TABLETS (15 MG) BY MOUTH DAILY    Anxiety       metoprolol tartrate 25 MG tablet    LOPRESSOR    270 tablet    Take 1.5 tablets (37.5 mg) by mouth 2 times daily    Paroxysmal atrial fibrillation (H)       nitroGLYcerin 0.4 MG sublingual tablet    NITROSTAT    25 tablet    Place 1 tablet (0.4  mg) under the tongue every 5 minutes as needed for chest pain    Coronary artery disease involving native coronary artery of native heart without angina pectoris       simvastatin 40 MG tablet    ZOCOR    90 tablet    TAKE 1 TABLET (40 MG) BY MOUTH AT BEDTIME    Hyperlipidemia LDL goal <70       TYLENOL 500 MG tablet   Generic drug:  acetaminophen      Take 1-2 tablets by mouth every 6 hours as needed.        warfarin 5 MG tablet    COUMADIN    90 tablet    TAKE 1 & 1/2 TABLETS ON WED, AND 1 TAB REST OF WEEK    Paroxysmal atrial fibrillation (H)

## 2018-03-05 NOTE — TELEPHONE ENCOUNTER
Can you check in to see how she is doing and if she needs seen this week?   Dr. Morgan     Spoke with patient.  The diarrhea has improved.  She is still pushing fluids  Per patient, she has a productive cough (green sputum) and chest congestion  Gets SOB with activity.  Denies fever.   Cough is still bothersome and keeps her up at night.  She would like to be seen by Dr. Morgan for reassessment  Appointment made for today with Dr. Eddie Hall RN

## 2018-03-05 NOTE — PATIENT INSTRUCTIONS
Try applying Vaseline to the spot on your left shin to help it heal.    You can take Imodium as needed with your diarrhea.    If your breathing and cough worsen let me know.    [facility]    If you have any questions regarding to your visit please contact your care team:     Team Pink:   Clinic Hours Telephone Number   Internal Medicine:  Dr. Adela Yin, NP       7am-7pm  Monday - Thursday   7am-5pm  Fridays  [phone]  (Appointment scheduling available 24/7)    Questions about your visit?  Team Line  [phone]   [facility] [place] [facility]am-9pm Monday-Friday Saturday-Sunday- 9am-5pm   [place] - 5pm-9pm Monday-Friday Saturday-Sunday- 9am-5pm  [phone] - [place]   [phone] - [place]       What options do I have for visits at the clinic other than the traditional office visit?  To expand how we care for you, many of our providers are utilizing electronic visits (e-visits) and telephone visits, when medically appropriate, for interactions with their patients rather than a visit in the clinic.   We also offer nurse visits for many medical concerns. Just like any other service, we will bill your insurance company for this type of visit based on time spent on the phone with your provider. Not all insurance companies cover these visits. Please check with your medical insurance if this type of visit is covered. You will be responsible for any charges that are not paid by your insurance.      E-visits via [employer]:  generally incur a $35.00 fee.  Telephone visits:  Time spent on the phone: *charged based on time that is spent on the phone in increments of 10 minutes. Estimated cost:   5-10 mins $30.00   11-20 mins. $59.00   21-30 mins. $85.00   Use [employer] (secure email communication and access to your chart) to send your primary care provider a message or make an appointment. Ask someone on your Team how to sign up for  Doug.    For a Price Quote for your services, please call our Consumer Price Line at [phone].    As always, Thank you for trusting us with your health care needs!    Discharged by Nataly AGUILAR CMA ([facility])

## 2018-03-05 NOTE — TELEPHONE ENCOUNTER
"Per Corina message below    \"This is johnson Palma's daughter. I usually go with her to her appointments, and I have not been able to go because I have been occupied taking care of my  who has pneumonia.  I have talked to my mom daily, she is very confused,  she has had diarrhea since going on the Tamiflu, I don't think she has taken all doses. She doesn't think she should eat much  and she  has \"never liked\" water, so never drinks enough.  She fell in the night about a week ago, but it's a discrepancy as to WHERE she fell. My dad says he picked her up off the bathroom floor, she says she fell in the bedroom.  Anyway, she is not doing well cognitively, and to put it mildly, she is driving my father crazy.   I trust you will not mention this message to her, but I would appreciate a reply after you see her.   Thanks   Minerva\"  "

## 2018-03-09 ENCOUNTER — TELEPHONE (OUTPATIENT)
Dept: INTERNAL MEDICINE | Facility: CLINIC | Age: 83
End: 2018-03-09

## 2018-03-09 NOTE — TELEPHONE ENCOUNTER
Per patient, she is feeling better.  No longer has a cough. And diarrhea has resolved  But now has a runny nose and nasal congestion  Denies fever or any other symptoms of concern  She is wondering if ok to go out and about    Advised patient that this is fine but should continue to practice good hand hygiene and call or be seen if symptoms worsens or fail to improve  Patient verbalized understanding.  Tania Hall RN

## 2018-03-09 NOTE — TELEPHONE ENCOUNTER
Reason for call:  Patient reporting a symptom    Symptom or request: Mucous/was in on 03/05 for influenza    Duration (how long have symptoms been present): NA    Have you been treated for this before? Still has symptoms    Additional comments: Wants to know next steps    Phone Number patient can be reached at:  Home number on file [phone] (home)    Best Time:  Anytime    Can we leave a detailed message on this number:  YES    Call taken on 3/9/2018 at 10:36 AM by Freida Dickens

## 2018-03-12 ENCOUNTER — TELEPHONE (OUTPATIENT)
Dept: FAMILY MEDICINE | Facility: CLINIC | Age: 83
End: 2018-03-12

## 2018-03-12 NOTE — TELEPHONE ENCOUNTER
Reason for call: med request  Patient called regarding (reason for call): Patient is requesting a med for her sinus infection  Additional comments: Please call if any questions    Phone number to reach patient:  Home number on file [phone] (home)    Best Time:  anytime    Can we leave a detailed message on this number?  YES

## 2018-03-12 NOTE — PROGRESS NOTES
SUBJECTIVE:   Shannan Magdaleno is a 86 year old female who presents to clinic today for the following health issues:      ENT Symptoms             Symptoms: cc Present Absent Comment   Fever/Chills   x    Fatigue  x     Muscle Aches   x    Eye Irritation   x    Sneezing   x    Nasal Abran/Drg  x     Sinus Pressure/Pain  x     Loss of smell   x    Dental pain   x    Sore Throat   x    Swollen Glands   x    Ear Pain/Fullness  x     Cough  x     Wheeze   x    Chest Pain   x    Shortness of breath   x    Rash   x    Other   x      Symptom duration: 3/5/18   Symptom severity:  mederate   Treatments tried:  none   Contacts:  was diagnosed with flu     Patient's nasal congestion started at the end of last week and was more severe yesterday.  Today it is improved.  Patient continues to have mild cough following influenza.  She denies shortness of breath.  She continues to feel weak and her stools are loose following her course of tamiflu.  She notes her appetite is poor.  Patient denies melena, hematochezia.    Problem list and histories reviewed & adjusted, as indicated.  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)     Restless leg syndrome     Hyperlipidemia LDL goal <100     Paroxysmal atrial fibrillation (H)     ACP (advance care planning)     Tremor     CAD (coronary artery disease)     Retinal dot hemorrhage or microaneuysm, os     Osteopenia     Insomnia     OA (osteoarthritis) of knee     Retroperitoneal lymphadenopathy     Spinal stenosis of lumbar region without neurogenic claudication     Lymphoma, small lymphocytic (H)     Long-term (current) use of anticoagulants [Z79.01]     Posterior vitreous detachment, right     Glaucoma suspect, bilateral     Driving safety issue     Pneumonia of left lower lobe due to infectious organism (H)     Anemia, unspecified type      Combined forms of age-related cataract, mild-mod, both eyes     Past Surgical History:   Procedure Laterality Date     APPENDECTOMY       BREAST BIOPSY, RT/LT  1955    X 2 - benign     C ANESTH,REPAIR LO ABD HERNIA NOS  1995     CARDIAC CATHERIZATION  1/99 , 12/04    PTCA - Stent X 1     HC REMOVAL GALLBLADDER         Social History   Substance Use Topics     Smoking status: Never Smoker     Smokeless tobacco: Never Used     Alcohol use No     Family History   Problem Relation Age of Onset     Respiratory Mother      HEART DISEASE Father      Arthritis Sister      Obesity Sister      HEART DISEASE Sister      Hypertension Sister      HEART DISEASE Son      Neurologic Disorder Son      migraines     Arthritis Sister      HEART DISEASE Daughter      HEART DISEASE Daughter      Macular Degeneration Daughter      Neurologic Disorder Daughter      migraines     Neurologic Disorder Daughter      migraines     CANCER Paternal Aunt      Macular Degeneration Daughter      DIABETES No family hx of      CEREBROVASCULAR DISEASE No family hx of      Thyroid Disease No family hx of      Glaucoma No family hx of          Current Outpatient Prescriptions   Medication Sig Dispense Refill     fluticasone (FLONASE) 50 MCG/ACT spray Spray 2 sprays into both nostrils daily 16 g 1     escitalopram (LEXAPRO) 10 MG tablet TAKE 1&1/2 TABLETS (15 MG) BY MOUTH DAILY 135 tablet 1     diazepam (VALIUM) 2 MG tablet TAKE 1/2-1 TABLET BY MOUTH AT BEDTIME FOR ANXIETY 60 tablet 0     nitroGLYcerin (NITROSTAT) 0.4 MG sublingual tablet Place 1 tablet (0.4 mg) under the tongue every 5 minutes as needed for chest pain 25 tablet 3     ASPIRIN NOT PRESCRIBED (INTENTIONAL) Please choose reason not prescribed, below 0 each 0     simvastatin (ZOCOR) 40 MG tablet TAKE 1 TABLET (40 MG) BY MOUTH AT BEDTIME 90 tablet 3     warfarin (COUMADIN) 5 MG tablet TAKE 1 & 1/2 TABLETS ON WED, AND 1 TAB REST OF WEEK 90 tablet 3     metoprolol (LOPRESSOR) 25 MG tablet Take  "1.5 tablets (37.5 mg) by mouth 2 times daily 270 tablet 1     calcium-vitamin D (CALTRATE) 600-400 MG-UNIT per tablet Take 1 tablet by mouth daily       Multiple Vitamins-Minerals (CENTRUM SILVER) per tablet Take 1 tablet by mouth daily Has 2000 IU of Vitamin D in it. 30 tablet      acetaminophen (TYLENOL) 500 MG tablet Take 1-2 tablets by mouth every 6 hours as needed.       Allergies   Allergen Reactions     Codeine Nausea and Vomiting     Latex Itching     Lisinopril      cough     Tamiflu [Oseltamivir] GI Disturbance     Diarrhea and vomiting (would try Relenza)     Atorvastatin Calcium      Myalgias       Sulfa Drugs Nausea and Vomiting     BP Readings from Last 3 Encounters:   03/13/18 112/66   03/05/18 120/56   03/03/18 122/60    Wt Readings from Last 3 Encounters:   03/13/18 146 lb 9.6 oz (66.5 kg)   03/05/18 144 lb (65.3 kg)   03/03/18 148 lb (67.1 kg)                  Labs reviewed in EPIC    Reviewed and updated as needed this visit by clinical staff       Reviewed and updated as needed this visit by Provider         ROS:  Constitutional, HEENT, cardiovascular, pulmonary, gi and gu systems are negative, except as otherwise noted.    OBJECTIVE:     /66  Pulse 70  Temp 97.1  F (36.2  C) (Oral)  Resp 14  Ht 5' 2.75\" (1.594 m)  Wt 146 lb 9.6 oz (66.5 kg)  SpO2 100%  BMI 26.18 kg/m2  Body mass index is 26.18 kg/(m^2).  GENERAL: healthy, alert and no distress  EYES: Eyes grossly normal to inspection, PERRL and conjunctivae and sclerae normal  HENT: normal cephalic/atraumatic, ear canals and TM's normal, nose and mouth without ulcers or lesions, nasal mucosa edematous , oropharynx clear and oral mucous membranes moist  NECK: no adenopathy, no asymmetry, masses, or scars and thyroid normal to palpation  RESP: lungs clear to auscultation - no rales, rhonchi or wheezes  CV: regular rate and rhythm, normal S1 S2, no S3 or S4, no murmur, click or rub, no peripheral edema and peripheral pulses " strong  ABDOMEN: soft, nontender, no hepatosplenomegaly, no masses and bowel sounds normal  MS: no gross musculoskeletal defects noted, no edema    Diagnostic Test Results:  pending    ASSESSMENT/PLAN:     1. Viral URI  I suspect patient had another virus causing her nasal congestion, continued poor appetite, fatigue.  She will try over the counter meds and flonase to help symptoms.  If no improvement by Friday of this week, she will contact clinic for antibiotic prescription.  She was also told to notify clinic for fever, worsening symptoms, worsening cough prior to Friday.  Patient verbalized understanding.   - fluticasone (FLONASE) 50 MCG/ACT spray; Spray 2 sprays into both nostrils daily  Dispense: 16 g; Refill: 1    2. Loose stools  Will rule out infection.  - Clostridium difficile Toxin B PCR; Future  - Enteric Bacteria and Virus Panel by ESPERANZA Stool; Future    FUTURE APPOINTMENTS:       - Follow-up for annual visit or as needed    ANDREW Kaplan CNP  [facility]

## 2018-03-12 NOTE — TELEPHONE ENCOUNTER
Patient was diagnosed with influenza A on 3/1/18.  Treated with Tamiflu but experience diarrhea so med was stopped.  Had a f/u with Dr. Morgan on 3/5/18    Influenza symptoms are better but developed sinus congestion last week. Diarrhea had resolved  Continues to have it this week and also slight pressure in the forehead.  Requesting abx for possible sinus infection    Tania Hall RN

## 2018-03-12 NOTE — TELEPHONE ENCOUNTER
Per Dr. Morgan: need seen. With Dorothy Yin NP is fine    Patient updated.  Appointment made with Dorothy Yin NP for tomorrow    Tania Hall RN

## 2018-03-13 ENCOUNTER — OFFICE VISIT (OUTPATIENT)
Dept: FAMILY MEDICINE | Facility: CLINIC | Age: 83
End: 2018-03-13
Payer: COMMERCIAL

## 2018-03-13 VITALS
SYSTOLIC BLOOD PRESSURE: 112 MMHG | BODY MASS INDEX: 25.98 KG/M2 | DIASTOLIC BLOOD PRESSURE: 66 MMHG | HEIGHT: 63 IN | TEMPERATURE: 97.1 F | RESPIRATION RATE: 14 BRPM | HEART RATE: 70 BPM | WEIGHT: 146.6 LBS | OXYGEN SATURATION: 100 %

## 2018-03-13 DIAGNOSIS — R19.5 LOOSE STOOLS: ICD-10-CM

## 2018-03-13 DIAGNOSIS — J06.9 VIRAL URI: Primary | ICD-10-CM

## 2018-03-13 PROCEDURE — 99213 OFFICE O/P EST LOW 20 MIN: CPT | Performed by: NURSE PRACTITIONER

## 2018-03-13 RX ORDER — FLUTICASONE PROPIONATE 50 MCG
2 SPRAY, SUSPENSION (ML) NASAL DAILY
Qty: 16 G | Refills: 1 | Status: SHIPPED | OUTPATIENT
Start: 2018-03-13 | End: 2019-06-03

## 2018-03-13 ASSESSMENT — PAIN SCALES - GENERAL: PAINLEVEL: MILD PAIN (2)

## 2018-03-13 NOTE — PATIENT INSTRUCTIONS
Okay to use coricidin HBP, dayquil and nyquil HBP for congestion.  Okay to use plain robitussin or mucinex.    [facility]    If you have any questions regarding to your visit please contact your care team:     Team Pink:   Clinic Hours Telephone Number   Internal Medicine:  Dr. Adela Yin, NP       7am-7pm  Monday - Thursday   7am-5pm  Fridays  [phone]  (Appointment scheduling available 24/7)    Questions about your visit?  Team Line  [phone]   Urgent Care - [place] - 11am-9pm Monday-Friday Saturday-Sunday- 9am-5pm   [place] - 5pm-9pm Monday-Friday Saturday-Sunday- 9am-5pm  [phone] - [place]   [phone] - [place]       What options do I have for visits at the clinic other than the traditional office visit?  To expand how we care for you, many of our providers are utilizing electronic visits (e-visits) and telephone visits, when medically appropriate, for interactions with their patients rather than a visit in the clinic.   We also offer nurse visits for many medical concerns. Just like any other service, we will bill your insurance company for this type of visit based on time spent on the phone with your provider. Not all insurance companies cover these visits. Please check with your medical insurance if this type of visit is covered. You will be responsible for any charges that are not paid by your insurance.      E-visits via [employer]:  generally incur a $35.00 fee.  Telephone visits:  Time spent on the phone: *charged based on time that is spent on the phone in increments of 10 minutes. Estimated cost:   5-10 mins $30.00   11-20 mins. $59.00   21-30 mins. $85.00   Use [employer] (secure email communication and access to your chart) to send your primary care provider a message or make an appointment. Ask someone on your Team how to sign up for [employer].    For a Price Quote for your services, please call our  [employer] Line at [phone].    As always, Thank you for trusting us with your health care needs!    Kathryn Bullock CMA

## 2018-03-13 NOTE — MR AVS SNAPSHOT
After Visit Summary   3/13/2018    Shannan Magdaleno    MRN: [id number]           Patient Information     Date Of Birth          12/2/1931        Visit Information        Provider Department      3/13/2018 10:40 AM Dorothy Yin APRN [facility]        Today's Diagnoses     Viral URI    -  1    Loose stools          Care Instructions    Okay to use coricidin HBP, dayquil and nyquil HBP for congestion.  Okay to use plain robitussin or mucinex.    [facility]    If you have any questions regarding to your visit please contact your care team:     Team Pink:   Clinic Hours Telephone Number   Internal Medicine:  Dr. Adela Yin, NP       7am-7pm  Monday - Thursday   7am-5pm  Fridays  [phone]  (Appointment scheduling available 24/7)    Questions about your visit?  Team Line  [phone]   Urgent Care - [place] [place] - 11am-9pm Monday-Friday Saturday-Sunday- 9am-5pm   [place] - 5pm-9pm Monday-Friday Saturday-Sunday- 9am-5pm  [phone] - [place]   [phone] - [place]       What options do I have for visits at the clinic other than the traditional office visit?  To expand how we care for you, many of our providers are utilizing electronic visits (e-visits) and telephone visits, when medically appropriate, for interactions with their patients rather than a visit in the clinic.   We also offer nurse visits for many medical concerns. Just like any other service, we will bill your insurance company for this type of visit based on time spent on the phone with your provider. Not all insurance companies cover these visits. Please check with your medical insurance if this type of visit is covered. You will be responsible for any charges that are not paid by your insurance.      E-visits via [employer]:  generally incur a $35.00 fee.  Telephone visits:  Time spent on the phone: *charged based on time  that is spent on the phone in increments of 10 minutes. Estimated cost:   5-10 mins $30.00   11-20 mins. $59.00   21-30 mins. $85.00   Use [employer]ah[employer] (secure email communication and access to your chart) to send your primary care provider a message or make an appointment. Ask someone on your Team how to sign up for [employer].    For a Price Quote for your services, please call our [employer] Line at [phone].    As always, Thank you for trusting us with your health care needs!    Kathryn Bullock CMA             Follow-ups after your visit        Your next 10 appointments already scheduled     Mar 28, 2018  9:45 AM CDT   Anticoagulation Visit with ABIGAIL ANTI COAG   [facility]    [address] [place] [address]  [place] [id number] [phone]              Future tests that were ordered for you today     Open Future Orders        Priority Expected Expires Ordered    Clostridium difficile Toxin B PCR Routine  4/12/2018 3/13/2018    Enteric Bacteria and Virus Panel by ESPERANZA Stool Routine  3/13/2019 3/13/2018            Who to contact     If you have questions or need follow up information about today's clinic visit or your schedule please contact [facility] directly at [phone].  Normal or non-critical lab and imaging results will be communicated to you by taggahart, letter or phone within 4 business days after the clinic has received the results. If you do not hear from us within 7 days, please contact the clinic through taggahart or phone. If you have a critical or abnormal lab result, we will notify you by phone as soon as possible.  Submit refill requests through [employer] or call your pharmacy and they will forward the refill request to us. Please allow 3 business days for your refill to be completed.          Additional Information About Your Visit        [employer] Information     [employer] gives you secure access to your electronic health record. If you see a primary care  "provider, you can also send messages to your care team and make appointments. If you have questions, please call your primary care clinic.  If you do not have a primary care provider, please call [phone] and they will assist you.        Care EveryWhere ID     This is your Care EveryWhere ID. This could be used by other organizations to access your [place] medical records  [id number]        Your Vitals Were     Pulse Temperature Respirations Height Pulse Oximetry BMI (Body Mass Index)    70 97.1  F (36.2  C) (Oral) 14 5' 2.75\" (1.594 m) 100% 26.18 kg/m2       Blood Pressure from Last 3 Encounters:   03/13/18 112/66   03/05/18 120/56   03/03/18 122/60    Weight from Last 3 Encounters:   03/13/18 146 lb 9.6 oz (66.5 kg)   03/05/18 144 lb (65.3 kg)   03/03/18 148 lb (67.1 kg)                 Today's Medication Changes          These changes are accurate as of 3/13/18 11:32 AM.  If you have any questions, ask your nurse or doctor.               Start taking these medicines.        Dose/Directions    fluticasone 50 MCG/ACT spray   Commonly known as:  FLONASE   Used for:  Viral URI   Started by:  Dorothy Yin APRN CNP        Dose:  2 spray   Spray 2 sprays into both nostrils daily   Quantity:  16 g   Refills:  1            Where to get your medicines      These medications were sent to [id number] - [place], [address] OF [address], [facility] [address]     Phone:  [phone]     fluticasone 50 MCG/ACT spray                Primary Care Provider Office Phone # Fax #    Adela Morgan [phone] [address] [facility] [place] [address]        Equal Access to Services     HENRY ZIEGLER AH: Cari Silva, carol aj, qamaritza vincent, claudia valadez. [facility] [phone].    ATENCIÓN: Si habla español, tiene a linn disposición servicios gratuitos de asistencia " lingüística. Kamla al [phone].    We comply with applicable federal civil rights laws and [place] laws. We do not discriminate on the basis of race, color, national origin, age, disability, sex, sexual orientation, or gender identity.            Thank you!     Thank you for choosing [facility]\""  for your care. Our goal is always to provide you with excellent care. Hearing back from our patients is one way we can continue to improve our services. Please take a few minutes to complete the written survey that you may receive in the mail after your visit with us. Thank you!             Your Updated Medication List - Protect others around you: Learn how to safely use, store and throw away your medicines at www.disposemymeds.org.          This list is accurate as of 3/13/18 11:32 AM.  Always use your most recent med list.                   Brand Name Dispense Instructions for use Diagnosis    ASPIRIN NOT PRESCRIBED    INTENTIONAL    0 each    Please choose reason not prescribed, below    Paroxysmal atrial fibrillation (H)       calcium-vitamin D 600-400 MG-UNIT per tablet    CALTRATE     Take 1 tablet by mouth daily        CENTRUM SILVER per tablet     30 tablet    Take 1 tablet by mouth daily Has 2000 IU of Vitamin D in it.    Osteopenia       diazepam 2 MG tablet    VALIUM    60 tablet    TAKE 1/2-1 TABLET BY MOUTH AT BEDTIME FOR ANXIETY    Anxiety       escitalopram 10 MG tablet    LEXAPRO    135 tablet    TAKE 1&1/2 TABLETS (15 MG) BY MOUTH DAILY    Anxiety       fluticasone 50 MCG/ACT spray    FLONASE    16 g    Spray 2 sprays into both nostrils daily    Viral URI       metoprolol tartrate 25 MG tablet    LOPRESSOR    270 tablet    Take 1.5 tablets (37.5 mg) by mouth 2 times daily    Paroxysmal atrial fibrillation (H)       nitroGLYcerin 0.4 MG sublingual tablet    NITROSTAT    25 tablet    Place 1 tablet (0.4 mg) under the tongue every 5 minutes as needed for chest pain    Coronary artery disease  involving native coronary artery of native heart without angina pectoris       simvastatin 40 MG tablet    ZOCOR    90 tablet    TAKE 1 TABLET (40 MG) BY MOUTH AT BEDTIME    Hyperlipidemia LDL goal <70       TYLENOL 500 MG tablet   Generic drug:  acetaminophen      Take 1-2 tablets by mouth every 6 hours as needed.        warfarin 5 MG tablet    COUMADIN    90 tablet    TAKE 1 & 1/2 TABLETS ON WED, AND 1 TAB REST OF WEEK    Paroxysmal atrial fibrillation (H)

## 2018-03-13 NOTE — Clinical Note
DANITAI.  I'm out of office Friday and she may call for an antibiotic if URI symptoms are not improved.  I think it's a virus she contracted after influenza and am trying to avoid antibiotics if possible.  Dorothy Yin, CNP

## 2018-03-14 ASSESSMENT — PATIENT HEALTH QUESTIONNAIRE - PHQ9: SUM OF ALL RESPONSES TO PHQ QUESTIONS 1-9: 5

## 2018-03-22 ENCOUNTER — TELEPHONE (OUTPATIENT)
Dept: INTERNAL MEDICINE | Facility: CLINIC | Age: 83
End: 2018-03-22

## 2018-03-22 NOTE — TELEPHONE ENCOUNTER
Patient notified of providers message as written.   Patient verbalized understanding and has no further questions or concerns.  Moon Lobato RN - BC

## 2018-03-22 NOTE — TELEPHONE ENCOUNTER
Pt will be having dental work on TBD. Please review pt's history and advise if he/she will need to be bridged with Lovenox.     If so, 1 mg/kg BID or 1.5 mg/kg daily?     If not, OK to stop coumadin 5 days prior and resume usual dose 12-24 hours after procedure?     Please advise.  Moon Lobato, JUAN - BC

## 2018-03-22 NOTE — TELEPHONE ENCOUNTER
Reason for Call:  Other prescription    Detailed comments:  Patient calling. She is having dental work and needs to be off her coumadin. Please call and advise.     dds appt not scheduled yet.     Phone Number Patient can be reached at: Home number on file [phone] (home)    Best Time:  Any     Can we leave a detailed message on this number? YES    Call taken on 3/22/2018 at 11:38 AM by Jihan Avendaño

## 2018-03-28 ENCOUNTER — ANTICOAGULATION THERAPY VISIT (OUTPATIENT)
Dept: NURSING | Facility: CLINIC | Age: 83
End: 2018-03-28
Payer: COMMERCIAL

## 2018-03-28 DIAGNOSIS — Z79.01 LONG-TERM (CURRENT) USE OF ANTICOAGULANTS: ICD-10-CM

## 2018-03-28 DIAGNOSIS — I48.0 PAROXYSMAL ATRIAL FIBRILLATION (H): ICD-10-CM

## 2018-03-28 LAB — INR POINT OF CARE: 1.6 (ref 0.86–1.14)

## 2018-03-28 PROCEDURE — 36416 COLLJ CAPILLARY BLOOD SPEC: CPT

## 2018-03-28 PROCEDURE — 99207 ZZC NO CHARGE NURSE ONLY: CPT

## 2018-03-28 PROCEDURE — 85610 PROTHROMBIN TIME: CPT | Mod: QW

## 2018-03-28 NOTE — MR AVS SNAPSHOT
Shannan TERAN Rasta   3/28/2018 9:45 AM   Anticoagulation Therapy Visit    Description:  86 year old female   Provider:  DARA ANTI COAG   Department:  [facility]           INR as of 3/28/2018     Today's INR 1.6!      Anticoagulation Summary as of 3/28/2018     INR goal 2.0-3.0   Today's INR 1.6!   Full instructions 3/28: 10 mg; Otherwise 5 mg every day   Next INR check 4/11/2018    Indications   Paroxysmal atrial fibrillation (H) [I48.0]  Long-term (current) use of anticoagulants [Z79.01] [Z79.01]         Your next Anticoagulation Clinic appointment(s)     Apr 11, 2018  9:45 AM CDT   Anticoagulation Visit with DARA ANTI COAPARUL   [facility] ([address] [address] [phone]              Contact Numbers     [facility]  Please call [phone] to cancel and/or reschedule your appointment   Please call [phone] with any problems or questions regarding your therapy.        March 2018 Details    Sun Mon Tue Wed Thu Fri Sat         1               2               3                 4               5               6               7               8               9               10                 11               12               13               14               15               16               17                 18               19               20               21               22               23               24                 25               26               27               28      10 mg   See details      29      5 mg         30      5 mg         31      5 mg          Date Details   03/28 This INR check               How to take your warfarin dose     To take:  5 mg Take 1 of the 5 mg tablets.    To take:  10 mg Take 2 of the 5 mg tablets.           April 2018 Details    Sun Mon Tue Wed Thu Fri Sat     1      5 mg         2      5 mg         3      5 mg         4      5 mg         5      5 mg         6      5 mg         7      5 mg            8      5 mg         9      5 mg         10      5 mg         11            12               13               14                 15               16               17               18               19               20               21                 22               23               24               25               26               27               28                 29               30                     Date Details   No additional details    Date of next INR:  4/11/2018         How to take your warfarin dose     To take:  5 mg Take 1 of the 5 mg tablets.

## 2018-03-28 NOTE — PROGRESS NOTES
ANTICOAGULATION FOLLOW-UP CLINIC VISIT    Patient Name:  Shannan Magdaleno  Date:  3/28/2018  Contact Type:  Face to Face    SUBJECTIVE:     Patient Findings     Positives Missed doses, No Problem Findings    Comments Since last visit patient was diagnosed with Influenza A, then a week later a Viral URI. Patient states she is feeling better. Has been eating more greens this week. She also took 2.5 mg one day, states she cannot remember which day, due to having a bloody nose.            OBJECTIVE    INR Protime   Date Value Ref Range Status   03/28/2018 1.6 (A) 0.86 - 1.14 Final       ASSESSMENT / PLAN  INR assessment SUB    Recheck INR In: 2 WEEKS    INR Location Clinic      Anticoagulation Summary as of 3/28/2018     INR goal 2.0-3.0   Today's INR 1.6!   Maintenance plan 5 mg (5 mg x 1) every day   Full instructions 3/28: 10 mg; Otherwise 5 mg every day   Weekly total 35 mg   Plan last modified Moon Lobato RN (11/1/2017)   Next INR check 4/11/2018   Priority INR   Target end date Indefinite    Indications   Paroxysmal atrial fibrillation (H) [I48.0]  Long-term (current) use of anticoagulants [Z79.01] [Z79.01]         Anticoagulation Episode Summary     INR check location     Preferred lab     Send INR reminders to [facility]    Comments       Anticoagulation Care Providers     Provider Role Specialty Phone number    Adela Morgan MD [facility] Internal Medicine [phone]            See the Encounter Report to view Anticoagulation Flowsheet and Dosing Calendar (Go to Encounters tab in chart review, and find the Anticoagulation Therapy Visit)    Dosage adjustment made based on physician directed care plan.    Moon Lobato RN

## 2018-04-09 ENCOUNTER — ANTICOAGULATION THERAPY VISIT (OUTPATIENT)
Dept: NURSING | Facility: CLINIC | Age: 83
End: 2018-04-09
Payer: COMMERCIAL

## 2018-04-09 DIAGNOSIS — Z79.01 LONG-TERM (CURRENT) USE OF ANTICOAGULANTS: ICD-10-CM

## 2018-04-09 DIAGNOSIS — I48.0 PAROXYSMAL ATRIAL FIBRILLATION (H): ICD-10-CM

## 2018-04-09 LAB — INR POINT OF CARE: 2.9 (ref 0.86–1.14)

## 2018-04-09 PROCEDURE — 36416 COLLJ CAPILLARY BLOOD SPEC: CPT

## 2018-04-09 PROCEDURE — 99207 ZZC NO CHARGE NURSE ONLY: CPT

## 2018-04-09 PROCEDURE — 85610 PROTHROMBIN TIME: CPT | Mod: QW

## 2018-04-09 NOTE — MR AVS SNAPSHOT
Shannan TERAN Rasta   4/9/2018 10:30 AM   Anticoagulation Therapy Visit    Description:  86 year old female   Provider:  DARA ANTI COAG   Department:  D[facility] Nurse           INR as of 4/9/2018     Today's INR 2.9      Anticoagulation Summary as of 4/9/2018     INR goal 2.0-3.0   Today's INR 2.9   Full instructions 5 mg every day   Next INR check 4/30/2018    Indications   Paroxysmal atrial fibrillation (H) [I48.0]  Long-term (current) use of anticoagulants [Z79.01] [Z79.01]         Your next Anticoagulation Clinic appointment(s)     Apr 30, 2018  9:15 AM CDT   Anticoagulation Visit with DARA ANTI COAG   [facility] ([facility]    [address] [facility] [address] [phone]              Contact Numbers     [facility]  Please call [phone] to cancel and/or reschedule your appointment   Please call [phone] with any problems or questions regarding your therapy.        April 2018 Details    Sun Mon Tue Wed Thu Fri Sat     1               2               3               4               5               6               7                 8               9      5 mg   See details      10      5 mg         11      5 mg         12      5 mg         13      5 mg         14      5 mg           15      5 mg         16      5 mg         17      5 mg         18      5 mg         19      5 mg         20      5 mg         21      5 mg           22      5 mg         23      5 mg         24      5 mg         25      5 mg         26      5 mg         27      5 mg         28      5 mg           29      5 mg         30                  Date Details   04/09 This INR check       Date of next INR:  4/30/2018         How to take your warfarin dose     To take:  5 mg Take 1 of the 5 mg tablets.

## 2018-04-09 NOTE — PROGRESS NOTES
ANTICOAGULATION FOLLOW-UP CLINIC VISIT    Patient Name:  Shannan Magdaleno  Date:  4/9/2018  Contact Type:  Face to Face    SUBJECTIVE:     Patient Findings     Positives No Problem Findings           OBJECTIVE    INR Protime   Date Value Ref Range Status   04/09/2018 2.9 (A) 0.86 - 1.14 Final       ASSESSMENT / PLAN  INR assessment THER    Recheck INR In: 3 WEEKS    INR Location Clinic      Anticoagulation Summary as of 4/9/2018     INR goal 2.0-3.0   Today's INR 2.9   Maintenance plan 5 mg (5 mg x 1) every day   Full instructions 5 mg every day   Weekly total 35 mg   No change documented Moon Lobato, RN   Plan last modified Moon Lobato RN (11/1/2017)   Next INR check 4/30/2018   Priority INR   Target end date Indefinite    Indications   Paroxysmal atrial fibrillation (H) [I48.0]  Long-term (current) use of anticoagulants [Z79.01] [Z79.01]         Anticoagulation Episode Summary     INR check location     Preferred lab     Send INR reminders to [facility] CL[facility]    Comments       Anticoagulation Care Providers     Provider Role Specialty Phone number    Adela Morgan MD [facility] Internal Medicine [phone]            See the Encounter Report to view Anticoagulation Flowsheet and Dosing Calendar (Go to Encounters tab in chart review, and find the Anticoagulation Therapy Visit)    Dosage adjustment made based on physician directed care plan.    Moon Lobato RN

## 2018-04-19 DIAGNOSIS — F41.9 ANXIETY: ICD-10-CM

## 2018-04-20 RX ORDER — DIAZEPAM 2 MG
TABLET ORAL
Qty: 60 TABLET | Refills: 0 | Status: SHIPPED | OUTPATIENT
Start: 2018-04-20 | End: 2018-09-12

## 2018-04-20 NOTE — TELEPHONE ENCOUNTER
[place] ([place]) reviewed:  not reviewed/not due - last done on 1/15/18    [place]:  [email or web address]    Moon Lobato RN - BC

## 2018-04-23 NOTE — TELEPHONE ENCOUNTER
Valium prescription faxed to [facility] pharmacy at [phone].  Marifer Nieves,

## 2018-04-23 NOTE — PROGRESS NOTES
SUBJECTIVE:   Shannan Magdaleno is a 86 year old female who presents to clinic today for the following health issues:      URINARY TRACT SYMPTOMS  Onset: 2-3 months.    Description:   Painful urination (Dysuria): no   Blood in urine (Hematuria): no   Delay in urine (Hesitency): no     Intensity: moderate    Progression of Symptoms:  same    Accompanying Signs & Symptoms:  Fever/chills: no   Flank pain no   Nausea and vomiting: no   Any vaginal symptoms: none  Abdominal/Pelvic Pain: YES cramping    History:   History of frequent UTI's: no   History of kidney stones: no   Sexually Active: no   Possibility of pregnancy: No    Precipitating factors:   nothing    Therapies Tried and outcome: nothing    Patient notes abdominal pain and cramping, worse at night with lying down.  She denies any constipation or diarrhea.    Patient notes a bruise to her left lower leg.  She has many superficial veins in the area and bumped it getting into her daughter's van a week ago.  She denies pain.  She wonders what she can do for it.    Problem list and histories reviewed & adjusted, as indicated.  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)     Restless leg syndrome     Hyperlipidemia LDL goal <100     Paroxysmal atrial fibrillation (H)     ACP (advance care planning)     Tremor     CAD (coronary artery disease)     Retinal dot hemorrhage or microaneuysm, os     Osteopenia     Insomnia     OA (osteoarthritis) of knee     Retroperitoneal lymphadenopathy     Spinal stenosis of lumbar region without neurogenic claudication     Lymphoma, small lymphocytic (H)     Long-term (current) use of anticoagulants [Z79.01]     Posterior vitreous detachment, right     Glaucoma suspect, bilateral     Driving safety issue     Pneumonia of left lower lobe due to infectious organism (H)     Anemia, unspecified  type     Combined forms of age-related cataract, mild-mod, both eyes     Past Surgical History:   Procedure Laterality Date     APPENDECTOMY       BREAST BIOPSY, RT/LT  1955    X 2 - benign     C ANESTH,REPAIR LO ABD HERNIA NOS  1995     CARDIAC CATHERIZATION  1/99 , 12/04    PTCA - Stent X 1     HC REMOVAL GALLBLADDER         Social History   Substance Use Topics     Smoking status: Never Smoker     Smokeless tobacco: Never Used     Alcohol use No     Family History   Problem Relation Age of Onset     Respiratory Mother      HEART DISEASE Father      Arthritis Sister      Obesity Sister      HEART DISEASE Sister      Hypertension Sister      HEART DISEASE Son      Neurologic Disorder Son      migraines     Arthritis Sister      HEART DISEASE Daughter      HEART DISEASE Daughter      Macular Degeneration Daughter      Neurologic Disorder Daughter      migraines     Neurologic Disorder Daughter      migraines     CANCER Paternal Aunt      Macular Degeneration Daughter      DIABETES No family hx of      CEREBROVASCULAR DISEASE No family hx of      Thyroid Disease No family hx of      Glaucoma No family hx of          Current Outpatient Prescriptions   Medication Sig Dispense Refill     acetaminophen (TYLENOL) 500 MG tablet Take 1-2 tablets by mouth every 6 hours as needed.       ASPIRIN NOT PRESCRIBED (INTENTIONAL) Please choose reason not prescribed, below 0 each 0     calcium-vitamin D (CALTRATE) 600-400 MG-UNIT per tablet Take 1 tablet by mouth daily       diazepam (VALIUM) 2 MG tablet TAKE 1/2 TO 1 TABLET AT BEDTIME AS NEEDED FOR ANXIETY 60 tablet 0     escitalopram (LEXAPRO) 10 MG tablet TAKE 1&1/2 TABLETS (15 MG) BY MOUTH DAILY 135 tablet 1     fluticasone (FLONASE) 50 MCG/ACT spray Spray 2 sprays into both nostrils daily 16 g 1     metoprolol tartrate (LOPRESSOR) 25 MG tablet TAKE 1.5 TABLETS (37.5 MG) BY MOUTH 2 TIMES DAILY 270 tablet 1     Multiple Vitamins-Minerals (CENTRUM SILVER) per tablet Take 1 tablet  "by mouth daily Has 2000 IU of Vitamin D in it. 30 tablet      nitroGLYcerin (NITROSTAT) 0.4 MG sublingual tablet Place 1 tablet (0.4 mg) under the tongue every 5 minutes as needed for chest pain 25 tablet 3     simvastatin (ZOCOR) 40 MG tablet TAKE 1 TABLET (40 MG) BY MOUTH AT BEDTIME 90 tablet 3     warfarin (COUMADIN) 5 MG tablet TAKE 1 & 1/2 TABLETS ON WED, AND 1 TAB REST OF WEEK 90 tablet 3     Allergies   Allergen Reactions     Codeine Nausea and Vomiting     Latex Itching     Lisinopril      cough     Tamiflu [Oseltamivir] GI Disturbance     Diarrhea and vomiting (would try Relenza)     Atorvastatin Calcium      Myalgias       Sulfa Drugs Nausea and Vomiting     BP Readings from Last 3 Encounters:   04/26/18 120/60   03/13/18 112/66   03/05/18 120/56    Wt Readings from Last 3 Encounters:   04/26/18 145 lb 6.4 oz (66 kg)   03/13/18 146 lb 9.6 oz (66.5 kg)   03/05/18 144 lb (65.3 kg)                  Labs reviewed in EPIC    Reviewed and updated as needed this visit by clinical staff       Reviewed and updated as needed this visit by Provider         ROS:  Constitutional, HEENT, cardiovascular, pulmonary, gi and gu systems are negative, except as otherwise noted.    OBJECTIVE:     /60  Pulse 63  Temp 97.2  F (36.2  C) (Oral)  Resp 12  Ht 5' 2.75\" (1.594 m)  Wt 145 lb 6.4 oz (66 kg)  SpO2 98%  BMI 25.96 kg/m2  Body mass index is 25.96 kg/(m^2).  GENERAL: healthy, alert and no distress  RESP: lungs clear to auscultation - no rales, rhonchi or wheezes  CV: regular rate and rhythm, normal S1 S2, no S3 or S4, no murmur, click or rub, no peripheral edema and peripheral pulses strong  ABDOMEN: tenderness suprapubic, no organomegaly or masses, bowel sounds normal and no palpable or pulsatile masses  MS: no gross musculoskeletal defects noted, no edema  Skin: ecchymosis noted to left shin    Diagnostic Test Results:  Results for orders placed or performed in visit on 04/26/18 (from the past 24 hour(s)) "   *UA reflex to Microscopic and Culture ([place] [facility] (except [place] and [place])   Result Value Ref Range    Color Urine Yellow     Appearance Urine Clear     Glucose Urine Negative NEG^Negative mg/dL    Bilirubin Urine Negative NEG^Negative    Ketones Urine Negative NEG^Negative mg/dL    Specific Gravity Urine 1.025 1.003 - 1.035    Blood Urine Moderate (A) NEG^Negative    pH Urine 6.0 5.0 - 7.0 pH    Protein Albumin Urine Negative NEG^Negative mg/dL    Urobilinogen Urine 0.2 0.2 - 1.0 EU/dL    Nitrite Urine Negative NEG^Negative    Leukocyte Esterase Urine Negative NEG^Negative    Source Midstream Urine    Urine Microscopic   Result Value Ref Range    WBC Urine 5-10 (A) OTO5^0 - 5 /HPF    RBC Urine O - 2 OTO2^O - 2 /HPF    Squamous Epithelial /LPF Urine Few FEW^Few /LPF       ASSESSMENT/PLAN:     1. Pelvic pain in female  Will send urine for culture and if negative consider ultrasound in the future.  - *UA reflex to Microscopic and Culture ([facility] (except [place] and [place])  - Urine Microscopic    2. Abnormal urine finding    - Urine Culture Aerobic Bacterial    3. Contusion of left lower leg, initial encounter  Patient to apply heat to area to help ecchymosis reabsorb.  I did  her that this may take several weeks to resolve.      FUTURE APPOINTMENTS:       - Follow-up for annual visit or as needed    ANDREW Kaplan [facility] LONNIE

## 2018-04-26 ENCOUNTER — OFFICE VISIT (OUTPATIENT)
Dept: FAMILY MEDICINE | Facility: CLINIC | Age: 83
End: 2018-04-26
Payer: COMMERCIAL

## 2018-04-26 VITALS
HEIGHT: 63 IN | SYSTOLIC BLOOD PRESSURE: 120 MMHG | HEART RATE: 63 BPM | RESPIRATION RATE: 12 BRPM | OXYGEN SATURATION: 98 % | DIASTOLIC BLOOD PRESSURE: 60 MMHG | WEIGHT: 145.4 LBS | TEMPERATURE: 97.2 F | BODY MASS INDEX: 25.76 KG/M2

## 2018-04-26 DIAGNOSIS — R10.2 PELVIC PAIN IN FEMALE: Primary | ICD-10-CM

## 2018-04-26 DIAGNOSIS — S80.12XA CONTUSION OF LEFT LOWER LEG, INITIAL ENCOUNTER: ICD-10-CM

## 2018-04-26 DIAGNOSIS — R82.90 ABNORMAL URINE FINDING: ICD-10-CM

## 2018-04-26 LAB
ALBUMIN UR-MCNC: NEGATIVE MG/DL
APPEARANCE UR: CLEAR
BILIRUB UR QL STRIP: NEGATIVE
COLOR UR AUTO: YELLOW
GLUCOSE UR STRIP-MCNC: NEGATIVE MG/DL
HGB UR QL STRIP: ABNORMAL
KETONES UR STRIP-MCNC: NEGATIVE MG/DL
LEUKOCYTE ESTERASE UR QL STRIP: NEGATIVE
NITRATE UR QL: NEGATIVE
NON-SQ EPI CELLS #/AREA URNS LPF: ABNORMAL /LPF
PH UR STRIP: 6 PH (ref 5–7)
RBC #/AREA URNS AUTO: ABNORMAL /HPF
SOURCE: ABNORMAL
SP GR UR STRIP: 1.02 (ref 1–1.03)
UROBILINOGEN UR STRIP-ACNC: 0.2 EU/DL (ref 0.2–1)
WBC #/AREA URNS AUTO: ABNORMAL /HPF

## 2018-04-26 PROCEDURE — 81001 URINALYSIS AUTO W/SCOPE: CPT | Performed by: NURSE PRACTITIONER

## 2018-04-26 PROCEDURE — 87086 URINE CULTURE/COLONY COUNT: CPT | Performed by: NURSE PRACTITIONER

## 2018-04-26 PROCEDURE — 99213 OFFICE O/P EST LOW 20 MIN: CPT | Performed by: NURSE PRACTITIONER

## 2018-04-26 ASSESSMENT — PAIN SCALES - GENERAL: PAINLEVEL: MODERATE PAIN (5)

## 2018-04-26 NOTE — PATIENT INSTRUCTIONS
[facility]    If you have any questions regarding to your visit please contact your care team:     Team Pink:   Clinic Hours Telephone Number   Internal Medicine:  Dr. Adela Yin NP       7am-7pm  Monday - Thursday   7am-5pm  Fridays  [phone]  (Appointment scheduling available 24/7)    Questions about your recent visit?  Team Line  [phone]   Urgent Care - [place] - 11am-9pm Monday-Friday Saturday-Sunday- 9am-5pm   [place] - 5pm-9pm Monday-Friday Saturday-Sunday- 9am-5pm  [phone] - [place]  [phone] - [place]       What options do I have for a visit other than an office visit? We offer electronic visits (e-visits) and telephone visits, when medically appropriate.  Please check with your medical insurance to see if these types of visits are covered, as you will be responsible for any charges that are not paid by your insurance.      You can use ReplyBuy (secure electronic communication) to access to your chart, send your primary care provider a message, or make an appointment. Ask a team member how to get started.     For a price quote for your services, please call our Consumer Price Line at [phone] or our Imaging Cost estimation line at [phone] (for imaging tests).    Kathryn Bullock CMA

## 2018-04-26 NOTE — MR AVS SNAPSHOT
After Visit Summary   4/26/2018    Shannan Magdaleno    MRN: [id number]           Patient Information     Date Of Birth          12/2/1931        Visit Information        Provider Department      4/26/2018 10:40 AM Dorothy Yin APRN [facility]        Today's Diagnoses     Urinary retention    -  1    Abnormal urine finding        Contusion of left lower leg, initial encounter          Care Instructions    [facility] [address] [facility]    If you have any questions regarding to your visit please contact your care team:     Team Pink:   Clinic Hours Telephone Number   Internal Medicine:  Dr. Adela Yin, NP       7am-7pm  Monday - Thursday   7am-5pm  Fridays  [phone]  (Appointment scheduling available 24/7)    Questions about your recent visit?  Team Line  [phone]   Urgent Care - [place] and [facility] - 11am-9pm Monday-Friday Saturday-Sunday- 9am-5pm   [place] - 5pm-9pm Monday-Friday Saturday-Sunday- 9am-5pm  [phone] - [place]  [phone] - [place]       What options do I have for a visit other than an office visit? We offer electronic visits (e-visits) and telephone visits, when medically appropriate.  Please check with your medical insurance to see if these types of visits are covered, as you will be responsible for any charges that are not paid by your insurance.      You can use TrackVia (secure electronic communication) to access to your chart, send your primary care provider a message, or make an appointment. Ask a team member how to get started.     For a price quote for your services, please call our Consumer Price Line at [phone] or our Imaging Cost estimation line at [phone] (for imaging tests).    Kathryn Bullock CMA             Follow-ups after your visit        Your next 10 appointments already scheduled     Apr 30, 2018  9:15 AM CDT   Anticoagulation Visit with FZ ANTI COAG  "  [facility] ([facility])    [address]  [place] [address] [phone]              Who to contact     If you have questions or need follow up information about today's clinic visit or your schedule please contact [facility] directly at [phone].  Normal or non-critical lab and imaging results will be communicated to you by MyChart, letter or phone within 4 business days after the clinic has received the results. If you do not hear from us within 7 days, please contact the clinic through [email or web address] or phone. If you have a critical or abnormal lab result, we will notify you by phone as soon as possible.  Submit refill requests through mNectar or call your pharmacy and they will forward the refill request to us. Please allow 3 business days for your refill to be completed.          Additional Information About Your Visit        [email or web address] Information     mNectar gives you secure access to your electronic health record. If you see a primary care provider, you can also send messages to your care team and make appointments. If you have questions, please call your primary care clinic.  If you do not have a primary care provider, please call [phone] and they will assist you.        Care EveryWhere ID     This is your Care EveryWhere ID. This could be used by other organizations to access your [facility] medical records  [id number]        Your Vitals Were     Pulse Temperature Respirations Height Pulse Oximetry BMI (Body Mass Index)    63 97.2  F (36.2  C) (Oral) 12 5' 2.75\" (1.594 m) 98% 25.96 kg/m2       Blood Pressure from Last 3 Encounters:   04/26/18 120/60   03/13/18 112/66   03/05/18 120/56    Weight from Last 3 Encounters:   04/26/18 145 lb 6.4 oz (66 kg)   03/13/18 146 lb 9.6 oz (66.5 kg)   03/05/18 144 lb (65.3 kg)              We Performed the Following     *UA reflex to Microscopic and Culture ([place] and [facility] (except [place] and " [place])     Urine Culture Aerobic Bacterial     Urine Microscopic        Primary Care Provider Office Phone # Fax #    Adela Morgan [phone] [address] [address]        Equal Access to Services     BRYCEANSHU TONEY : Hadii aad ku hadasho Soomaali, waaxda luqadaha, qaybta kaalmada adeegyada, claudia batistan aderené huang lacucoclara valadez. So [facility] [phone].    ATENCIÓN: Si habla español, tiene a linn disposición servicios gratuitos de asistencia lingüística. Llame al [phone].    We comply with applicable federal civil rights laws and [place] laws. We do not discriminate on the basis of race, color, national origin, age, disability, sex, sexual orientation, or gender identity.            Thank you!     Thank you for choosing [facility]  for your care. Our goal is always to provide you with excellent care. Hearing back from our patients is one way we can continue to improve our services. Please take a few minutes to complete the written survey that you may receive in the mail after your visit with us. Thank you!             Your Updated Medication List - Protect others around you: Learn how to safely use, store and throw away your medicines at www.disposemymeds.org.          This list is accurate as of 4/26/18 11:13 AM.  Always use your most recent med list.                   Brand Name Dispense Instructions for use Diagnosis    ASPIRIN NOT PRESCRIBED    INTENTIONAL    0 each    Please choose reason not prescribed, below    Paroxysmal atrial fibrillation (H)       calcium-vitamin D 600-400 MG-UNIT per tablet    CALTRATE     Take 1 tablet by mouth daily        CENTRUM SILVER per tablet     30 tablet    Take 1 tablet by mouth daily Has 2000 IU of Vitamin D in it.    Osteopenia       diazepam 2 MG tablet    VALIUM    60 tablet    TAKE 1/2 TO 1 TABLET AT BEDTIME AS NEEDED FOR ANXIETY    Anxiety       escitalopram 10 MG tablet    LEXAPRO    135 tablet    TAKE  1&1/2 TABLETS (15 MG) BY MOUTH DAILY    Anxiety       fluticasone 50 MCG/ACT spray    FLONASE    16 g    Spray 2 sprays into both nostrils daily    Viral URI       metoprolol tartrate 25 MG tablet    LOPRESSOR    270 tablet    TAKE 1.5 TABLETS (37.5 MG) BY MOUTH 2 TIMES DAILY    Paroxysmal atrial fibrillation (H)       nitroGLYcerin 0.4 MG sublingual tablet    NITROSTAT    25 tablet    Place 1 tablet (0.4 mg) under the tongue every 5 minutes as needed for chest pain    Coronary artery disease involving native coronary artery of native heart without angina pectoris       simvastatin 40 MG tablet    ZOCOR    90 tablet    TAKE 1 TABLET (40 MG) BY MOUTH AT BEDTIME    Hyperlipidemia LDL goal <70       TYLENOL 500 MG tablet   Generic drug:  acetaminophen      Take 1-2 tablets by mouth every 6 hours as needed.        warfarin 5 MG tablet    COUMADIN    90 tablet    TAKE 1 & 1/2 TABLETS ON WED, AND 1 TAB REST OF WEEK    Paroxysmal atrial fibrillation (H)

## 2018-04-27 ENCOUNTER — TELEPHONE (OUTPATIENT)
Dept: INTERNAL MEDICINE | Facility: CLINIC | Age: 83
End: 2018-04-27

## 2018-04-27 LAB
BACTERIA SPEC CULT: NO GROWTH
SPECIMEN SOURCE: NORMAL

## 2018-04-27 NOTE — TELEPHONE ENCOUNTER
Patient notified of Provider's message as written.  Imaging number given    Patient verbalized understanding.  Tania Hall RN

## 2018-04-27 NOTE — TELEPHONE ENCOUNTER
Notes Recorded by Dorothy Yin APRN CNP on 4/27/2018 at 9:07 AM  Please call patient-    Her urine culture did not show any bacterial growth.  I would like her to have a transvaginal pelvic ultrasound to evaluate her pain further (indication pelvic pain).    Thanks,  Dorothy Yin CNP    US order placed.  Voice mail left for patient to call RN hotline at [phone].    Yuridia Myles RN

## 2018-04-28 ENCOUNTER — MYC MEDICAL ADVICE (OUTPATIENT)
Dept: INTERNAL MEDICINE | Facility: CLINIC | Age: 83
End: 2018-04-28

## 2018-05-01 ENCOUNTER — RADIANT APPOINTMENT (OUTPATIENT)
Dept: ULTRASOUND IMAGING | Facility: CLINIC | Age: 83
End: 2018-05-01
Attending: NURSE PRACTITIONER
Payer: COMMERCIAL

## 2018-05-01 ENCOUNTER — ANTICOAGULATION THERAPY VISIT (OUTPATIENT)
Dept: NURSING | Facility: CLINIC | Age: 83
End: 2018-05-01
Payer: COMMERCIAL

## 2018-05-01 DIAGNOSIS — I48.0 PAROXYSMAL ATRIAL FIBRILLATION (H): ICD-10-CM

## 2018-05-01 DIAGNOSIS — Z79.01 LONG-TERM (CURRENT) USE OF ANTICOAGULANTS: ICD-10-CM

## 2018-05-01 DIAGNOSIS — R10.2 PELVIC PAIN IN FEMALE: ICD-10-CM

## 2018-05-01 LAB — INR POINT OF CARE: 2.7 (ref 0.86–1.14)

## 2018-05-01 PROCEDURE — 76830 TRANSVAGINAL US NON-OB: CPT

## 2018-05-01 PROCEDURE — 36416 COLLJ CAPILLARY BLOOD SPEC: CPT

## 2018-05-01 PROCEDURE — 85610 PROTHROMBIN TIME: CPT | Mod: QW

## 2018-05-01 PROCEDURE — 99207 ZZC NO CHARGE NURSE ONLY: CPT

## 2018-05-01 PROCEDURE — 76856 US EXAM PELVIC COMPLETE: CPT

## 2018-05-01 NOTE — PROGRESS NOTES
ANTICOAGULATION FOLLOW-UP CLINIC VISIT    Patient Name:  Shannan Magdaleno  Date:  5/1/2018  Contact Type:  Face to Face    SUBJECTIVE:     Patient Findings     Positives No Problem Findings           OBJECTIVE    INR Protime   Date Value Ref Range Status   05/01/2018 2.7 (A) 0.86 - 1.14 Final       ASSESSMENT / PLAN  INR assessment THER    Recheck INR In: 4 WEEKS    INR Location Clinic      Anticoagulation Summary as of 5/1/2018     INR goal 2.0-3.0   Today's INR 2.7   Maintenance plan 5 mg (5 mg x 1) every day   Full instructions 5 mg every day   Weekly total 35 mg   No change documented Nataly Tolentino RN   Plan last modified Moon Lobato RN (11/1/2017)   Next INR check 5/29/2018   Priority INR   Target end date Indefinite    Indications   Paroxysmal atrial fibrillation (H) [I48.0]  Long-term (current) use of anticoagulants [Z79.01] [Z79.01]         Anticoagulation Episode Summary     INR check location     Preferred lab     Send INR reminders to [facility] CLINIC    Comments       Anticoagulation Care Providers     Provider Role Specialty Phone number    Adela Morgan MD [facility] Internal Medicine [phone]            See the Encounter Report to view Anticoagulation Flowsheet and Dosing Calendar (Go to Encounters tab in chart review, and find the Anticoagulation Therapy Visit)    Dosage adjustment made based on physician directed care plan.    Nataly Tolentino RN

## 2018-05-01 NOTE — MR AVS SNAPSHOT
Shannan Churchillcheco   5/1/2018 12:15 PM   Anticoagulation Therapy Visit    Description:  86 year old female   Provider:  DARA ANTI COAG   Department:  Dara Nurse           INR as of 5/1/2018     Today's INR 2.7      Anticoagulation Summary as of 5/1/2018     INR goal 2.0-3.0   Today's INR 2.7   Full instructions 5 mg every day   Next INR check 5/29/2018    Indications   Paroxysmal atrial fibrillation (H) [I48.0]  Long-term (current) use of anticoagulants [Z79.01] [Z79.01]         Your next Anticoagulation Clinic appointment(s)     May 01, 2018 12:15 PM CDT   Anticoagulation Visit with DARA ANTI COAG   [facility] ([address] [address] [phone]            May 29, 2018  9:45 AM CDT   Anticoagulation Visit with DARA ANTI COAG   [facility] ([address] [address] [phone]              Contact Numbers     [facility]  Please call [phone] to cancel and/or reschedule your appointment   Please call [phone] with any problems or questions regarding your therapy.        May 2018 Details    Sun Mon Tue Wed Thu Fri Sat       1      5 mg   See details      2      5 mg         3      5 mg         4      5 mg         5      5 mg           6      5 mg         7      5 mg         8      5 mg         9      5 mg         10      5 mg         11      5 mg         12      5 mg           13      5 mg         14      5 mg         15      5 mg         16      5 mg         17      5 mg         18      5 mg         19      5 mg           20      5 mg         21      5 mg         22      5 mg         23      5 mg         24      5 mg         25      5 mg         26      5 mg           27      5 mg         28      5 mg         29            30               31                  Date Details   05/01 This INR check       Date of next INR:  5/29/2018         How to take your warfarin dose     To take:   5 mg Take 1 of the 5 mg tablets.

## 2018-06-04 ENCOUNTER — ANTICOAGULATION THERAPY VISIT (OUTPATIENT)
Dept: NURSING | Facility: CLINIC | Age: 83
End: 2018-06-04
Payer: COMMERCIAL

## 2018-06-04 DIAGNOSIS — I48.0 PAROXYSMAL ATRIAL FIBRILLATION (H): ICD-10-CM

## 2018-06-04 DIAGNOSIS — Z79.01 LONG-TERM (CURRENT) USE OF ANTICOAGULANTS: ICD-10-CM

## 2018-06-04 LAB — INR POINT OF CARE: 3.2 (ref 0.86–1.14)

## 2018-06-04 PROCEDURE — 99207 ZZC NO CHARGE NURSE ONLY: CPT

## 2018-06-04 PROCEDURE — 85610 PROTHROMBIN TIME: CPT | Mod: QW

## 2018-06-04 PROCEDURE — 36416 COLLJ CAPILLARY BLOOD SPEC: CPT

## 2018-06-04 NOTE — PROGRESS NOTES
ANTICOAGULATION FOLLOW-UP CLINIC VISIT    Patient Name:  Shannan Magdaleno  Date:  6/4/2018  Contact Type:  Face to Face    SUBJECTIVE:     Patient Findings     Positives No Problem Findings           OBJECTIVE    INR Protime   Date Value Ref Range Status   06/04/2018 3.2 (A) 0.86 - 1.14 Final       ASSESSMENT / PLAN  INR assessment SUPRA    Recheck INR In: 6 WEEKS    INR Location Clinic      Anticoagulation Summary as of 6/4/2018     INR goal 2.0-3.0   Today's INR 3.2!   Warfarin maintenance plan 5 mg (5 mg x 1) every day   Full warfarin instructions 5 mg every day   Weekly warfarin total 35 mg   No change documented Moon Lobato RN   Plan last modified Moon Lobato RN (11/1/2017)   Next INR check 7/16/2018   Priority INR   Target end date Indefinite    Indications   Paroxysmal atrial fibrillation (H) [I48.0]  Long-term (current) use of anticoagulants [Z79.01] [Z79.01]         Anticoagulation Episode Summary     INR check location     Preferred lab     Send INR reminders to [facility]    Comments       Anticoagulation Care Providers     Provider Role Specialty Phone number    Adela Morgan MD [facility] Internal Medicine [phone]            See the Encounter Report to view Anticoagulation Flowsheet and Dosing Calendar (Go to Encounters tab in chart review, and find the Anticoagulation Therapy Visit)    Dosage adjustment made based on physician directed care plan.    Moon Lobato RN

## 2018-07-16 ENCOUNTER — TELEPHONE (OUTPATIENT)
Dept: NURSING | Facility: CLINIC | Age: 83
End: 2018-07-16

## 2018-07-16 ENCOUNTER — ANTICOAGULATION THERAPY VISIT (OUTPATIENT)
Dept: NURSING | Facility: CLINIC | Age: 83
End: 2018-07-16
Payer: COMMERCIAL

## 2018-07-16 DIAGNOSIS — I48.0 PAROXYSMAL ATRIAL FIBRILLATION (H): Primary | ICD-10-CM

## 2018-07-16 DIAGNOSIS — I48.0 PAROXYSMAL ATRIAL FIBRILLATION (H): ICD-10-CM

## 2018-07-16 DIAGNOSIS — Z79.01 LONG-TERM (CURRENT) USE OF ANTICOAGULANTS: ICD-10-CM

## 2018-07-16 LAB — INR POINT OF CARE: 3.6 (ref 0.86–1.14)

## 2018-07-16 PROCEDURE — 99207 ZZC NO CHARGE NURSE ONLY: CPT

## 2018-07-16 PROCEDURE — 85610 PROTHROMBIN TIME: CPT | Mod: QW

## 2018-07-16 PROCEDURE — 36416 COLLJ CAPILLARY BLOOD SPEC: CPT

## 2018-07-16 NOTE — TELEPHONE ENCOUNTER
Has the patient previously taken warfarin? yes  If yes, for what indication? Atrial Fibrillation    Does the patient have any of the following indications for a higher range of 2.5-3.5:    Mitral position mechanical valve? no    Reny-Shiley, Ball and Cage or Monoleaflet valve (regardless of position) no    Other (if yes, please explain) no    Annual INR referral needed.  Orders teed up.    Moon Lobato RN - [facility]

## 2018-07-16 NOTE — MR AVS SNAPSHOT
Shannan TERAN Rasta   7/16/2018 9:45 AM   Anticoagulation Therapy Visit    Description:  86 year old female   Provider:  DARA ANTI COAG   Department:  [facility]           INR as of 7/16/2018     Today's INR 3.6!      Anticoagulation Summary as of 7/16/2018     INR goal 2.0-3.0   Today's INR 3.6!   Full warfarin instructions 7/16: Hold; Otherwise 5 mg every day   Next INR check 7/30/2018    Indications   Paroxysmal atrial fibrillation (H) [I48.0]  Long-term (current) use of anticoagulants [Z79.01] [Z79.01]         Your next Anticoagulation Clinic appointment(s)     Jul 16, 2018  9:45 AM CDT   Anticoagulation Visit with  ANTI COAG   [facility] ([address] [id number] [phone]            Jul 30, 2018  9:45 AM CDT   Anticoagulation Visit with DARA ANTI COAG   [facility] ([address] [address] [phone]              Contact Numbers     [facility]  Please call [phone] to cancel and/or reschedule your appointment   Please call [phone] with any problems or questions regarding your therapy.        July 2018 Details    Sun Mon Tue Wed Thu Fri Sat     1               2               3               4               5               6               7                 8               9               10               11               12               13               14                 15               16      Hold   See details      17      5 mg         18      5 mg         19      5 mg         20      5 mg         21      5 mg           22      5 mg         23      5 mg         24      5 mg         25      5 mg         26      5 mg         27      5 mg         28      5 mg           29      5 mg         30            31                    Date Details   07/16 This INR check       Date of next INR:  7/30/2018         How to take your warfarin dose     To take:  5 mg Take 1 of the  5 mg tablets.    Hold Do not take your warfarin dose. See the Details table to the right for additional instructions.

## 2018-07-16 NOTE — PROGRESS NOTES
ANTICOAGULATION FOLLOW-UP CLINIC VISIT    Patient Name:  Shannan Magdaleno  Date:  7/16/2018  Contact Type:  Face to Face    SUBJECTIVE:     Patient Findings     Positives No Problem Findings, Unexplained INR or factor level change           OBJECTIVE    INR Protime   Date Value Ref Range Status   07/16/2018 3.6 (A) 0.86 - 1.14 Final       ASSESSMENT / PLAN  INR assessment SUPRA    Recheck INR In: 2 WEEKS    INR Location Clinic      Anticoagulation Summary as of 7/16/2018     INR goal 2.0-3.0   Today's INR 3.6!   Warfarin maintenance plan 5 mg (5 mg x 1) every day   Full warfarin instructions 7/16: Hold; Otherwise 5 mg every day   Weekly warfarin total 35 mg   Plan last modified Moon Lobato, RN (11/1/2017)   Next INR check 7/30/2018   Priority INR   Target end date Indefinite    Indications   Paroxysmal atrial fibrillation (H) [I48.0]  Long-term (current) use of anticoagulants [Z79.01] [Z79.01]         Anticoagulation Episode Summary     INR check location     Preferred lab     Send INR reminders to [facility]    Comments       Anticoagulation Care Providers     Provider Role Specialty Phone number    Adela Morgan MD [facility] Internal Medicine [phone]            See the Encounter Report to view Anticoagulation Flowsheet and Dosing Calendar (Go to Encounters tab in chart review, and find the Anticoagulation Therapy Visit)    Dosage adjustment made based on physician directed care plan.    Moon Lobato, RN

## 2018-07-30 ENCOUNTER — ANTICOAGULATION THERAPY VISIT (OUTPATIENT)
Dept: NURSING | Facility: CLINIC | Age: 83
End: 2018-07-30
Payer: COMMERCIAL

## 2018-07-30 DIAGNOSIS — I48.0 PAROXYSMAL ATRIAL FIBRILLATION (H): ICD-10-CM

## 2018-07-30 DIAGNOSIS — Z79.01 LONG-TERM (CURRENT) USE OF ANTICOAGULANTS: ICD-10-CM

## 2018-07-30 LAB — INR POINT OF CARE: 2.7 (ref 0.86–1.14)

## 2018-07-30 PROCEDURE — 99207 ZZC NO CHARGE NURSE ONLY: CPT

## 2018-07-30 PROCEDURE — 85610 PROTHROMBIN TIME: CPT | Mod: QW

## 2018-07-30 PROCEDURE — 36416 COLLJ CAPILLARY BLOOD SPEC: CPT

## 2018-07-30 NOTE — PROGRESS NOTES
ANTICOAGULATION FOLLOW-UP CLINIC VISIT    Patient Name:  Shannan Magdaleno  Date:  7/30/2018  Contact Type:  Face to Face    SUBJECTIVE:     Patient Findings     Positives No Problem Findings           OBJECTIVE    INR Protime   Date Value Ref Range Status   07/30/2018 2.7 (A) 0.86 - 1.14 Final       ASSESSMENT / PLAN  INR assessment THER    Recheck INR In: 4 WEEKS    INR Location Clinic      Anticoagulation Summary as of 7/30/2018     INR goal 2.0-3.0   Today's INR 2.7   Warfarin maintenance plan 5 mg (5 mg x 1) every day   Full warfarin instructions 5 mg every day   Weekly warfarin total 35 mg   No change documented Moon Lobato RN   Plan last modified Moon Lobato RN (11/1/2017)   Next INR check 8/27/2018   Priority INR   Target end date Indefinite    Indications   Paroxysmal atrial fibrillation (H) [I48.0]  Long-term (current) use of anticoagulants [Z79.01] [Z79.01]         Anticoagulation Episode Summary     INR check location     Preferred lab     Send INR reminders to [facility]    Comments       Anticoagulation Care Providers     Provider Role Specialty Phone number    Adela Morgan MD [facility] Internal Medicine [phone]            See the Encounter Report to view Anticoagulation Flowsheet and Dosing Calendar (Go to Encounters tab in chart review, and find the Anticoagulation Therapy Visit)    Dosage adjustment made based on physician directed care plan.    Moon Lobato RN

## 2018-07-30 NOTE — MR AVS SNAPSHOT
Shannan TERAN Rasta   7/30/2018 9:45 AM   Anticoagulation Therapy Visit    Description:  86 year old female   Provider:  DARA ANTI COAG   Department:  [facility]           INR as of 7/30/2018     Today's INR 2.7      Anticoagulation Summary as of 7/30/2018     INR goal 2.0-3.0   Today's INR 2.7   Full warfarin instructions 5 mg every day   Next INR check 8/27/2018    Indications   Paroxysmal atrial fibrillation (H) [I48.0]  Long-term (current) use of anticoagulants [Z79.01] [Z79.01]         Your next Anticoagulation Clinic appointment(s)     Aug 27, 2018 10:00 AM CDT   Anticoagulation Visit with DARA ANTI COAPARUL   [facility] ([facility]    [address] [address] [phone]              Contact Numbers     [facility]  Please call [phone] to cancel and/or reschedule your appointment   Please call [phone] with any problems or questions regarding your therapy.        July 2018 Details    Sun Mon Tue Wed Thu Fri Sat     1               2               3               4               5               6               7                 8               9               10               11               12               13               14                 15               16               17               18               19               20               21                 22               23               24               25               26               27               28                 29               30      5 mg   See details      31      5 mg              Date Details   07/30 This INR check               How to take your warfarin dose     To take:  5 mg Take 1 of the 5 mg tablets.           August 2018 Details    Sun Mon Tue Wed Thu Fri Sat        1      5 mg         2      5 mg         3      5 mg         4      5 mg           5      5 mg         6      5 mg         7      5 mg         8      5 mg         9      5 mg         10      5 mg         11       5 mg           12      5 mg         13      5 mg         14      5 mg         15      5 mg         16      5 mg         17      5 mg         18      5 mg           19      5 mg         20      5 mg         21      5 mg         22      5 mg         23      5 mg         24      5 mg         25      5 mg           26      5 mg         27            28               29               30               31                 Date Details   No additional details    Date of next INR:  8/27/2018         How to take your warfarin dose     To take:  5 mg Take 1 of the 5 mg tablets.

## 2018-08-24 DIAGNOSIS — F41.9 ANXIETY: ICD-10-CM

## 2018-08-27 RX ORDER — ESCITALOPRAM OXALATE 10 MG/1
TABLET ORAL
Qty: 135 TABLET | Refills: 0 | Status: SHIPPED | OUTPATIENT
Start: 2018-08-27 | End: 2019-02-11

## 2018-08-27 NOTE — TELEPHONE ENCOUNTER
SEGUNDO-7 SCORE 2/22/2016 1/25/2017   Total Score 4 3     PHQ-9 SCORE 1/25/2017 9/22/2017 3/13/2018   Total Score - - -   Total Score 2 2 5   Prescription approved per [facility] Refill Protocol.  Abigail Perez RN

## 2018-08-28 ENCOUNTER — ANTICOAGULATION THERAPY VISIT (OUTPATIENT)
Dept: NURSING | Facility: CLINIC | Age: 83
End: 2018-08-28
Payer: COMMERCIAL

## 2018-08-28 DIAGNOSIS — I48.0 PAROXYSMAL ATRIAL FIBRILLATION (H): ICD-10-CM

## 2018-08-28 DIAGNOSIS — Z79.01 LONG-TERM (CURRENT) USE OF ANTICOAGULANTS: ICD-10-CM

## 2018-08-28 LAB — INR POINT OF CARE: 2.8 (ref 0.86–1.14)

## 2018-08-28 PROCEDURE — 36416 COLLJ CAPILLARY BLOOD SPEC: CPT

## 2018-08-28 PROCEDURE — 85610 PROTHROMBIN TIME: CPT | Mod: QW

## 2018-08-28 PROCEDURE — 99207 ZZC NO CHARGE NURSE ONLY: CPT

## 2018-08-28 NOTE — PROGRESS NOTES
ANTICOAGULATION FOLLOW-UP CLINIC VISIT    Patient Name:  Shannan Magdaleno  Date:  8/28/2018  Contact Type:  Face to Face    SUBJECTIVE:     Patient Findings     Positives No Problem Findings    Comments Bleeding Signs/Symptoms: NO  Thromboembolic Signs/Symptoms: NO     Medication Changes:  NO  Dietary Changes:  NO  Bacterial/Viral Infection: NO     Missed Coumadin Doses: NO  Other Concerns:  NO             OBJECTIVE    INR Protime   Date Value Ref Range Status   08/28/2018 2.8 (A) 0.86 - 1.14 Final       ASSESSMENT / PLAN  INR assessment THER    Recheck INR In: 5 WEEKS    INR Location Clinic      Anticoagulation Summary as of 8/28/2018     INR goal 2.0-3.0   Today's INR 2.8   Warfarin maintenance plan 5 mg (5 mg x 1) every day   Full warfarin instructions 5 mg every day   Weekly warfarin total 35 mg   No change documented Moon Lobato RN   Plan last modified Moon Lobato RN (11/1/2017)   Next INR check 10/2/2018   Priority INR   Target end date Indefinite    Indications   Paroxysmal atrial fibrillation (H) [I48.0]  Long-term (current) use of anticoagulants [Z79.01] [Z79.01]         Anticoagulation Episode Summary     INR check location     Preferred lab     Send INR reminders to [facility]    Comments       Anticoagulation Care Providers     Provider Role Specialty Phone number    Adela Morgan MD [facility] Internal Medicine [phone]            See the Encounter Report to view Anticoagulation Flowsheet and Dosing Calendar (Go to Encounters tab in chart review, and find the Anticoagulation Therapy Visit)    Dosage adjustment made based on physician directed care plan.    Moon Lobato RN

## 2018-08-28 NOTE — MR AVS SNAPSHOT
Shannan TERAN Rasta   8/28/2018 8:30 AM   Anticoagulation Therapy Visit    Description:  86 year old female   Provider:  DARA ANTI COAG   Department:  [facility]           INR as of 8/28/2018     Today's INR 2.8      Anticoagulation Summary as of 8/28/2018     INR goal 2.0-3.0   Today's INR 2.8   Full warfarin instructions 5 mg every day   Next INR check 10/2/2018    Indications   Paroxysmal atrial fibrillation (H) [I48.0]  Long-term (current) use of anticoagulants [Z79.01] [Z79.01]         Your next Anticoagulation Clinic appointment(s)     Oct 02, 2018  9:00 AM CDT   Anticoagulation Visit with DARA ANTI COAPARUL   [facility] ([facility]    [address] [address] [phone]              Contact Numbers     [facility]  Please call [phone] to cancel and/or reschedule your appointment   Please call [phone] with any problems or questions regarding your therapy.        August 2018 Details    Sun Mon Tue Wed Thu Fri Sat        1               2               3               4                 5               6               7               8               9               10               11                 12               13               14               15               16               17               18                 19               20               21               22               23               24               25                 26               27               28      5 mg   See details      29      5 mg         30      5 mg         31      5 mg           Date Details   08/28 This INR check               How to take your warfarin dose     To take:  5 mg Take 1 of the 5 mg tablets.           September 2018 Details    Sun Mon Tue Wed u Fri Sat           1      5 mg           2      5 mg         3      5 mg         4      5 mg         5      5 mg         6      5 mg         7      5 mg         8      5 mg           9      5 mg         10      5  mg         11      5 mg         12      5 mg         13      5 mg         14      5 mg         15      5 mg           16      5 mg         17      5 mg         18      5 mg         19      5 mg         20      5 mg         21      5 mg         22      5 mg           23      5 mg         24      5 mg         25      5 mg         26      5 mg         27      5 mg         28      5 mg         29      5 mg           30      5 mg                Date Details   No additional details            How to take your warfarin dose     To take:  5 mg Take 1 of the 5 mg tablets.           October 2018 Details    Sun Mon Tue Wed Thu Fri Sat      1      5 mg         2            3               4               5               6                 7               8               9               10               11               12               13                 14               15               16               17               18               19               20                 21               22               23               24               25               26               27                 28               29               30               31                   Date Details   No additional details    Date of next INR:  10/2/2018         How to take your warfarin dose     To take:  5 mg Take 1 of the 5 mg tablets.

## 2018-09-12 DIAGNOSIS — I48.0 PAROXYSMAL ATRIAL FIBRILLATION (H): ICD-10-CM

## 2018-09-12 DIAGNOSIS — F41.9 ANXIETY: ICD-10-CM

## 2018-09-13 RX ORDER — METOPROLOL TARTRATE 25 MG/1
TABLET, FILM COATED ORAL
Qty: 270 TABLET | Refills: 1 | Status: SHIPPED | OUTPATIENT
Start: 2018-09-13 | End: 2019-03-06

## 2018-09-13 NOTE — TELEPHONE ENCOUNTER
[place] -Diazepam is a controlled substance. Please complete controlled substance workflow and dot phrases, then reroute to RN refill pool

## 2018-09-14 RX ORDER — DIAZEPAM 2 MG
TABLET ORAL
Qty: 60 TABLET | Refills: 0 | Status: SHIPPED | OUTPATIENT
Start: 2018-09-14 | End: 2019-01-03

## 2018-09-14 NOTE — TELEPHONE ENCOUNTER
[place] reviewed:  reviewed- no concerns    [place]  [email or web address]    Tania Hall RN

## 2018-09-29 DIAGNOSIS — E78.5 HYPERLIPIDEMIA LDL GOAL <70: ICD-10-CM

## 2018-10-01 NOTE — TELEPHONE ENCOUNTER
"Routing refill request to provider for review/approval because:  Labs not current:        Requested Prescriptions   Pending Prescriptions Disp Refills     simvastatin (ZOCOR) 40 MG tablet [Pharmacy Med Name: SIMVASTATIN 40 MG TABLET]  Last Written Prescription Date:  9/22/17  Last Fill Quantity: 90,  # refills: 3   Last office visit: 3/5/2018 with prescribing provider:    Future Office Visit:     90 tablet 3     Sig: TAKE 1 TABLET (40 MG) BY MOUTH AT BEDTIME    Statins Protocol Failed    10/1/2018  8:52 AM       Failed - LDL on file in past 12 months    Recent Labs   Lab Test  09/09/16   0931   LDL  53            Passed - No abnormal creatine kinase in past 12 months    Recent Labs   Lab Test  05/21/14   0859   CKT  60               Passed - Recent (12 mo) or future (30 days) visit within the authorizing provider's specialty    Patient had office visit in the last 12 months or has a visit in the next 30 days with authorizing provider or within the authorizing provider's specialty.  See \"Patient Info\" tab in inbasket, or \"Choose Columns\" in Meds & Orders section of the refill encounter.           Passed - Patient is age 18 or older       Passed - No active pregnancy on record       Passed - No positive pregnancy test in past 12 months        Moon Lobato RN - BC      "

## 2018-10-02 ENCOUNTER — ANTICOAGULATION THERAPY VISIT (OUTPATIENT)
Dept: NURSING | Facility: CLINIC | Age: 83
End: 2018-10-02
Payer: COMMERCIAL

## 2018-10-02 DIAGNOSIS — I48.0 PAROXYSMAL ATRIAL FIBRILLATION (H): ICD-10-CM

## 2018-10-02 LAB — INR POINT OF CARE: 2.7 (ref 0.86–1.14)

## 2018-10-02 PROCEDURE — 36416 COLLJ CAPILLARY BLOOD SPEC: CPT

## 2018-10-02 PROCEDURE — 85610 PROTHROMBIN TIME: CPT | Mod: QW

## 2018-10-02 PROCEDURE — 99207 ZZC NO CHARGE NURSE ONLY: CPT

## 2018-10-02 RX ORDER — SIMVASTATIN 40 MG
TABLET ORAL
Qty: 90 TABLET | Refills: 3 | Status: SHIPPED | OUTPATIENT
Start: 2018-10-02 | End: 2019-09-09

## 2018-10-02 NOTE — MR AVS SNAPSHOT
Shannan Colemankarl   10/2/2018 11:45 AM   Anticoagulation Therapy Visit    Description:  86 year old female   Provider:  DARA ANTI COAG   Department:  [facility]           INR as of 10/2/2018     Today's INR 2.7      Anticoagulation Summary as of 10/2/2018     INR goal 2.0-3.0   Today's INR 2.7   Full warfarin instructions 5 mg every day   Next INR check 11/13/2018    Indications   Paroxysmal atrial fibrillation (H) [I48.0]  Long-term (current) use of anticoagulants [Z79.01] [Z79.01]         Your next Anticoagulation Clinic appointment(s)     Nov 13, 2018 10:30 AM CST   Anticoagulation Visit with DARA ANTI COAG   [facility] ([facility]    [address] [address] [phone]              Contact Numbers     [facility]  Please call [phone] to cancel and/or reschedule your appointment   Please call [phone] with any problems or questions regarding your therapy.        October 2018 Details    Sun Mon Tue Wed Thu Fri Sat      1               2      5 mg   See details      3      5 mg         4      5 mg         5      5 mg         6      5 mg           7      5 mg         8      5 mg         9      5 mg         10      5 mg         11      5 mg         12      5 mg         13      5 mg           14      5 mg         15      5 mg         16      5 mg         17      5 mg         18      5 mg         19      5 mg         20      5 mg           21      5 mg         22      5 mg         23      5 mg         24      5 mg         25      5 mg         26      5 mg         27      5 mg           28      5 mg         29      5 mg         30      5 mg         31      5 mg             Date Details   10/02 This INR check               How to take your warfarin dose     To take:  5 mg Take 1 of the 5 mg tablets.           November 2018 Details    Sun Mon Tue Wed Thu Fri Sat         1      5 mg         2      5 mg         3      5 mg           4      5 mg         5      5  mg         6      5 mg         7      5 mg         8      5 mg         9      5 mg         10      5 mg           11      5 mg         12      5 mg         13            14               15               16               17                 18               19               20               21               22               23               24                 25               26               27               28               29               30                 Date Details   No additional details    Date of next INR:  11/13/2018         How to take your warfarin dose     To take:  5 mg Take 1 of the 5 mg tablets.

## 2018-10-02 NOTE — PROGRESS NOTES
ANTICOAGULATION FOLLOW-UP CLINIC VISIT    Patient Name:  Shannan Magdaleno  Date:  10/2/2018  Contact Type:  Face to Face    SUBJECTIVE:     Patient Findings     Positives No Problem Findings           OBJECTIVE    INR Protime   Date Value Ref Range Status   10/02/2018 2.7 (A) 0.86 - 1.14 Final       ASSESSMENT / PLAN  INR assessment THER    Recheck INR In: 6 WEEKS    INR Location Clinic      Anticoagulation Summary as of 10/2/2018     INR goal 2.0-3.0   Today's INR 2.7   Warfarin maintenance plan 5 mg (5 mg x 1) every day   Full warfarin instructions 5 mg every day   Weekly warfarin total 35 mg   No change documented Nataly Tolentino RN   Plan last modified Moon Lobato RN (11/1/2017)   Next INR check 11/13/2018   Priority INR   Target end date Indefinite    Indications   Paroxysmal atrial fibrillation (H) [I48.0]  Long-term (current) use of anticoagulants [Z79.01] [Z79.01]         Anticoagulation Episode Summary     INR check location     Preferred lab     Send INR reminders to [facility]    Comments       Anticoagulation Care Providers     Provider Role Specialty Phone number    Adela Morgan MD [facility] Internal Medicine [phone]            See the Encounter Report to view Anticoagulation Flowsheet and Dosing Calendar (Go to Encounters tab in chart review, and find the Anticoagulation Therapy Visit)    Dosage adjustment made based on physician directed care plan.    Nataly Tolentino RN

## 2018-10-07 DIAGNOSIS — I48.0 PAROXYSMAL ATRIAL FIBRILLATION (H): ICD-10-CM

## 2018-10-08 RX ORDER — WARFARIN SODIUM 5 MG/1
TABLET ORAL
Qty: 90 TABLET | Refills: 0 | Status: SHIPPED | OUTPATIENT
Start: 2018-10-08 | End: 2019-01-03

## 2018-10-29 ENCOUNTER — OFFICE VISIT (OUTPATIENT)
Dept: FAMILY MEDICINE | Facility: CLINIC | Age: 83
End: 2018-10-29
Payer: COMMERCIAL

## 2018-10-29 ENCOUNTER — TELEPHONE (OUTPATIENT)
Dept: FAMILY MEDICINE | Facility: CLINIC | Age: 83
End: 2018-10-29

## 2018-10-29 ENCOUNTER — RADIANT APPOINTMENT (OUTPATIENT)
Dept: GENERAL RADIOLOGY | Facility: CLINIC | Age: 83
End: 2018-10-29
Attending: INTERNAL MEDICINE
Payer: COMMERCIAL

## 2018-10-29 VITALS
WEIGHT: 147 LBS | DIASTOLIC BLOOD PRESSURE: 66 MMHG | SYSTOLIC BLOOD PRESSURE: 130 MMHG | HEART RATE: 82 BPM | OXYGEN SATURATION: 97 % | BODY MASS INDEX: 26.25 KG/M2 | TEMPERATURE: 98 F

## 2018-10-29 DIAGNOSIS — R05.9 COUGH: ICD-10-CM

## 2018-10-29 DIAGNOSIS — Z23 NEED FOR PROPHYLACTIC VACCINATION AND INOCULATION AGAINST INFLUENZA: ICD-10-CM

## 2018-10-29 DIAGNOSIS — R05.9 COUGH: Primary | ICD-10-CM

## 2018-10-29 PROCEDURE — 71046 X-RAY EXAM CHEST 2 VIEWS: CPT

## 2018-10-29 PROCEDURE — 99213 OFFICE O/P EST LOW 20 MIN: CPT | Performed by: INTERNAL MEDICINE

## 2018-10-29 ASSESSMENT — PATIENT HEALTH QUESTIONNAIRE - PHQ9: SUM OF ALL RESPONSES TO PHQ QUESTIONS 1-9: 4

## 2018-10-29 NOTE — PROGRESS NOTES
SUBJECTIVE:   Shannan Magdaleno is a 86 year old female who presents to clinic today for the following health issues:    Cough  Shannan reports a cough that has been present for a couple days now. She notes less coughing today but yesterday it was at its worst. She says that this coughing has caused her ribs to be sore especially on the left side. She originally had fever and chills but no longer. Denies sputum production, sore throat, postnasal dripping and shortness of breath.    Problem list and histories reviewed & adjusted, as indicated.  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)     Restless leg syndrome     Hyperlipidemia LDL goal <100     Paroxysmal atrial fibrillation (H)     ACP (advance care planning)     Tremor     CAD (coronary artery disease)     Retinal dot hemorrhage or microaneuysm, os     Osteopenia     Insomnia     OA (osteoarthritis) of knee     Retroperitoneal lymphadenopathy     Spinal stenosis of lumbar region without neurogenic claudication     Lymphoma, small lymphocytic (H)     Long-term (current) use of anticoagulants [Z79.01]     Posterior vitreous detachment, right     Glaucoma suspect, bilateral     Driving safety issue     Pneumonia of left lower lobe due to infectious organism (H)     Anemia, unspecified type     Combined forms of age-related cataract, mild-mod, both eyes     Past Surgical History:   Procedure Laterality Date     APPENDECTOMY       BREAST BIOPSY, RT/LT  1955    X 2 - benign     C ANESTH,REPAIR LO ABD HERNIA NOS  1995     CARDIAC CATHERIZATION  1/99 , 12/04    PTCA - Stent X 1     HC REMOVAL GALLBLADDER         Social History   Substance Use Topics     Smoking status: Never Smoker     Smokeless tobacco: Never Used     Alcohol use No     Family History   Problem Relation Age of Onset     Respiratory Mother      HEART DISEASE  Father      Arthritis Sister      Obesity Sister      HEART DISEASE Sister      Hypertension Sister      HEART DISEASE Son      Neurologic Disorder Son      migraines     Arthritis Sister      HEART DISEASE Daughter      HEART DISEASE Daughter      Macular Degeneration Daughter      Neurologic Disorder Daughter      migraines     Neurologic Disorder Daughter      migraines     Cancer Paternal Aunt      Macular Degeneration Daughter      Diabetes No family hx of      Cerebrovascular Disease No family hx of      Thyroid Disease No family hx of      Glaucoma No family hx of          BP Readings from Last 3 Encounters:   10/29/18 130/66   04/26/18 120/60   03/13/18 112/66    Wt Readings from Last 3 Encounters:   10/29/18 66.7 kg (147 lb)   04/26/18 66 kg (145 lb 6.4 oz)   03/13/18 66.5 kg (146 lb 9.6 oz)        Reviewed and updated as needed this visit by clinical staff       Reviewed and updated as needed this visit by Provider       ROS:  Constitutional, HEENT, cardiovascular, pulmonary, GI, , musculoskeletal, neuro, skin, endocrine and psych systems are negative, except as otherwise noted.    This document serves as a record of the services and decisions personally performed and made by Daquan Calhoun MD. It was created on his behalf by Joseph Del Toro, a trained medical scribe. The creation of this document is based on the provider's statements to the medical scribe.  Joseph Del Toro 3:52 PM October 29, 2018    OBJECTIVE:     /66  Pulse 82  Temp 98  F (36.7  C) (Oral)  Wt 66.7 kg (147 lb)  SpO2 97%  BMI 26.25 kg/m2  Body mass index is 26.25 kg/(m^2).  GENERAL: healthy, alert and no distress  EYES: Eyes grossly normal to inspection, PERRL and conjunctivae and sclerae normal  HENT: post oropharynx mild erythema without whitish patches   NECK: no adenopathy, no asymmetry, masses, or scars and thyroid normal to palpation  RESP: a few opening snaps/wheezes  SKIN: no suspicious lesions or rashes to visible skin    NEURO: Normal strength and tone, mentation intact and speech normal  PSYCH: mentation appears normal, affect normal/bright    Diagnostic Test Results:  No results found for this or any previous visit (from the past 24 hour(s)).    ASSESSMENT/PLAN:     (R05) Cough  (primary encounter diagnosis)  Comment: no evidence of pneumonia or bacterial infection in my opinion at this point. Too early. Assume a posture of observation , check chest xray, supportive measures reviewed   Plan: XR Chest 2 Views        Further follow up depending on how things go     (Z23) Need for prophylactic vaccination and inoculation against influenza  Comment:   Plan:     See Patient Instructions    The information in this document, created by the medical scribe for me, accurately reflects the services I personally performed and the decisions made by me. I have reviewed and approved this document for accuracy prior to leaving the patient care area.  October 29, 2018 3:52 PM    Daquan Calhoun MD  [facility]

## 2018-10-29 NOTE — TELEPHONE ENCOUNTER
Pt was having loose stools and is wondering if her taking over the counter fiber will help her with this or what else do you recommend.     Mily DENT CMA ([facility])

## 2018-10-29 NOTE — PATIENT INSTRUCTIONS
Supportive measures reviewed ,    Rest   Push fluids   Acetaminophen prn for fever and aches and pains  Guaifenesin [ mucinex ] can help for sinus congestion   Robitussin DM, 1-2 tsp q 4-6 hours can help with congestion / coughing   Sucrets or other throat lozenges can help for sore throat along with gargling with warm salt water     [facility]    If you have any questions regarding to your visit please contact your care team:     Team Pink:   Clinic Hours Telephone Number   Internal Medicine:  Dr. Adela Yin, NP 7am-7pm  Monday - Thursday   7am-5pm  Fridays  [phone]  (Appointment scheduling available 24/7)   Urgent Care - [place] - 11am-9pm Monday-Friday Saturday-Sunday- 9am-5pm   [place] - 5pm-9pm Monday-Friday Saturday-Sunday- 9am-5pm  [phone] - [place]  [phone] - [place]       What options do I have for a visit other than an office visit? We offer electronic visits (e-visits) and telephone visits, when medically appropriate.  Please check with your medical insurance to see if these types of visits are covered, as you will be responsible for any charges that are not paid by your insurance.      You can use [employer] (secure electronic communication) to access to your chart, send your primary care provider a message, or make an appointment. Ask a team member how to get started.     For a price quote for your services, please call our Consumer Price Line at [phone] or our Imaging Cost estimation line at [phone] (for imaging tests).  Mily DENT CMA ([facility])

## 2018-10-29 NOTE — LETTER
My Depression Action Plan  Name: Shannan Magdaleno   Date of Birth 12/2/1931  Date: 10/29/2018    My doctor: Adela Morgan   My clinic: [address]  [place] [address]  [phone]          GREEN    ZONE   Good Control    What it looks like:     Things are going generally well. You have normal up s and down s. You may even feel depressed from time to time, but bad moods usually last less than a day.   What you need to do:  1. Continue to care for yourself (see self care plan)  2. Check your depression survival kit and update it as needed  3. Follow your physician s recommendations including any medication.  4. Do not stop taking medication unless you consult with your physician first.           YELLOW         ZONE Getting Worse    What it looks like:     Depression is starting to interfere with your life.     It may be hard to get out of bed; you may be starting to isolate yourself from others.    Symptoms of depression are starting to last most all day and this has happened for several days.     You may have suicidal thoughts but they are not constant.   What you need to do:     1. Call your care team, your response to treatment will improve if you keep your care team informed of your progress. Yellow periods are signs an adjustment may need to be made.     2. Continue your self-care, even if you have to fake it!    3. Talk to someone in your support network    4. Open up your depression survival kit           RED    ZONE Medical Alert - Get Help    What it looks like:     Depression is seriously interfering with your life.     You may experience these or other symptoms: You can t get out of bed most days, can t work or engage in other necessary activities, you have trouble taking care of basic hygiene, or basic responsibilities, thoughts of suicide or death that will not go away, self-injurious behavior.     What you need to do:  1. Call your care team and  request a same-day appointment. If they are not available (weekends or after hours) call your local crisis line, emergency room or 911.            Depression Self Care Plan / Survival Kit    Self-Care for Depression  Here s the deal. Your body and mind are really not as separate as most people think.  What you do and think affects how you feel and how you feel influences what you do and think. This means if you do things that people who feel good do, it will help you feel better.  Sometimes this is all it takes.  There is also a place for medication and therapy depending on how severe your depression is, so be sure to consult with your medical provider and/ or Behavioral Health Consultant if your symptoms are worsening or not improving.     In order to better manage my stress, I will:    Exercise  Get some form of exercise, every day. This will help reduce pain and release endorphins, the  feel good  chemicals in your brain. This is almost as good as taking antidepressants!  This is not the same as joining a gym and then never going! (they count on that by the way ) It can be as simple as just going for a walk or doing some gardening, anything that will get you moving.      Hygiene   Maintain good hygiene (Get out of bed in the morning, Make your bed, Brush your teeth, Take a shower, and Get dressed like you were going to work, even if you are unemployed).  If your clothes don't fit try to get ones that do.    Diet  I will strive to eat foods that are good for me, drink plenty of water, and avoid excessive sugar, caffeine, alcohol, and other mood-altering substances.  Some foods that are helpful in depression are: complex carbohydrates, B vitamins, flaxseed, fish or fish oil, fresh fruits and vegetables.    Psychotherapy  I agree to participate in Individual Therapy (if recommended).    Medication  If prescribed medications, I agree to take them.  Missing doses can result in serious side effects.  I understand that  drinking alcohol, or other illicit drug use, may cause potential side effects.  I will not stop my medication abruptly without first discussing it with my provider.    Staying Connected With Others  I will stay in touch with my friends, family members, and my primary care provider/team.    Use your imagination  Be creative.  We all have a creative side; it doesn t matter if it s oil painting, sand castles, or mud pies! This will also kick up the endorphins.    Witness Beauty  (AKA stop and smell the roses) Take a look outside, even in mid-winter. Notice colors, textures. Watch the squirrels and birds.     Service to others  Be of service to others.  There is always someone else in need.  By helping others we can  get out of ourselves  and remember the really important things.  This also provides opportunities for practicing all the other parts of the program.    Humor  Laugh and be silly!  Adjust your TV habits for less news and crime-drama and more comedy.    Control your stress  Try breathing deep, massage therapy, biofeedback, and meditation. Find time to relax each day.     My support system    Clinic Contact:  Phone number:    Contact 1:  Phone number:    Contact 2:  Phone number:    [place]/:  Phone number:    Therapist:  Phone number:    Local crisis center:    Phone number:    Other community support:  Phone number:

## 2018-10-29 NOTE — MR AVS SNAPSHOT
After Visit Summary   10/29/2018    Shannan Magdaleno    MRN: [id number]           Patient Information     Date Of Birth          12/2/1931        Visit Information        Provider Department      10/29/2018 3:50 PM Daquan Calhoun MD [facility]        Today's Diagnoses     Cough    -  1    Need for prophylactic vaccination and inoculation against influenza          Care Instructions    Supportive measures reviewed ,    Rest   Push fluids   Acetaminophen prn for fever and aches and pains  Guaifenesin [ mucinex ] can help for sinus congestion   Robitussin DM, 1-2 tsp q 4-6 hours can help with congestion / coughing   Sucrets or other throat lozenges can help for sore throat along with gargling with warm salt water     [facility]    If you have any questions regarding to your visit please contact your care team:     Team Pink:   Clinic Hours Telephone Number   Internal Medicine:  Dr. Adela Yin, NP 7am-7pm  Monday - Thursday   7am-5pm  Fridays  [phone]  (Appointment scheduling available 24/7)   Urgent Care - [place] - 11am-9pm Monday-Friday Saturday-Sunday- 9am-5pm   [place] - 5pm-9pm Monday-Friday Saturday-Sunday- 9am-5pm  [phone] - [place]  [phone] - [place]       What options do I have for a visit other than an office visit? We offer electronic visits (e-visits) and telephone visits, when medically appropriate.  Please check with your medical insurance to see if these types of visits are covered, as you will be responsible for any charges that are not paid by your insurance.      You can use [employer] (secure electronic communication) to access to your chart, send your primary care provider a message, or make an appointment. Ask a team member how to get started.     For a price quote for your services, please call our Consumer Price Line at [phone] or our Imaging Cost estimation  line at [phone] (for imaging tests).  Mily DENT CMA ([facility])            Follow-ups after your visit        Your next 10 appointments already scheduled     Nov 13, 2018 10:30 AM CST   Anticoagulation Visit with FZ ANTI COAG   [facility] [place] ([facility])    [address] [facility]  [place] [address] [phone]              Future tests that were ordered for you today     Open Future Orders        Priority Expected Expires Ordered    XR Chest 2 Views Routine 10/29/2018 10/29/2019 10/29/2018            Who to contact     If you have questions or need follow up information about today's clinic visit or your schedule please contact [facility] directly at [phone].  Normal or non-critical lab and imaging results will be communicated to you by [employer], letter or phone within 4 business days after the clinic has received the results. If you do not hear from us within 7 days, please contact the clinic through [employer] or phone. If you have a critical or abnormal lab result, we will notify you by phone as soon as possible.  Submit refill requests through [facility] or call your pharmacy and they will forward the refill request to us. Please allow 3 business days for your refill to be completed.          Additional Information About Your Visit        [employer] Information     [facility] gives you secure access to your electronic health record. If you see a primary care provider, you can also send messages to your care team and make appointments. If you have questions, please call your primary care clinic.  If you do not have a primary care provider, please call [phone] and they will assist you.        Care EveryWhere ID     This is your Care EveryWhere ID. This could be used by other organizations to access your [place] medical records  [id number]        Your Vitals Were     Pulse Temperature Pulse Oximetry BMI (Body Mass Index)          82 98  F (36.7  C) (Oral) 97% 26.25 kg/m2          Blood Pressure from Last 3 Encounters:   10/29/18 130/66   04/26/18 120/60   03/13/18 112/66    Weight from Last 3 Encounters:   10/29/18 147 lb (66.7 kg)   04/26/18 145 lb 6.4 oz (66 kg)   03/13/18 146 lb 9.6 oz (66.5 kg)               Primary Care Provider Office Phone # Fax #    Adela Breanne [place] [phone]       [address] [address]        Equal Access to Services     [facility]: Hadii aad ku hadasho Soomaali, waaxda luqadaha, qaybta kaalmada adeegyada, claudia valle . So [facility] [phone].    ATENCIÓN: Si habla español, tiene a linn disposición servicios gratuitos de asistencia lingüística. [facility] [phone].    We comply with applicable federal civil rights laws and [place] laws. We do not discriminate on the basis of race, color, national origin, age, disability, sex, sexual orientation, or gender identity.            Thank you!     Thank you for choosing [facility]  for your care. Our goal is always to provide you with excellent care. Hearing back from our patients is one way we can continue to improve our services. Please take a few minutes to complete the written survey that you may receive in the mail after your visit with us. Thank you!             Your Updated Medication List - Protect others around you: Learn how to safely use, store and throw away your medicines at www.disposemymeds.org.          This list is accurate as of 10/29/18  4:00 PM.  Always use your most recent med list.                   Brand Name Dispense Instructions for use Diagnosis    ASPIRIN NOT PRESCRIBED    INTENTIONAL    0 each    Please choose reason not prescribed, below    Paroxysmal atrial fibrillation (H)       calcium carbonate 600 mg-vitamin D 400 units 600-400 MG-UNIT per tablet    CALTRATE     Take 1 tablet by mouth daily        CENTRUM SILVER per tablet     30 tablet    Take 1 tablet by mouth daily Has 2000 IU of Vitamin D in it.     Osteopenia       diazepam 2 MG tablet    VALIUM    60 tablet    TAKE 1/2 TO 1 TABLET BY MOUTH BEDTIME AS NEEDED FOR ANXIETY    Anxiety       * escitalopram 10 MG tablet    LEXAPRO    135 tablet    TAKE 1&1/2 TABLETS (15 MG) BY MOUTH DAILY    Anxiety       * escitalopram 10 MG tablet    LEXAPRO    135 tablet    TAKE 1&1/2 TABLETS (15 MG) BY MOUTH DAILY    Anxiety       fluticasone 50 MCG/ACT spray    FLONASE    16 g    Spray 2 sprays into both nostrils daily    Viral URI       metoprolol tartrate 25 MG tablet    LOPRESSOR    270 tablet    TAKE 1.5 TABLETS (37.5 MG) BY MOUTH 2 TIMES DAILY    Paroxysmal atrial fibrillation (H)       nitroGLYcerin 0.4 MG sublingual tablet    NITROSTAT    25 tablet    Place 1 tablet (0.4 mg) under the tongue every 5 minutes as needed for chest pain    Coronary artery disease involving native coronary artery of native heart without angina pectoris       simvastatin 40 MG tablet    ZOCOR    90 tablet    TAKE 1 TABLET (40 MG) BY MOUTH AT BEDTIME    Hyperlipidemia LDL goal <70       TYLENOL 500 MG tablet   Generic drug:  acetaminophen      Take 1-2 tablets by mouth every 6 hours as needed.        warfarin 5 MG tablet    COUMADIN    90 tablet    TAKE 1 & 1/2 TABLETS ON WED, AND 1 TAB REST OF WEEK    Paroxysmal atrial fibrillation (H)       * Notice:  This list has 2 medication(s) that are the same as other medications prescribed for you. Read the directions carefully, and ask your doctor or other care provider to review them with you.

## 2018-10-29 NOTE — TELEPHONE ENCOUNTER
Patient notified of Provider's message as written.  Patient verbalized understanding.    Tania Hall RN

## 2018-10-29 NOTE — TELEPHONE ENCOUNTER
Yes. Taking fiber often does help    1-2 tablespoons with water 2 times a day     If the diarrhea is persistent past 1 week we should consider getting stool studies to include ova and parasites, cdiff etc.     Daquan Calhoun MD

## 2018-11-01 ENCOUNTER — TELEPHONE (OUTPATIENT)
Dept: INTERNAL MEDICINE | Facility: CLINIC | Age: 83
End: 2018-11-01

## 2018-11-01 NOTE — TELEPHONE ENCOUNTER
Dr Adela Morgan is currently out of the office. I am answering because I did see her for the diarrhea and will share an opinion.    Depending on how things go she is certainly recommended to have further follow up / further discussion with primary care physician     1. When a patient has complaints of a chronic diagnosis such as diarrhea, it's good if we can answer questions specifically as to what's causing this ?  2. If in point of fact this is actually a chronic problem, then the most common cause in an elderly woman is lactose intolerance . A trial of complete withdrawal of dairy products should be undertaken [ give her a lactose intolerance diet sheet ]  3. If we try that without success there's outside chance she has Claustridum difficil colitis, and it is to exclude this diagnosis that we recommended stool studies to include ova and parasites, cdiff etc.   4. If we want to, we can simply try imodium otc 1-2 tabs qid prn or prescription diphenoxylate-atropine (LOMOTIL) which helps with most forms of diarrhea , these antidiarrheal medications can be used on an as needed basis.    If patient wants prescription , mis Calhoun MD

## 2018-11-01 NOTE — TELEPHONE ENCOUNTER
Reason for call:  Patient reporting a symptom    Symptom or request: Diarrhea    Duration (how long have symptoms been present): a couple of weeks    Have you been treated for this before? Yes    Additional comments: It's not improving    Phone Number patient can be reached at:  Other phone number:  [phone] Minerva*    Best Time:  ASAP    Can we leave a detailed message on this number:  YES    Call taken on 11/1/2018 at 9:13 AM by Freida Dickens

## 2018-11-01 NOTE — TELEPHONE ENCOUNTER
"Patient was seen by Dr. Calhoun on 10/29/18 for a cough  Per daughter, Minerva, diarrhea was also reported at the visit.  Patient called on 10/29/18 after the OV and was advised that she can try an OTC fiber supplement, and if diarrhea persist, we would consider stool testing  She has been taking this without any improvement in her diarrhea.   Patient continues to feel weak.  No new symptoms   Daughter is not sure how long the diarrhea has been going on or how often patient has it, \"she is kind of mixed up on her times\"  Per Minerva, \"for some reason, every time she gest sick, she gets diarrhea too\"  Minerva is not sure patient would be able to collect a stool sample for testing if needed, \"last time we had to do this she said it's too complicated for her and I'm not too fond of going over there to help her with it and my dad isn't going to help her so I don't know if we would be able to get a sample from her\"    Please advise    Tania Hall RN    "

## 2018-11-01 NOTE — TELEPHONE ENCOUNTER
Daughter, Minerva, updated with Dr. Calhoun's recommendations.  She will have patient try withdrawing lactose products completely out of her diet.  If no improvement with the diet change, they will try the OTC imodium    If patient continues, to have diarrhea, or if at any point they want to attempt to collect a stool sample, they are to call the clinic back  Minerva also mentioned that her dad (patient's spouse) noticed patient was possibly hallucinating yesterday?  Patient thought she saw a van outside which patient's spouse did not see himself  Patient also thought she heard her spouse talking with her son in the kitchen which her spouse denied.  Per Minerva, her dad may also be a bit overwhelmed lately due to children not being able to come over the help more often.  He has been the sole/main caregiver for patient.  Patient is better today and Minerva was able to visit and help out    They will continue to monitor for any confusion/hallucinations    Tania Hall RN

## 2018-11-04 ENCOUNTER — MYC MEDICAL ADVICE (OUTPATIENT)
Dept: INTERNAL MEDICINE | Facility: CLINIC | Age: 83
End: 2018-11-04

## 2018-11-05 NOTE — PROGRESS NOTES
SUBJECTIVE:   Shannan Magdaleno is a 86 year old female who presents to clinic today for the following health issues:      Rash  Onset: last week    Description:   Location: right side chest arm and back (rt side)  Character: round, raised, red and pain  Itching (Pruritis): YES    Progression of Symptoms:  worsening    Accompanying Signs & Symptoms:  Fever: no   Body aches or joint pain: YES  Sore throat symptoms: no   Recent cold symptoms: no     History:   Previous similar rash: no     Precipitating factors:   Exposure to similar rash: no   New exposures: None   Recent travel: no     Alleviating factors:  nothing    Therapies Tried and outcome: Cortizone not sure it helped    Patient notes some pain and pruritis to rash.  She denies fever, drainage.  She notes new lesions to her right upper arm and has noted some occasional pain to her right jaw.    Patient also has had diarrhea for several weeks.  She notes having 1-2 episodes of explosive diarrhea daily.  She had increased fiber and also eliminated dairy without improvement.  She denies weight loss, hematochezia, melena.  She denies abdominal pain.  Patient wonders if she can start immodium.      Problem list and histories reviewed & adjusted, as indicated.  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)     Restless leg syndrome     Hyperlipidemia LDL goal <100     Paroxysmal atrial fibrillation (H)     ACP (advance care planning)     Tremor     CAD (coronary artery disease)     Retinal dot hemorrhage or microaneuysm, os     Osteopenia     Insomnia     OA (osteoarthritis) of knee     Retroperitoneal lymphadenopathy     Spinal stenosis of lumbar region without neurogenic claudication     Lymphoma, small lymphocytic (H)     Long-term (current) use of anticoagulants [Z79.01]     Posterior vitreous detachment, right      Glaucoma suspect, bilateral     Driving safety issue     Pneumonia of left lower lobe due to infectious organism (H)     Anemia, unspecified type     Combined forms of age-related cataract, mild-mod, both eyes     Past Surgical History:   Procedure Laterality Date     APPENDECTOMY       BREAST BIOPSY, RT/LT  1955    X 2 - benign     C ANESTH,REPAIR LO ABD HERNIA NOS  1995     CARDIAC CATHERIZATION  1/99 , 12/04    PTCA - Stent X 1     HC REMOVAL GALLBLADDER         Social History   Substance Use Topics     Smoking status: Never Smoker     Smokeless tobacco: Never Used     Alcohol use No     Family History   Problem Relation Age of Onset     Respiratory Mother      HEART DISEASE Father      Arthritis Sister      Obesity Sister      HEART DISEASE Sister      Hypertension Sister      HEART DISEASE Son      Neurologic Disorder Son      migraines     Arthritis Sister      HEART DISEASE Daughter      HEART DISEASE Daughter      Macular Degeneration Daughter      Neurologic Disorder Daughter      migraines     Neurologic Disorder Daughter      migraines     Cancer Paternal Aunt      Macular Degeneration Daughter      Diabetes No family hx of      Cerebrovascular Disease No family hx of      Thyroid Disease No family hx of      Glaucoma No family hx of          Current Outpatient Prescriptions   Medication Sig Dispense Refill     acetaminophen (TYLENOL) 500 MG tablet Take 1-2 tablets by mouth every 6 hours as needed.       ASPIRIN NOT PRESCRIBED (INTENTIONAL) Please choose reason not prescribed, below 0 each 0     calcium-vitamin D (CALTRATE) 600-400 MG-UNIT per tablet Take 1 tablet by mouth daily       diazepam (VALIUM) 2 MG tablet TAKE 1/2 TO 1 TABLET BY MOUTH BEDTIME AS NEEDED FOR ANXIETY 60 tablet 0     escitalopram (LEXAPRO) 10 MG tablet TAKE 1&1/2 TABLETS (15 MG) BY MOUTH DAILY 135 tablet 0     fluticasone (FLONASE) 50 MCG/ACT spray Spray 2 sprays into both nostrils daily 16 g 1     metoprolol tartrate (LOPRESSOR) 25  "MG tablet TAKE 1.5 TABLETS (37.5 MG) BY MOUTH 2 TIMES DAILY 270 tablet 1     Multiple Vitamins-Minerals (CENTRUM SILVER) per tablet Take 1 tablet by mouth daily Has 2000 IU of Vitamin D in it. 30 tablet      nitroGLYcerin (NITROSTAT) 0.4 MG sublingual tablet Place 1 tablet (0.4 mg) under the tongue every 5 minutes as needed for chest pain 25 tablet 3     simvastatin (ZOCOR) 40 MG tablet TAKE 1 TABLET (40 MG) BY MOUTH AT BEDTIME 90 tablet 3     valACYclovir (VALTREX) 1000 mg tablet Take 1 tablet (1,000 mg) by mouth 3 times daily 21 tablet 0     warfarin (COUMADIN) 5 MG tablet TAKE 1 & 1/2 TABLETS ON WED, AND 1 TAB REST OF WEEK 90 tablet 0     escitalopram (LEXAPRO) 10 MG tablet TAKE 1&1/2 TABLETS (15 MG) BY MOUTH DAILY (Patient not taking: Reported on 11/6/2018) 135 tablet 1     Allergies   Allergen Reactions     Codeine Nausea and Vomiting     Latex Itching     Lisinopril      cough     Tamiflu [Oseltamivir] GI Disturbance     Diarrhea and vomiting (would try Relenza)     Atorvastatin Calcium      Myalgias       Sulfa Drugs Nausea and Vomiting     BP Readings from Last 3 Encounters:   11/06/18 132/56   10/29/18 130/66   04/26/18 120/60    Wt Readings from Last 3 Encounters:   11/06/18 143 lb 12.8 oz (65.2 kg)   10/29/18 147 lb (66.7 kg)   04/26/18 145 lb 6.4 oz (66 kg)                  Labs reviewed in EPIC    Reviewed and updated as needed this visit by clinical staff       Reviewed and updated as needed this visit by Provider         ROS:  Constitutional, HEENT, cardiovascular, pulmonary, gi and gu systems are negative, except as otherwise noted.    OBJECTIVE:     /56  Pulse 65  Temp 96.7  F (35.9  C) (Oral)  Resp 20  Ht 5' 2\" (1.575 m)  Wt 143 lb 12.8 oz (65.2 kg)  SpO2 98%  BMI 26.3 kg/m2  Body mass index is 26.3 kg/(m^2).  GENERAL: healthy, alert and no distress  RESP: lungs clear to auscultation - no rales, rhonchi or wheezes  CV: regular rate and rhythm, normal S1 S2, no S3 or S4, no murmur, " click or rub, no peripheral edema and peripheral pulses strong  ABDOMEN: soft, nontender, no hepatosplenomegaly, no masses and bowel sounds normal  MS: no gross musculoskeletal defects noted, no edema  SKIN: zosteriform eruption - right upper back, upper chest, right outer arm    Diagnostic Test Results:  pending    ASSESSMENT/PLAN:     1. Functional diarrhea  I suspect patient's diarrhea is function in nature.  She will attempt to leave stool studies today while in clinic, but feels it will be too difficult to do at home.  She may try over the counter immodium to help symptoms.  FODMAP diet given to review.  - Clostridium difficile Toxin B PCR; Future  - Enteric Bacteria and Virus Panel by ESPERANZA Stool; Future  - Ova and Parasite Exam Routine; Future    2. Herpes zoster without complication  Patient advised to monitor for s/s of infection.  Patient to use tylenol for pain.  - valACYclovir (VALTREX) 1000 mg tablet; Take 1 tablet (1,000 mg) by mouth 3 times daily  Dispense: 21 tablet; Refill: 0    FUTURE APPOINTMENTS:       - Follow-up for annual visit or as needed    ANDREW Kaplan CNP  [facility]

## 2018-11-06 ENCOUNTER — OFFICE VISIT (OUTPATIENT)
Dept: FAMILY MEDICINE | Facility: CLINIC | Age: 83
End: 2018-11-06
Payer: COMMERCIAL

## 2018-11-06 VITALS
HEIGHT: 62 IN | SYSTOLIC BLOOD PRESSURE: 132 MMHG | OXYGEN SATURATION: 98 % | TEMPERATURE: 96.7 F | RESPIRATION RATE: 20 BRPM | WEIGHT: 143.8 LBS | BODY MASS INDEX: 26.46 KG/M2 | DIASTOLIC BLOOD PRESSURE: 56 MMHG | HEART RATE: 65 BPM

## 2018-11-06 DIAGNOSIS — B02.9 HERPES ZOSTER WITHOUT COMPLICATION: ICD-10-CM

## 2018-11-06 DIAGNOSIS — K59.1 FUNCTIONAL DIARRHEA: Primary | ICD-10-CM

## 2018-11-06 PROCEDURE — 99214 OFFICE O/P EST MOD 30 MIN: CPT | Performed by: NURSE PRACTITIONER

## 2018-11-06 RX ORDER — VALACYCLOVIR HYDROCHLORIDE 1 G/1
1000 TABLET, FILM COATED ORAL 3 TIMES DAILY
Qty: 21 TABLET | Refills: 0 | Status: SHIPPED | OUTPATIENT
Start: 2018-11-06 | End: 2019-06-03

## 2018-11-06 ASSESSMENT — PAIN SCALES - GENERAL: PAINLEVEL: NO PAIN (0)

## 2018-11-06 NOTE — PATIENT INSTRUCTIONS
[facility]    If you have any questions regarding to your visit please contact your care team:     Team Pink:   Clinic Hours Telephone Number   Internal Medicine:  Dr. Adela Yin NP 7am-7pm  Monday - Thursday   7am-5pm  Fridays  [phone]  (Appointment scheduling available 24/7)   Urgent Care - [place] and [facility] - 11am-9pm Monday-Friday Saturday-Sunday- 9am-5pm   [place] - 5pm-9pm Monday-Friday Saturday-Sunday- 9am-5pm  [phone] - [place]  [phone] - [place]       What options do I have for a visit other than an office visit? We offer electronic visits (e-visits) and telephone visits, when medically appropriate.  Please check with your medical insurance to see if these types of visits are covered, as you will be responsible for any charges that are not paid by your insurance.      You can use [employer] (secure electronic communication) to access to your chart, send your primary care provider a message, or make an appointment. Ask a team member how to get started.     For a price quote for your services, please call our Consumer Price Line at [phone] or our Imaging Cost estimation line at [phone] (for imaging tests).  Kathryn BENAVIDEZ CMA

## 2018-11-06 NOTE — MR AVS SNAPSHOT
After Visit Summary   11/6/2018    Shannan Magdaleno    MRN: [id number]           Patient Information     Date Of Birth          12/2/1931        Visit Information        Provider Department      11/6/2018 11:40 AM Dorothy Yin APRN CNP [facility]        Today's Diagnoses     Functional diarrhea    -  1    Herpes zoster without complication          Care Instructions    [facility]    If you have any questions regarding to your visit please contact your care team:     Team Pink:   Clinic Hours Telephone Number   Internal Medicine:  Dr. Adela Yin, NP 7am-7pm  Monday - Thursday   7am-5pm  Fridays  [phone]  (Appointment scheduling available 24/7)   Urgent Care - [place] [facility] - 11am-9pm Monday-Friday Saturday-Sunday- 9am-5pm   [place] - 5pm-9pm Monday-Friday Saturday-Sunday- 9am-5pm  [phone] - [place]  [phone] - [place]       What options do I have for a visit other than an office visit? We offer electronic visits (e-visits) and telephone visits, when medically appropriate.  Please check with your medical insurance to see if these types of visits are covered, as you will be responsible for any charges that are not paid by your insurance.      You can use [employer] (secure electronic communication) to access to your chart, send your primary care provider a message, or make an appointment. Ask a team member how to get started.     For a price quote for your services, please call our Consumer Price Line at [phone] or our Imaging Cost estimation line at [phone] (for imaging tests).  Kathryn BENAVIDEZ CMA            Follow-ups after your visit        Your next 10 appointments already scheduled     Nov 13, 2018 10:30 AM CST   Anticoagulation Visit with ABIGAIL ANTI COAG   [facility]    [address]  [place] [address] [phone]         "      Future tests that were ordered for you today     Open Future Orders        Priority Expected Expires Ordered    Clostridium difficile Toxin B PCR Routine  12/6/2018 11/6/2018    Enteric Bacteria and Virus Panel by ESPERANZA Stool Routine  11/6/2019 11/6/2018    Ova and Parasite Exam Routine Routine  11/6/2019 11/6/2018            Who to contact     If you have questions or need follow up information about today's clinic visit or your schedule please contact [facility] directly at [phone].  Normal or non-critical lab and imaging results will be communicated to you by [employer], letter or phone within 4 business days after the clinic has received the results. If you do not hear from us within 7 days, please contact the clinic through Storyb[employer] or phone. If you have a critical or abnormal lab result, we will notify you by phone as soon as possible.  Submit refill requests through V[employer] or call your pharmacy and they will forward the refill request to us. Please allow 3 business days for your refill to be completed.          Additional Information About Your Visit        V[employer] Information     [employer] gives you secure access to your electronic health record. If you see a primary care provider, you can also send messages to your care team and make appointments. If you have questions, please call your primary care clinic.  If you do not have a primary care provider, please call [phone] and they will assist you.        Care EveryWhere ID     This is your Care EveryWhere ID. This could be used by other organizations to access your [place] medical records  [id number]        Your Vitals Were     Pulse Temperature Respirations Height Pulse Oximetry BMI (Body Mass Index)    65 96.7  F (35.9  C) (Oral) 20 5' 2\" (1.575 m) 98% 26.3 kg/m2       Blood Pressure from Last 3 Encounters:   11/06/18 132/56   10/29/18 130/66   04/26/18 120/60    Weight from Last 3 Encounters:   11/06/18 143 lb 12.8 oz (65.2 kg) "   10/29/18 147 lb (66.7 kg)   04/26/18 145 lb 6.4 oz (66 kg)              We Performed the Following     PAF COMPLETED          Today's Medication Changes          These changes are accurate as of 11/6/18 12:33 PM.  If you have any questions, ask your nurse or doctor.               Start taking these medicines.        Dose/Directions    valACYclovir 1000 mg tablet   Commonly known as:  VALTREX   Used for:  Herpes zoster without complication   Started by:  Dorothy Yin APRN CNP        Dose:  1000 mg   Take 1 tablet (1,000 mg) by mouth 3 times daily   Quantity:  21 tablet   Refills:  0            Where to get your medicines      These medications were sent to [facility]/pharmacy #[id number] - [place], [place] - [address] [place] [id number], [place] [address]     Phone:  [phone]     valACYclovir 1000 mg tablet                Primary Care Provider Office Phone # Fax #    Adela Morgan -[phone]       [address] [address]        Equal Access to Services     [facility]: Hadii taz ku hadasho Soomaali, waaxda luqadaha, qaybta kaalmada adeegyaderrick, claudia valle . So [facility] [phone].    ATENCIÓN: Si habla español, tiene a linn disposición servicios gratuitos de asistencia lingüística. Llame al [phone].    We comply with applicable federal civil rights laws and [place] laws. We do not discriminate on the basis of race, color, national origin, age, disability, sex, sexual orientation, or gender identity.            Thank you!     Thank you for choosing [facility]  for your care. Our goal is always to provide you with excellent care. Hearing back from our patients is one way we can continue to improve our services. Please take a few minutes to complete the written survey that you may receive in the mail after your visit with us. Thank you!             Your Updated Medication List - Protect  others around you: Learn how to safely use, store and throw away your medicines at www.disposemymeds.org.          This list is accurate as of 11/6/18 12:33 PM.  Always use your most recent med list.                   Brand Name Dispense Instructions for use Diagnosis    ASPIRIN NOT PRESCRIBED    INTENTIONAL    0 each    Please choose reason not prescribed, below    Paroxysmal atrial fibrillation (H)       calcium carbonate 600 mg-vitamin D 400 units 600-400 MG-UNIT per tablet    CALTRATE     Take 1 tablet by mouth daily        CENTRUM SILVER per tablet     30 tablet    Take 1 tablet by mouth daily Has 2000 IU of Vitamin D in it.    Osteopenia       diazepam 2 MG tablet    VALIUM    60 tablet    TAKE 1/2 TO 1 TABLET BY MOUTH BEDTIME AS NEEDED FOR ANXIETY    Anxiety       * escitalopram 10 MG tablet    LEXAPRO    135 tablet    TAKE 1&1/2 TABLETS (15 MG) BY MOUTH DAILY    Anxiety       * escitalopram 10 MG tablet    LEXAPRO    135 tablet    TAKE 1&1/2 TABLETS (15 MG) BY MOUTH DAILY    Anxiety       fluticasone 50 MCG/ACT spray    FLONASE    16 g    Spray 2 sprays into both nostrils daily    Viral URI       metoprolol tartrate 25 MG tablet    LOPRESSOR    270 tablet    TAKE 1.5 TABLETS (37.5 MG) BY MOUTH 2 TIMES DAILY    Paroxysmal atrial fibrillation (H)       nitroGLYcerin 0.4 MG sublingual tablet    NITROSTAT    25 tablet    Place 1 tablet (0.4 mg) under the tongue every 5 minutes as needed for chest pain    Coronary artery disease involving native coronary artery of native heart without angina pectoris       simvastatin 40 MG tablet    ZOCOR    90 tablet    TAKE 1 TABLET (40 MG) BY MOUTH AT BEDTIME    Hyperlipidemia LDL goal <70       TYLENOL 500 MG tablet   Generic drug:  acetaminophen      Take 1-2 tablets by mouth every 6 hours as needed.        valACYclovir 1000 mg tablet    VALTREX    21 tablet    Take 1 tablet (1,000 mg) by mouth 3 times daily    Herpes zoster without complication       warfarin 5 MG tablet     COUMADIN    90 tablet    TAKE 1 & 1/2 TABLETS ON WED, AND 1 TAB REST OF WEEK    Paroxysmal atrial fibrillation (H)       * Notice:  This list has 2 medication(s) that are the same as other medications prescribed for you. Read the directions carefully, and ask your doctor or other care provider to review them with you.

## 2018-11-07 DIAGNOSIS — K59.1 FUNCTIONAL DIARRHEA: ICD-10-CM

## 2018-11-07 LAB
C DIFF TOX B STL QL: NEGATIVE
SPECIMEN SOURCE: NORMAL

## 2018-11-07 PROCEDURE — 87493 C DIFF AMPLIFIED PROBE: CPT | Mod: 59 | Performed by: NURSE PRACTITIONER

## 2018-11-07 PROCEDURE — 87506 IADNA-DNA/RNA PROBE TQ 6-11: CPT | Performed by: NURSE PRACTITIONER

## 2018-11-07 PROCEDURE — 87209 SMEAR COMPLEX STAIN: CPT | Performed by: NURSE PRACTITIONER

## 2018-11-07 PROCEDURE — 87177 OVA AND PARASITES SMEARS: CPT | Performed by: NURSE PRACTITIONER

## 2018-11-08 LAB
C COLI+JEJUNI+LARI FUSA STL QL NAA+PROBE: NOT DETECTED
EC STX1 GENE STL QL NAA+PROBE: NOT DETECTED
EC STX2 GENE STL QL NAA+PROBE: NOT DETECTED
ENTERIC PATHOGEN COMMENT: NORMAL
NOROV GI+II ORF1-ORF2 JNC STL QL NAA+PR: NOT DETECTED
O+P STL MICRO: NORMAL
O+P STL MICRO: NORMAL
RVA NSP5 STL QL NAA+PROBE: NOT DETECTED
SALMONELLA SP RPOD STL QL NAA+PROBE: NOT DETECTED
SHIGELLA SP+EIEC IPAH STL QL NAA+PROBE: NOT DETECTED
SPECIMEN SOURCE: NORMAL
V CHOL+PARA RFBL+TRKH+TNAA STL QL NAA+PR: NOT DETECTED
Y ENTERO RECN STL QL NAA+PROBE: NOT DETECTED

## 2018-11-08 NOTE — PROGRESS NOTES
Dear Shannan,    Your recent test results are attached.      No C. Diff.  No viral or bacterial infection.    If you have any questions please feel free to contact [phone] or myself via [employer] MROhart.    Sincerely,  Dorothy Yin, CNP

## 2018-11-09 NOTE — PROGRESS NOTES
Dear Shannan,    Your recent test results are attached.      No OVA and parasites noted.  Please let me know if diarrhea is not improving with immodium.    If you have any questions please feel free to contact [phone] or myself via [email or web address].    Sincerely,  Dorothy Yin, CNP

## 2018-11-13 ENCOUNTER — ANTICOAGULATION THERAPY VISIT (OUTPATIENT)
Dept: NURSING | Facility: CLINIC | Age: 83
End: 2018-11-13
Payer: COMMERCIAL

## 2018-11-13 DIAGNOSIS — I48.0 PAROXYSMAL ATRIAL FIBRILLATION (H): ICD-10-CM

## 2018-11-13 LAB — INR POINT OF CARE: 4.1 (ref 0.86–1.14)

## 2018-11-13 PROCEDURE — 36416 COLLJ CAPILLARY BLOOD SPEC: CPT

## 2018-11-13 PROCEDURE — 85610 PROTHROMBIN TIME: CPT | Mod: QW

## 2018-11-13 PROCEDURE — 99207 ZZC NO CHARGE NURSE ONLY: CPT

## 2018-11-13 NOTE — MR AVS SNAPSHOT
Shannan Colemankarl   11/13/2018 10:30 AM   Anticoagulation Therapy Visit    Description:  86 year old female   Provider:  DARA ANTI COAG   Department:  [facility]           INR as of 11/13/2018     Today's INR 4.1!      Anticoagulation Summary as of 11/13/2018     INR goal 2.0-3.0   Today's INR 4.1!   Full warfarin instructions 11/14: Hold; 11/15: 2.5 mg; Otherwise 5 mg every day   Next INR check 11/23/2018    Indications   Paroxysmal atrial fibrillation (H) [I48.0]  Long-term (current) use of anticoagulants [Z79.01] [Z79.01]         Instructions    Some signs and symptoms of bleeding include: Nose bleed or cut that does not stop bleeding in 10 minutes, bleeding of the gums, vomiting (will look like coffee grounds) or coughing up blood, unusual, easy or large areas of bruising, increased or unexpected vaginal bleeding or increased menstrual flow, red or black stools, red or orange urine, prolonged or severe headache, pale skin, unusual or constant tiredness.  If you have these please call 911 or seek medical care immediately.            Your next Anticoagulation Clinic appointment(s)     Nov 23, 2018 10:45 AM CST   Anticoagulation Visit with DARA ANTI DEBRA   [facility] ([facility]    [address] [address] [phone]              Contact Numbers     [facility]  Please call [phone] to cancel and/or reschedule your appointment   Please call [phone] with any problems or questions regarding your therapy.        November 2018 Details    Sun Mon Tue Wed Thu Fri Sat         1               2               3                 4               5               6               7               8               9               10                 11               12               13      5 mg   See details      14      Hold         15      2.5 mg         16      5 mg         17      5 mg           18      5 mg         19      5 mg         20      5 mg         21      5  mg         22      5 mg         23            24                 25               26               27               28               29               30                 Date Details   11/13 This INR check       Date of next INR:  11/23/2018         How to take your warfarin dose     To take:  2.5 mg Take 0.5 of a 5 mg tablet.    To take:  5 mg Take 1 of the 5 mg tablets.    Hold Do not take your warfarin dose. See the Details table to the right for additional instructions.

## 2018-11-13 NOTE — PROGRESS NOTES
ANTICOAGULATION FOLLOW-UP CLINIC VISIT    Patient Name:  Shannan Magdaleno  Date:  11/13/2018  Contact Type:  Face to Face    SUBJECTIVE:     Patient Findings     Positives Antibiotic use or infection, No Problem Findings    Comments Bleeding Signs/Symptoms: NO  Thromboembolic Signs/Symptoms: NO     Medication Changes:  Valtrex started 11/6/18  Dietary Changes:  NO  Bacterial/Viral Infection: Has Shingles since 11/1/18     Missed Coumadin Doses: NO  Other Concerns:  NO             OBJECTIVE    INR Protime   Date Value Ref Range Status   11/13/2018 4.1 (A) 0.86 - 1.14 Final       ASSESSMENT / PLAN  INR assessment SUPRA    Recheck INR In: 1 WEEK    INR Location Clinic      Anticoagulation Summary as of 11/13/2018     INR goal 2.0-3.0   Today's INR 4.1!   Warfarin maintenance plan 5 mg (5 mg x 1) every day   Full warfarin instructions 11/14: Hold; 11/15: 2.5 mg; Otherwise 5 mg every day   Weekly warfarin total 35 mg   Plan last modified Moon Lobato RN (11/1/2017)   Next INR check 11/23/2018   Priority INR   Target end date Indefinite    Indications   Paroxysmal atrial fibrillation (H) [I48.0]  Long-term (current) use of anticoagulants [Z79.01] [Z79.01]         Anticoagulation Episode Summary     INR check location     Preferred lab     Send INR reminders to [facility] CLINIC    Comments       Anticoagulation Care Providers     Provider Role Specialty Phone number    Adela Morgan MD [facility] Internal Medicine [phone]            See the Encounter Report to view Anticoagulation Flowsheet and Dosing Calendar (Go to Encounters tab in chart review, and find the Anticoagulation Therapy Visit)    Dosage adjustment made based on physician directed care plan.    Moon Lobato RN

## 2018-11-23 ENCOUNTER — ANTICOAGULATION THERAPY VISIT (OUTPATIENT)
Dept: NURSING | Facility: CLINIC | Age: 83
End: 2018-11-23
Payer: COMMERCIAL

## 2018-11-23 DIAGNOSIS — I48.0 PAROXYSMAL ATRIAL FIBRILLATION (H): ICD-10-CM

## 2018-11-23 LAB — INR POINT OF CARE: 2.6 (ref 0.86–1.14)

## 2018-11-23 PROCEDURE — 99207 ZZC NO CHARGE NURSE ONLY: CPT

## 2018-11-23 PROCEDURE — 85610 PROTHROMBIN TIME: CPT | Mod: QW

## 2018-11-23 PROCEDURE — 36416 COLLJ CAPILLARY BLOOD SPEC: CPT

## 2018-11-23 NOTE — PROGRESS NOTES
ANTICOAGULATION FOLLOW-UP CLINIC VISIT    Patient Name:  Shannan Magdaleno  Date:  11/23/2018  Contact Type:  Face to Face    SUBJECTIVE:     Patient Findings     Positives No Problem Findings    Comments Bleeding Signs/Symptoms: NO  Thromboembolic Signs/Symptoms: NO     Medication Changes:  NO  Dietary Changes:  NO  Bacterial/Viral Infection: NO     Missed Coumadin Doses: NO  Other Concerns:  NO             OBJECTIVE    INR Protime   Date Value Ref Range Status   11/23/2018 2.6 (A) 0.86 - 1.14 Final       ASSESSMENT / PLAN  INR assessment THER    Recheck INR In: 3 WEEKS    INR Location Clinic      Anticoagulation Summary as of 11/23/2018     INR goal 2.0-3.0   Today's INR 2.6   Warfarin maintenance plan 5 mg (5 mg x 1) every day   Full warfarin instructions 5 mg every day   Weekly warfarin total 35 mg   No change documented Moon Lobato RN   Plan last modified Moon Lobato RN (11/1/2017)   Next INR check 12/14/2018   Priority INR   Target end date Indefinite    Indications   Paroxysmal atrial fibrillation (H) [I48.0]  Long-term (current) use of anticoagulants [Z79.01] [Z79.01]         Anticoagulation Episode Summary     INR check location     Preferred lab     Send INR reminders to [facility] CL[facility]    Comments       Anticoagulation Care Providers     Provider Role Specialty Phone number    Adela Morgan MD [facility] Internal Medicine [phone]            See the Encounter Report to view Anticoagulation Flowsheet and Dosing Calendar (Go to Encounters tab in chart review, and find the Anticoagulation Therapy Visit)    Dosage adjustment made based on physician directed care plan.    Moon Lobato RN

## 2018-11-23 NOTE — MR AVS SNAPSHOT
Shannan Churchillcheco   11/23/2018 10:45 AM   Anticoagulation Therapy Visit    Description:  86 year old female   Provider:  [facility]ARA ANTI COAG   Department:  [facility]           INR as of 11/23/2018     Today's INR 2.6      Anticoagulation Summary as of 11/23/2018     INR goal 2.0-3.0   Today's INR 2.6   Full warfarin instructions 5 mg every day   Next INR check 12/14/2018    Indications   Paroxysmal atrial fibrillation (H) [I48.0]  Long-term (current) use of anticoagulants [Z79.01] [Z79.01]         Your next Anticoagulation Clinic appointment(s)     Nov 23, 2018 10:45 AM CST   Anticoagulation Visit with  ANTI COAG   [facility] ([address] [id number] [phone]            Dec 14, 2018 10:45 AM CST   Anticoagulation Visit with DARA ANTI COAG   [facility] ([place] [id number] [facility] [id number] [phone]              Contact Numbers     [facility]  Please call [phone] to cancel and/or reschedule your appointment   Please call [phone] with any problems or questions regarding your therapy.        November 2018 Details    Sun Mon Tue Wed Thu Fri Sat         1               2               3                 4               5               6               7               8               9               10                 11               12               13               14               15               16               17                 18               19               20               21               22               23      5 mg   See details      24      5 mg           25      5 mg         26      5 mg         27      5 mg         28      5 mg         29      5 mg         30      5 mg           Date Details   11/23 This INR check               How to take your warfarin dose     To take:  5 mg Take 1 of the 5 mg tablets.           December 2018 Details    Sun Mon Tue Wed u Fri Sat            1      5 mg           2      5 mg         3      5 mg         4      5 mg         5      5 mg         6      5 mg         7      5 mg         8      5 mg           9      5 mg         10      5 mg         11      5 mg         12      5 mg         13      5 mg         14            15                 16               17               18               19               20               21               22                 23               24               25               26               27               28               29                 30               31                     Date Details   No additional details    Date of next INR:  12/14/2018         How to take your warfarin dose     To take:  5 mg Take 1 of the 5 mg tablets.

## 2018-12-11 ENCOUNTER — TELEPHONE (OUTPATIENT)
Dept: FAMILY MEDICINE | Facility: CLINIC | Age: 83
End: 2018-12-11

## 2018-12-11 ENCOUNTER — ANCILLARY PROCEDURE (OUTPATIENT)
Dept: GENERAL RADIOLOGY | Facility: CLINIC | Age: 83
End: 2018-12-11
Attending: FAMILY MEDICINE
Payer: COMMERCIAL

## 2018-12-11 ENCOUNTER — OFFICE VISIT (OUTPATIENT)
Dept: FAMILY MEDICINE | Facility: CLINIC | Age: 83
End: 2018-12-11
Payer: COMMERCIAL

## 2018-12-11 VITALS
DIASTOLIC BLOOD PRESSURE: 63 MMHG | TEMPERATURE: 97.3 F | OXYGEN SATURATION: 94 % | HEART RATE: 65 BPM | BODY MASS INDEX: 26.34 KG/M2 | SYSTOLIC BLOOD PRESSURE: 112 MMHG | WEIGHT: 144 LBS

## 2018-12-11 DIAGNOSIS — R06.2 WHEEZING: ICD-10-CM

## 2018-12-11 DIAGNOSIS — J20.9 ACUTE BRONCHITIS, UNSPECIFIED ORGANISM: ICD-10-CM

## 2018-12-11 DIAGNOSIS — R05.9 COUGH: Primary | ICD-10-CM

## 2018-12-11 DIAGNOSIS — R05.9 COUGH: ICD-10-CM

## 2018-12-11 PROCEDURE — 99214 OFFICE O/P EST MOD 30 MIN: CPT | Mod: 25 | Performed by: FAMILY MEDICINE

## 2018-12-11 PROCEDURE — 94640 AIRWAY INHALATION TREATMENT: CPT | Performed by: FAMILY MEDICINE

## 2018-12-11 PROCEDURE — 71046 X-RAY EXAM CHEST 2 VIEWS: CPT | Mod: FY

## 2018-12-11 RX ORDER — ALBUTEROL SULFATE 90 UG/1
2 AEROSOL, METERED RESPIRATORY (INHALATION) EVERY 6 HOURS PRN
Qty: 1 INHALER | Refills: 0 | Status: CANCELLED | OUTPATIENT
Start: 2018-12-11 | End: 2019-12-11

## 2018-12-11 RX ORDER — ALBUTEROL SULFATE 0.83 MG/ML
2.5 SOLUTION RESPIRATORY (INHALATION) ONCE
Status: DISCONTINUED | OUTPATIENT
Start: 2018-12-11 | End: 2020-06-30

## 2018-12-11 RX ORDER — PREDNISONE 20 MG/1
40 TABLET ORAL DAILY
Qty: 10 TABLET | Refills: 0 | Status: CANCELLED | OUTPATIENT
Start: 2018-12-11 | End: 2018-12-16

## 2018-12-11 NOTE — PATIENT INSTRUCTIONS
Patient Education     Acute Bronchitis  Your healthcare provider has told you that you have acute bronchitis. Bronchitis is infection or inflammation of the bronchial tubes (airways in the lungs). Normally, air moves easily in and out of the airways. Bronchitis narrows the airways, making it harder for air to flow in and out of the lungs. This causes symptoms such as shortness of breath, coughing up yellow or green mucus, and wheezing. Bronchitis can be acute or chronic. Acute means the condition comes on quickly and goes away in a short time, usually within 3 to 10 days. Chronic means a condition lasts a long time and often comes back.    What causes acute bronchitis?  Acute bronchitis almost always starts as a viral respiratory infection, such as a cold or the flu. Certain factors make it more likely for a cold or flu to turn into bronchitis. These include being very young, being elderly, having a heart or lung problem, or having a weak immune system. Cigarette smoking also makes bronchitis more likely.  When bronchitis develops, the airways become swollen. The airways may also become infected with bacteria. This is known as a secondary infection.  Diagnosing acute bronchitis  Your healthcare provider will examine you and ask about your symptoms and health history. You may also have a sputum culture to test the fluid in your lungs. Chest X-rays may be done to look for infection in the lungs.  Treating acute bronchitis  Bronchitis usually clears up as the cold or flu goes away. You can help feel better faster by doing the following:    Take medicine as directed. You may be told to take ibuprofen or other over-the-counter medicines. These help relieve inflammation in your bronchial tubes. Your healthcare provider may prescribe an inhaler to help open up the bronchial tubes. Most of the time, acute bronchitis is caused by a viral infection. Antibiotics are usually not prescribed for viral infections.    Drink  plenty of fluids, such as water, juice, or warm soup. Fluids loosen mucus so that you can cough it up. This helps you breathe more easily. Fluids also prevent dehydration.    Make sure you get plenty of rest.    Do not smoke. Do not allow anyone else to smoke in your home.  Recovery and follow-up  Follow up with your doctor as you are told. You will likely feel better in a week or two. But a dry cough can linger beyond that time. Let your doctor know if you still have symptoms (other than a dry cough) after 2 weeks, or if you re prone to getting bronchial infections. Take steps to protect yourself from future infections. These steps include stopping smoking and avoiding tobacco smoke, washing your hands often, and getting a yearly flu shot.  When to call your healthcare provider  Call the healthcare provider if you have any of the following:    Fever of 100.4 F (38.0 C) or higher, or as advised    Symptoms that get worse, or new symptoms    Trouble breathing    Symptoms that don t start to improve within a week, or within 3 days of taking antibiotics   Date Last Reviewed: 12/1/2016 2000-2018 [employer]. [address] [place] [address], [place] [address]. All rights reserved. This information is not intended as a substitute for professional medical care. Always follow your healthcare professional's instructions.

## 2018-12-11 NOTE — PROGRESS NOTES
SUBJECTIVE:   Shannan Magdaleno is a 87 year old female who presents to clinic today for the following health issues:    Acute Illness   Acute illness concerns: Cough  Onset: x 1 week    Fever: no    Chills/Sweats: YES- Chills    Headache (location?): no     Sinus Pressure:no    Conjunctivitis:  no    Ear Pain: no    Rhinorrhea: YES    Congestion: YES- Some    Sore Throat: no      Cough: YES-productive of yellowish sputum    Wheeze: YES- Maybe?    Decreased Appetite: YES    Nausea: no     Vomiting: no     Diarrhea:  YES    Dysuria/Freq.: no     Fatigue/Achiness: YES- Fatigue    Sick/Strep Exposure: YES-      Therapies Tried and outcome: Tussin DM - Thinks it was working  Patient is not sob     O: /63 (BP Location: Right arm, Patient Position: Sitting, Cuff Size: Adult Regular)   Pulse 65   Temp 97.3  F (36.3  C) (Oral)   Wt 65.3 kg (144 lb)   SpO2 94%   BMI 26.34 kg/m      In NAD   She is not breathing rapidly     HEENT: Nontender sinuses  Ears are normal  No nasal blockage  Pharynx is negative  No adenopathy      Lungs: Patient has bilateral wheezing present  I gave the patient albuterol nebulizer treatment and her wheezing seemed to clear  Patient did not think she felt any better after this  X-ray examination seems us show some prominence in the right lower lung along the heart, radiology read this as normal.  No rales in the lungs    Albuterol 2.5mg/3ml neb was given  NDC# 4654-0250-58      ICD-10-CM    1. Cough R05 XR Chest 2 Views   2. Wheezing R06.2 albuterol (PROVENTIL) neb solution 2.5 mg     INHALATION/NEBULIZER TREATMENT, INITIAL   3. Acute bronchitis, unspecified organism J20.9      I did call patient with her x-ray results.  I told her that they were normal however she probably had bronchitis.  The next treatment for this if she is needing something beyond over-the-counter cough medicine is for her to either have the albuterol inhaler to help the wheezing or prednisone probably a low  dose like 40 mg once a day for 5 days that she should take for worsening wheezing.  If she develops a fever she should return

## 2018-12-11 NOTE — TELEPHONE ENCOUNTER
Reason for Call:  Other     Detailed comments: patient said that she had a message from Dr. HENDERSON and she would like a call back about the appointment that she had today     Phone Number Patient can be reached at: Home number on file [phone] (home)    Best Time: any    Can we leave a detailed message on this number? YES    Call taken on 12/11/2018 at 4:07 PM by Jennifer Pendleton

## 2018-12-12 ENCOUNTER — MYC MEDICAL ADVICE (OUTPATIENT)
Dept: INTERNAL MEDICINE | Facility: CLINIC | Age: 83
End: 2018-12-12

## 2018-12-13 ENCOUNTER — MYC MEDICAL ADVICE (OUTPATIENT)
Dept: INTERNAL MEDICINE | Facility: CLINIC | Age: 83
End: 2018-12-13

## 2018-12-13 NOTE — TELEPHONE ENCOUNTER
PARMJIT- Dr. Morgan is not in today.    Called [facility] and spoke with Elza.  Dr. Potts and his MA are both in with another patient at this time  Elza will relay the message and ask Dr. Potts's team please address this today    Tania Hall RN

## 2018-12-13 NOTE — TELEPHONE ENCOUNTER
Tania from Dr. Morgan's office wanting to talk with Dr. HENDERSON's team.  No one available at the time of her call.  She would [facility]END[facility] as the patient is worsening.  Dr. Morgan has not seen this patient and they believe that Dr. HENDERSON has a plan of care for this patient.   Thank you.    Andreina AVERY  Patient Representative  [place]

## 2018-12-13 NOTE — TELEPHONE ENCOUNTER
Patient saw Dr. Potts on 12/11/18  Per Dr. Potts's OV notes, albuterol or prednisone would be considered if worsening symptoms?    Routing to Dr. Potts's team  Please see mychart message below as well as mychart encounter 12/12/18    Tania Hall RN

## 2018-12-14 ENCOUNTER — TELEPHONE (OUTPATIENT)
Dept: FAMILY MEDICINE | Facility: CLINIC | Age: 83
End: 2018-12-14

## 2018-12-14 DIAGNOSIS — J40 BRONCHITIS: Primary | ICD-10-CM

## 2018-12-14 RX ORDER — PREDNISONE 20 MG/1
20 TABLET ORAL 2 TIMES DAILY
Qty: 10 TABLET | Refills: 0 | Status: SHIPPED | OUTPATIENT
Start: 2018-12-14 | End: 2019-06-03

## 2018-12-14 NOTE — TELEPHONE ENCOUNTER
Notified patient of the message below per provider.  Patient stated understanding and agreeable with the plan of care. Jennifer Tolentino, RN-BSN, [facility] Triage.

## 2018-12-14 NOTE — TELEPHONE ENCOUNTER
I spoke with the patient last night   I will add the prednisone   Please call patient to let her know.

## 2018-12-14 NOTE — TELEPHONE ENCOUNTER
Patient is not worse   She has been up and around   She has been able to get up some phlegm   Patient does not feel like she wants to have the prednisone or the inhaler.

## 2018-12-14 NOTE — TELEPHONE ENCOUNTER
If patient is sob then she should go to ER or Urgent care.  Otherwise I can call over some prednisone and an albuterol inhaler.  I have done that for the patient

## 2018-12-19 ENCOUNTER — ANCILLARY PROCEDURE (OUTPATIENT)
Dept: GENERAL RADIOLOGY | Facility: CLINIC | Age: 83
End: 2018-12-19
Attending: NURSE PRACTITIONER
Payer: COMMERCIAL

## 2018-12-19 ENCOUNTER — TELEPHONE (OUTPATIENT)
Dept: FAMILY MEDICINE | Facility: CLINIC | Age: 83
End: 2018-12-19

## 2018-12-19 ENCOUNTER — OFFICE VISIT (OUTPATIENT)
Dept: URGENT CARE | Facility: URGENT CARE | Age: 83
End: 2018-12-19
Payer: COMMERCIAL

## 2018-12-19 VITALS
BODY MASS INDEX: 26.41 KG/M2 | WEIGHT: 144.4 LBS | HEART RATE: 76 BPM | DIASTOLIC BLOOD PRESSURE: 64 MMHG | TEMPERATURE: 98.2 F | SYSTOLIC BLOOD PRESSURE: 101 MMHG | OXYGEN SATURATION: 95 %

## 2018-12-19 DIAGNOSIS — J18.9 PNEUMONIA OF RIGHT LOWER LOBE DUE TO INFECTIOUS ORGANISM: ICD-10-CM

## 2018-12-19 DIAGNOSIS — R05.3 COUGH, PERSISTENT: Primary | ICD-10-CM

## 2018-12-19 PROCEDURE — 99214 OFFICE O/P EST MOD 30 MIN: CPT | Performed by: NURSE PRACTITIONER

## 2018-12-19 PROCEDURE — 71046 X-RAY EXAM CHEST 2 VIEWS: CPT | Mod: FY

## 2018-12-19 RX ORDER — DOXYCYCLINE 100 MG/1
100 CAPSULE ORAL 2 TIMES DAILY
Qty: 14 CAPSULE | Refills: 0 | Status: SHIPPED | OUTPATIENT
Start: 2018-12-19 | End: 2019-06-03

## 2018-12-19 ASSESSMENT — ENCOUNTER SYMPTOMS
FATIGUE: 1
COUGH: 1

## 2018-12-19 NOTE — PROGRESS NOTES
SUBJECTIVE:   Shannan Magdaleno is a 87 year old female presenting with a chief complaint of   Chief Complaint   Patient presents with     Cough     Patient complains of weakness and bad cough       She is an established patient of [facility].    UR Adult    Onset of symptoms was 2 week(s) ago.  Course of illness is worsening.    Severity moderate  Current and Associated symptoms: cough - productive and fatigue  Treatment measures tried include prednisone.  Predisposing factors include None.  Seen 12/11/2018 and treated for acute bronchitis and wheezing, was given prednisone      Review of Systems   Constitutional: Positive for fatigue.   Respiratory: Positive for cough.    All other systems reviewed and are negative.      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     MI (myocardial infarction) (H) 1998    nl stress test in 5/08     OA (osteoarthritis) of knee 4/16/2014     osteoarthritis      Osteopenia      Other and unspecified hyperlipidemia      PVC's 9/00    Event Monitor     Restless legs      Retroperitoneal lymphadenopathy 7/7/2015     Family History   Problem Relation Age of Onset     Respiratory Mother      Heart Disease Father      Arthritis Sister      Obesity Sister      Heart Disease Sister      Hypertension Sister      Heart Disease Son      Neurologic Disorder Son         migraines     Arthritis Sister      Heart Disease Daughter      Heart Disease Daughter      Macular Degeneration Daughter      Neurologic Disorder Daughter         migraines     Neurologic Disorder Daughter         migraines     Cancer Paternal Aunt      Macular Degeneration Daughter      Diabetes No family hx of      Cerebrovascular Disease No family hx of      Thyroid Disease No  family hx of      Glaucoma No family hx of      Current Outpatient Medications   Medication Sig Dispense Refill     acetaminophen (TYLENOL) 500 MG tablet Take 1-2 tablets by mouth every 6 hours as needed.       ASPIRIN NOT PRESCRIBED (INTENTIONAL) Please choose reason not prescribed, below 0 each 0     calcium-vitamin D (CALTRATE) 600-400 MG-UNIT per tablet Take 1 tablet by mouth daily       diazepam (VALIUM) 2 MG tablet TAKE 1/2 TO 1 TABLET BY MOUTH BEDTIME AS NEEDED FOR ANXIETY 60 tablet 0     doxycycline hyclate (VIBRAMYCIN) 100 MG capsule Take 1 capsule (100 mg) by mouth 2 times daily for 7 days 14 capsule 0     escitalopram (LEXAPRO) 10 MG tablet TAKE 1&1/2 TABLETS (15 MG) BY MOUTH DAILY 135 tablet 0     metoprolol tartrate (LOPRESSOR) 25 MG tablet TAKE 1.5 TABLETS (37.5 MG) BY MOUTH 2 TIMES DAILY 270 tablet 1     Multiple Vitamins-Minerals (CENTRUM SILVER) per tablet Take 1 tablet by mouth daily Has 2000 IU of Vitamin D in it. 30 tablet      predniSONE (DELTASONE) 20 MG tablet Take 20 mg by mouth 2 times daily for 5 days. 10 tablet 0     simvastatin (ZOCOR) 40 MG tablet TAKE 1 TABLET (40 MG) BY MOUTH AT BEDTIME 90 tablet 3     warfarin (COUMADIN) 5 MG tablet TAKE 1 & 1/2 TABLETS ON WED, AND 1 TAB REST OF WEEK 90 tablet 0     escitalopram (LEXAPRO) 10 MG tablet TAKE 1&1/2 TABLETS (15 MG) BY MOUTH DAILY (Patient not taking: Reported on 11/6/2018) 135 tablet 1     fluticasone (FLONASE) 50 MCG/ACT spray Spray 2 sprays into both nostrils daily (Patient not taking: Reported on 12/11/2018) 16 g 1     HYDROcodone-acetaminophen (NORCO) 5-325 MG per tablet Take 1 tablet by mouth every 6 hours as needed for severe pain (Patient not taking: Reported on 12/11/2018) 15 tablet 0     nitroGLYcerin (NITROSTAT) 0.4 MG sublingual tablet Place 1 tablet (0.4 mg) under the tongue every 5 minutes as needed for chest pain (Patient not taking: Reported on 12/11/2018) 25 tablet 3     valACYclovir (VALTREX) 1000 mg tablet Take 1 tablet  (1,000 mg) by mouth 3 times daily (Patient not taking: Reported on 12/11/2018) 21 tablet 0     Social History     Tobacco Use     Smoking status: Never Smoker     Smokeless tobacco: Never Used   Substance Use Topics     Alcohol use: No     Alcohol/week: 0.0 oz       OBJECTIVE  /64 (BP Location: Left arm, Patient Position: Chair, Cuff Size: Adult Regular)   Pulse 76   Temp 98.2  F (36.8  C) (Oral)   Wt 65.5 kg (144 lb 6.4 oz)   SpO2 95%   BMI 26.41 kg/m      Physical Exam   HENT:   Right Ear: Tympanic membrane normal.   Left Ear: Tympanic membrane normal.   Mouth/Throat: Uvula is midline, oropharynx is clear and moist and mucous membranes are normal.   Cardiovascular: Normal rate and normal heart sounds.   Pulmonary/Chest: Effort normal. She has rales.   Rhonchi that clears with cough   Neurological: She is alert.   Psychiatric: She has a normal mood and affect.       Labs:  Results for orders placed or performed in visit on 12/19/18 (from the past 24 hour(s))   XR Chest 2 Views    Narrative    CHEST TWO VIEWS December 19, 2018 1:26 PM     HISTORY: Cough, persistent.    COMPARISON: 12/11/2018.    FINDINGS: The heart is at the upper limits of normal in size. There is  no pulmonary edema. Thoracic aorta is calcified and mildly tortuous.  Rounded density at left lung base likely relates to an anterior rib  end. There is mild infiltrate in the right lung base laterally. Lungs  are otherwise clear. No pneumothorax or pleural effusion. Degenerative  disease in the thoracic spine.       Impression    IMPRESSION: Mild right basilar pneumonia.         ASSESSMENT:      ICD-10-CM    1. Cough, persistent R05 XR Chest 2 Views   2. Pneumonia of right lower lobe due to infectious organism (H) J18.1 doxycycline hyclate (VIBRAMYCIN) 100 MG capsule        PLAN:  I have discussed clinical findings with patient.  Advised to see INR nurse in 2 days due to warfarin and doxycycline interraction.  Side effects of medications  discussed.  Symptomatic care is discussed.  I have discussed the possibility of  worsening symptoms and to RTC or ER if they occur.  All questions are answered and patient is in agreement with plan.   Patient care instructions are given to at the end of visit.    Patient Instructions       Patient Education     When You Have Pneumonia  You have been diagnosed with pneumonia. This is a serious lung infection. Most cases of pneumonia are caused by bacteria. Pneumonia most often occurs in older adults, young children, and people with chronic health problems.  Home care    Take your medicine exactly as directed. Don t skip doses. Continue taking your antibiotics as until they are all gone, even if you start to feel better. This will prevent the pneumonia from coming back.    Drink at least 8 glasses of water daily, unless directed otherwise. This helps to loosen and thin secretions so that you can cough them up.    Use a cool-mist humidifier in your bedroom. Be sure to clean the humidifier daily.    Don t use medicines to suppress your cough unless your cough is dry, painful, or interferes with your sleep. Coughing up mucus is normal. You may use an expectorant if your healthcare provider says it s okay.    You can use warm compresses or a heating pad on the lowest setting to relieve chest discomfort. Use several times a day for 15-20 minutes at a time. To prevent injury to your skin, set the temperature to warm, not hot. Don t put the compress or pad directly on your skin. Make certain it has a cover or wrap it in a towel. This is to prevent skin burns.    Get plenty of rest until your fever, shortness of breath, and chest pain go away.    Plan to get a flu shot every year. The flu is a common cause of pneumonia. Getting a flu shot every year can help prevent both the flu and pneumonia.  Getting the pneumococcal vaccine  Talk with your healthcare provider about getting the pneumococcal vaccine. Pneumococcal pneumonia  is caused by bacteria that spread from person to person. It can cause minor problems, such as ear infections. But it can also turn into life-threatening illnesses of the lungs (pneumonia), the covering of the brain and spinal cord (meningitis), and the blood (bacteremia).  Children under 2 years of age, adults over age 65, people with certain health conditions, and smokers are at the highest risk of pneumococcal disease. This vaccine can help prevent pneumococcal disease in both adults and children. Some people should not have the vaccine. Make sure to ask your healthcare provider if you should have the vaccine.   Follow-up care  Make a follow-up appointment as directed by our staff.  When to call your healthcare provider  Call your healthcare provider right away if you have any of the following:    Fever of 100.4 F (38 C) or higher, or as directed by your healthcare provider    Mucus from the lungs (sputum) that s yellow, green, bloody, or smells bad    A large amount of sputum    Vomiting    Symptoms that get worse  Call 911  Call 911 right away if you have any of the following:    Chest pain    Trouble breathing    Blue lips or fingernails   Date Last Reviewed: 11/1/2016 2000-2018 [employer]. [address] [place] [address], [place] [address]. All rights reserved. This information is not intended as a substitute for professional medical care. Always follow your healthcare professional's instructions.

## 2018-12-19 NOTE — TELEPHONE ENCOUNTER
Reason for call:  Patient reporting a symptom    Symptom or request:  Cough and weak.     Duration (how long have symptoms been present):  2 wks    Have you been treated for this before? Yes    Additional comments:  Na     Phone Number patient can be reached at:  Home number on file [phone] (home)    Best Time:   Any     Can we leave a detailed message on this number:  YES    Call taken on 12/19/2018 at 9:25 AM by Jihan Avendaño

## 2018-12-19 NOTE — PATIENT INSTRUCTIONS
Patient Education     When You Have Pneumonia  You have been diagnosed with pneumonia. This is a serious lung infection. Most cases of pneumonia are caused by bacteria. Pneumonia most often occurs in older adults, young children, and people with chronic health problems.  Home care    Take your medicine exactly as directed. Don t skip doses. Continue taking your antibiotics as until they are all gone, even if you start to feel better. This will prevent the pneumonia from coming back.    Drink at least 8 glasses of water daily, unless directed otherwise. This helps to loosen and thin secretions so that you can cough them up.    Use a cool-mist humidifier in your bedroom. Be sure to clean the humidifier daily.    Don t use medicines to suppress your cough unless your cough is dry, painful, or interferes with your sleep. Coughing up mucus is normal. You may use an expectorant if your healthcare provider says it s okay.    You can use warm compresses or a heating pad on the lowest setting to relieve chest discomfort. Use several times a day for 15-20 minutes at a time. To prevent injury to your skin, set the temperature to warm, not hot. Don t put the compress or pad directly on your skin. Make certain it has a cover or wrap it in a towel. This is to prevent skin burns.    Get plenty of rest until your fever, shortness of breath, and chest pain go away.    Plan to get a flu shot every year. The flu is a common cause of pneumonia. Getting a flu shot every year can help prevent both the flu and pneumonia.  Getting the pneumococcal vaccine  Talk with your healthcare provider about getting the pneumococcal vaccine. Pneumococcal pneumonia is caused by bacteria that spread from person to person. It can cause minor problems, such as ear infections. But it can also turn into life-threatening illnesses of the lungs (pneumonia), the covering of the brain and spinal cord (meningitis), and the blood (bacteremia).  Children under 2  years of age, adults over age 65, people with certain health conditions, and smokers are at the highest risk of pneumococcal disease. This vaccine can help prevent pneumococcal disease in both adults and children. Some people should not have the vaccine. Make sure to ask your healthcare provider if you should have the vaccine.   Follow-up care  Make a follow-up appointment as directed by our staff.  When to call your healthcare provider  Call your healthcare provider right away if you have any of the following:    Fever of 100.4 F (38 C) or higher, or as directed by your healthcare provider    Mucus from the lungs (sputum) that s yellow, green, bloody, or smells bad    A large amount of sputum    Vomiting    Symptoms that get worse  Call 911  Call 911 right away if you have any of the following:    Chest pain    Trouble breathing    Blue lips or fingernails   Date Last Reviewed: 11/1/2016 2000-2018 [employer]. [address] [place] [address], [place] [address]. All rights reserved. This information is not intended as a substitute for professional medical care. Always follow your healthcare professional's instructions.

## 2018-12-20 NOTE — TELEPHONE ENCOUNTER
Patient was seen at  yesterday and being treated with abx for pneumonia    Called Minerva   She stated that patient is still weak but only started the abx late yesterday evening.  Per  provider's note, patient is to be seen by INR nurse after 2 days.  Only openings are 1:15PM and 1:30PM tomorrow on INR schedule at [facility]  Minerva stated that she will need to be at a  around 1:30PM tomorrow and patient will not have a ride at that time  Minerva would like to know if patient can be seen sooner or if INR nurse feels this can wait until next week    Please call Min[facility]a back to discuss further    Tania Hall RN

## 2018-12-21 ENCOUNTER — ANTICOAGULATION THERAPY VISIT (OUTPATIENT)
Dept: NURSING | Facility: CLINIC | Age: 83
End: 2018-12-21
Payer: COMMERCIAL

## 2018-12-21 ENCOUNTER — TELEPHONE (OUTPATIENT)
Dept: FAMILY MEDICINE | Facility: CLINIC | Age: 83
End: 2018-12-21

## 2018-12-21 DIAGNOSIS — I48.0 PAROXYSMAL ATRIAL FIBRILLATION (H): ICD-10-CM

## 2018-12-21 LAB — INR POINT OF CARE: 3.3 (ref 0.86–1.14)

## 2018-12-21 PROCEDURE — 36416 COLLJ CAPILLARY BLOOD SPEC: CPT

## 2018-12-21 PROCEDURE — 99207 ZZC NO CHARGE NURSE ONLY: CPT

## 2018-12-21 PROCEDURE — 85610 PROTHROMBIN TIME: CPT | Mod: QW

## 2018-12-21 NOTE — PROGRESS NOTES
ANTICOAGULATION FOLLOW-UP CLINIC VISIT    Patient Name:  Shannan Magdaleno  Date:  12/21/2018  Contact Type:  Face to Face    SUBJECTIVE:     Patient Findings     Positives:   Antibiotic use or infection, No Problem Findings    Comments:   Bleeding Signs/Symptoms: NO  Thromboembolic Signs/Symptoms: NO     Medication Changes:  Taking Doxycycline since 12/19 for 7 days  Dietary Changes:  Not eating as much  Bacterial/Viral Infection: YES     Missed Coumadin Doses: NO  Other Concerns:  NO             OBJECTIVE    INR Protime   Date Value Ref Range Status   12/21/2018 3.3 (A) 0.86 - 1.14 Final       ASSESSMENT / PLAN  No question data found.  Anticoagulation Summary  As of 12/21/2018    INR goal:   2.0-3.0   TTR:   65.5 % (2.7 y)   INR used for dosing:   3.3! (12/21/2018)   Warfarin maintenance plan:   5 mg (5 mg x 1) every day   Full warfarin instructions:   5 mg every day   Weekly warfarin total:   35 mg   No change documented:   Moon Lobato RN   Plan last modified:   Moon Lobato RN (11/1/2017)   Next INR check:   1/4/2019   Priority:   INR   Target end date:   Indefinite    Indications    Paroxysmal atrial fibrillation (H) [I48.0]  Long-term (current) use of anticoagulants [Z79.01] [Z79.01]             Anticoagulation Episode Summary     INR check location:       Preferred lab:       Send INR reminders to:   [facility]    Comments:         Anticoagulation Care Providers     Provider Role Specialty Phone number    Adela Morgan MD [facility] Internal Medicine [phone]            See the Encounter Report to view Anticoagulation Flowsheet and Dosing Calendar (Go to Encounters tab in chart review, and find the Anticoagulation Therapy Visit)        Moon Lobato RN

## 2018-12-21 NOTE — PATIENT INSTRUCTIONS
[facility]:  Please call [phone] to cancel and/or reschedule your appointment   Please call [phone] with any problems or questions regarding your Warfarin therapy.

## 2018-12-21 NOTE — TELEPHONE ENCOUNTER
Called and spoke with patient. Patient unsure when the call was from or who called.   No recent calls placed to patient.    Mandie Sanchez RN

## 2019-01-03 DIAGNOSIS — I48.0 PAROXYSMAL ATRIAL FIBRILLATION (H): ICD-10-CM

## 2019-01-03 DIAGNOSIS — F41.9 ANXIETY: ICD-10-CM

## 2019-01-03 RX ORDER — DIAZEPAM 2 MG
TABLET ORAL
Qty: 60 TABLET | Refills: 0 | Status: SHIPPED | OUTPATIENT
Start: 2019-01-03 | End: 2019-05-02

## 2019-01-03 NOTE — TELEPHONE ENCOUNTER
diazepam (VALIUM) 2 MG tablet      Last Written Prescription Date:  9/14/2018  Last Fill Quantity: 60,   # refills: 0  Last Office Visit: 11/6/2018  Future Office visit:       Routing refill request to provider for review/approval because:  Drug not on the [facility], [facility] or [facility] refill protocol or controlled substance  \

## 2019-01-03 NOTE — TELEPHONE ENCOUNTER
Valium prescription faxed to [facility] pharmacy at [phone].  Marifer Nieves,

## 2019-01-04 ENCOUNTER — ANTICOAGULATION THERAPY VISIT (OUTPATIENT)
Dept: NURSING | Facility: CLINIC | Age: 84
End: 2019-01-04
Payer: COMMERCIAL

## 2019-01-04 DIAGNOSIS — I48.0 PAROXYSMAL ATRIAL FIBRILLATION (H): ICD-10-CM

## 2019-01-04 LAB — INR POINT OF CARE: 3.2 (ref 0.86–1.14)

## 2019-01-04 PROCEDURE — 36416 COLLJ CAPILLARY BLOOD SPEC: CPT

## 2019-01-04 PROCEDURE — 85610 PROTHROMBIN TIME: CPT | Mod: QW

## 2019-01-04 PROCEDURE — 99207 ZZC NO CHARGE NURSE ONLY: CPT

## 2019-01-04 RX ORDER — WARFARIN SODIUM 5 MG/1
5 TABLET ORAL DAILY
Qty: 90 TABLET | Refills: 0 | Status: SHIPPED | OUTPATIENT
Start: 2019-01-04 | End: 2019-03-30

## 2019-01-04 NOTE — PATIENT INSTRUCTIONS
[facility]:  Please call [phone] to cancel and/or reschedule your appointment   Please call [phone] with any problems or questions regarding your Warfarin therapy.

## 2019-01-04 NOTE — PROGRESS NOTES
ANTICOAGULATION FOLLOW-UP CLINIC VISIT    Patient Name:  Shannan Magdaleno  Date:  1/4/2019  Contact Type:  Face to Face    SUBJECTIVE:     Patient Findings     Positives:   No Problem Findings    Comments:   Bleeding Signs/Symptoms: NO  Thromboembolic Signs/Symptoms: NO     Medication Changes:  NO  Dietary Changes:  NO  Bacterial/Viral Infection: NO     Missed Coumadin Doses: NO  Other Concerns:  Due to 2 supra therapeutic INR's maintenance dose was decreased by 2.5 mg per week             OBJECTIVE    INR Protime   Date Value Ref Range Status   01/04/2019 3.2 (A) 0.86 - 1.14 Final       ASSESSMENT / PLAN  INR assessment SUPRA    Recheck INR In: 2 WEEKS    INR Location Clinic      Anticoagulation Summary  As of 1/4/2019    INR goal:   2.0-3.0   TTR:   64.5 % (2.8 y)   INR used for dosing:   3.2! (1/4/2019)   Warfarin maintenance plan:   2.5 mg (5 mg x 0.5) every Fri; 5 mg (5 mg x 1) all other days   Full warfarin instructions:   2.5 mg every Fri; 5 mg all other days   Weekly warfarin total:   32.5 mg   Plan last modified:   Moon Lobato RN (1/4/2019)   Next INR check:   1/17/2019   Priority:   INR   Target end date:   Indefinite    Indications    Paroxysmal atrial fibrillation (H) [I48.0]  Long-term (current) use of anticoagulants [Z79.01] [Z79.01]             Anticoagulation Episode Summary     INR check location:       Preferred lab:       Send INR reminders to:   [facility]    Comments:         Anticoagulation Care Providers     Provider Role Specialty Phone number    Adela Morgan MD [facility] Internal Medicine [phone]            See the Encounter Report to view Anticoagulation Flowsheet and Dosing Calendar (Go to Encounters tab in chart review, and find the Anticoagulation Therapy Visit)        Moon Lobato RN

## 2019-01-17 ENCOUNTER — ANTICOAGULATION THERAPY VISIT (OUTPATIENT)
Dept: NURSING | Facility: CLINIC | Age: 84
End: 2019-01-17
Payer: COMMERCIAL

## 2019-01-17 ENCOUNTER — TRANSFERRED RECORDS (OUTPATIENT)
Dept: HEALTH INFORMATION MANAGEMENT | Facility: CLINIC | Age: 84
End: 2019-01-17

## 2019-01-17 DIAGNOSIS — I48.0 PAROXYSMAL ATRIAL FIBRILLATION (H): ICD-10-CM

## 2019-01-17 DIAGNOSIS — Z23 NEED FOR PROPHYLACTIC VACCINATION AND INOCULATION AGAINST INFLUENZA: Primary | ICD-10-CM

## 2019-01-17 LAB — INR POINT OF CARE: 3.6 (ref 0.86–1.14)

## 2019-01-17 PROCEDURE — 99207 ZZC NO CHARGE NURSE ONLY: CPT

## 2019-01-17 PROCEDURE — G0008 ADMIN INFLUENZA VIRUS VAC: HCPCS

## 2019-01-17 PROCEDURE — 36416 COLLJ CAPILLARY BLOOD SPEC: CPT

## 2019-01-17 PROCEDURE — 85610 PROTHROMBIN TIME: CPT | Mod: QW

## 2019-01-17 PROCEDURE — 90662 IIV NO PRSV INCREASED AG IM: CPT

## 2019-01-17 NOTE — PATIENT INSTRUCTIONS
[facility]:  Please call [phone] to cancel and/or reschedule your appointment   Please call [phone] with any problems or questions regarding your Warfarin therapy.    Some signs and symptoms of bleeding include: Nose bleed or cut that does not stop bleeding in 10 minutes, bleeding of the gums, vomiting (will look like coffee grounds) or coughing up blood, unusual, easy or large areas of bruising, increased or unexpected vaginal bleeding or increased menstrual flow, red or black stools, red or orange urine, prolonged or severe headache, pale skin, unusual or constant tiredness.  If you have these please call 911 or seek medical care immediately.

## 2019-01-17 NOTE — PROGRESS NOTES
Injectable Influenza Immunization Documentation    1.  Is the person to be vaccinated sick today?   No    2. Does the person to be vaccinated have an allergy to a component   of the vaccine?   No  Egg Allergy Algorithm Link    3. Has the person to be vaccinated ever had a serious reaction   to influenza vaccine in the past?   No    4. Has the person to be vaccinated ever had Guillain-Barré syndrome?   No    Form completed by Nataly Tolentino RN       ANTICOAGULATION FOLLOW-UP CLINIC VISIT    Patient Name:  Shannan Magdaleno  Date:  1/17/2019  Contact Type:  Face to Face    SUBJECTIVE:     Patient Findings     Positives:   Other complaints (Had shingles and pneumonia 2-3 weeks ago, but has cleared up), Unexplained INR or factor level change           OBJECTIVE    INR Protime   Date Value Ref Range Status   01/17/2019 3.6 (A) 0.86 - 1.14 Final       ASSESSMENT / PLAN  INR assessment SUPRA    Recheck INR In: 2 WEEKS    INR Location Clinic      Anticoagulation Summary  As of 1/17/2019    INR goal:   2.0-3.0   TTR:   63.7 % (2.8 y)   INR used for dosing:   3.6! (1/17/2019)   Warfarin maintenance plan:   2.5 mg (5 mg x 0.5) every Fri; 5 mg (5 mg x 1) all other days   Full warfarin instructions:   1/18: Hold; Otherwise 2.5 mg every Fri; 5 mg all other days   Weekly warfarin total:   32.5 mg   Plan last modified:   Moon Lobato, JUAN (1/4/2019)   Next INR check:   1/31/2019   Priority:   INR   Target end date:   Indefinite    Indications    Paroxysmal atrial fibrillation (H) [I48.0]  Long-term (current) use of anticoagulants [Z79.01] [Z79.01]             Anticoagulation Episode Summary     INR check location:       Preferred lab:       Send INR reminders to:   [facility]    Comments:         Anticoagulation Care Providers     Provider Role Specialty Phone number    Adela Morgan MD [facility] Internal Medicine [phone]            See the Encounter Report to view Anticoagulation Flowsheet and Dosing Calendar  (Go to Encounters tab in chart review, and find the Anticoagulation Therapy Visit)    Dosage adjustment made based on physician directed care plan.    Nataly Tolentino RN

## 2019-01-18 ENCOUNTER — DOCUMENTATION ONLY (OUTPATIENT)
Dept: OTHER | Facility: CLINIC | Age: 84
End: 2019-01-18

## 2019-02-06 ENCOUNTER — ANTICOAGULATION THERAPY VISIT (OUTPATIENT)
Dept: NURSING | Facility: CLINIC | Age: 84
End: 2019-02-06
Payer: COMMERCIAL

## 2019-02-06 DIAGNOSIS — I48.0 PAROXYSMAL ATRIAL FIBRILLATION (H): ICD-10-CM

## 2019-02-06 LAB — INR POINT OF CARE: 3.1 (ref 0.86–1.14)

## 2019-02-06 PROCEDURE — 36416 COLLJ CAPILLARY BLOOD SPEC: CPT

## 2019-02-06 PROCEDURE — 85610 PROTHROMBIN TIME: CPT | Mod: QW

## 2019-02-06 PROCEDURE — 99207 ZZC NO CHARGE NURSE ONLY: CPT

## 2019-02-06 NOTE — PATIENT INSTRUCTIONS
[facility]:  Please call [phone] to cancel and/or reschedule your appointment   Please call [phone] with any problems or questions regarding your Warfarin therapy.

## 2019-02-06 NOTE — PROGRESS NOTES
ANTICOAGULATION FOLLOW-UP CLINIC VISIT    Patient Name:  Shannan Magdaleno  Date:  2/6/2019  Contact Type:  Face to Face    SUBJECTIVE:     Patient Findings     Positives:   No Problem Findings    Comments:   Bleeding Signs/Symptoms: NO  Thromboembolic Signs/Symptoms: NO     Medication Changes:  NO  Dietary Changes:  NO  Bacterial/Viral Infection: NO     Missed Coumadin Doses: NO  Other Concerns:  NO             OBJECTIVE    INR Protime   Date Value Ref Range Status   02/06/2019 3.1 (A) 0.86 - 1.14 Final       ASSESSMENT / PLAN  No question data found.  Anticoagulation Summary  As of 2/6/2019    INR goal:   2.0-3.0   TTR:   62.5 % (2.9 y)   INR used for dosing:   3.1! (2/6/2019)   Warfarin maintenance plan:   2.5 mg (5 mg x 0.5) every Fri; 5 mg (5 mg x 1) all other days   Full warfarin instructions:   2.5 mg every Fri; 5 mg all other days   Weekly warfarin total:   32.5 mg   Plan last modified:   Moon Lobato RN (1/4/2019)   Next INR check:      Priority:   INR   Target end date:   Indefinite    Indications    Paroxysmal atrial fibrillation (H) [I48.0]  Long-term (current) use of anticoagulants [Z79.01] [Z79.01]             Anticoagulation Episode Summary     INR check location:       Preferred lab:       Send INR reminders to:   [facility]    Comments:         Anticoagulation Care Providers     Provider Role Specialty Phone number    Adela Morgan MD [facility] Internal Medicine [phone]            See the Encounter Report to view Anticoagulation Flowsheet and Dosing Calendar (Go to Encounters tab in chart review, and find the Anticoagulation Therapy Visit)        Moon Lobato RN

## 2019-02-11 DIAGNOSIS — F41.9 ANXIETY: ICD-10-CM

## 2019-02-11 RX ORDER — ESCITALOPRAM OXALATE 10 MG/1
TABLET ORAL
Qty: 135 TABLET | Refills: 0 | Status: SHIPPED | OUTPATIENT
Start: 2019-02-11 | End: 2019-06-03

## 2019-02-11 NOTE — TELEPHONE ENCOUNTER
"Prescription approved per [facility] Refill Protocol.    Requested Prescriptions   Signed Prescriptions Disp Refills     escitalopram (LEXAPRO) 10 MG tablet 135 tablet 0     Sig: TAKE 1&1/2 TABLETS (15 MG) BY MOUTH DAILY    SSRIs Protocol Passed - 2/11/2019  3:33 PM       Passed - Recent (12 mo) or future (30 days) visit within the authorizing provider's specialty    Patient had office visit in the last 12 months or has a visit in the next 30 days with authorizing provider or within the authorizing provider's specialty.  See \"Patient Info\" tab in inbasket, or \"Choose Columns\" in Meds & Orders section of the refill encounter.             Passed - Medication is active on med list       Passed - Patient is age 18 or older       Passed - No active pregnancy on record       Passed - No positive pregnancy test in last 12 months        Nataly Tolentino RN  [facility]    "

## 2019-02-11 NOTE — TELEPHONE ENCOUNTER
Requested Prescriptions   Pending Prescriptions Disp Refills     escitalopram (LEXAPRO) 10 MG tablet 135 tablet 0    There is no refill protocol information for this order

## 2019-02-27 ENCOUNTER — ANTICOAGULATION THERAPY VISIT (OUTPATIENT)
Dept: NURSING | Facility: CLINIC | Age: 84
End: 2019-02-27
Payer: COMMERCIAL

## 2019-02-27 DIAGNOSIS — I48.0 PAROXYSMAL ATRIAL FIBRILLATION (H): ICD-10-CM

## 2019-02-27 LAB — INR POINT OF CARE: 2.7 (ref 0.86–1.14)

## 2019-02-27 PROCEDURE — 99207 ZZC NO CHARGE NURSE ONLY: CPT

## 2019-02-27 PROCEDURE — 85610 PROTHROMBIN TIME: CPT | Mod: QW

## 2019-02-27 PROCEDURE — 36416 COLLJ CAPILLARY BLOOD SPEC: CPT

## 2019-02-27 NOTE — PROGRESS NOTES
ANTICOAGULATION FOLLOW-UP CLINIC VISIT    Patient Name:  Shannan Magdaleno  Date:  2019  Contact Type:  Face to Face    SUBJECTIVE:     Patient Findings     Positives:   No Problem Findings    Comments:   Bleeding Signs/Symptoms: NO  Thromboembolic Signs/Symptoms: NO     Medication Changes:  NO  Dietary Changes:  NO  Bacterial/Viral Infection: NO     Missed Coumadin Doses: NO  Other Concerns:  NO             OBJECTIVE    INR Protime   Date Value Ref Range Status   2019 2.7 (A) 0.86 - 1.14 Final       ASSESSMENT / PLAN  No question data found.  Anticoagulation Summary  As of 2019    INR goal:   2.0-3.0   TTR:   62.8 % (2.9 y)   INR used for dosin.7 (2019)   Warfarin maintenance plan:   2.5 mg (5 mg x 0.5) every Fri; 5 mg (5 mg x 1) all other days   Full warfarin instructions:   2.5 mg every Fri; 5 mg all other days   Weekly warfarin total:   32.5 mg   No change documented:   Moon Lobato RN   Plan last modified:   Moon Lobato RN (2019)   Next INR check:   3/27/2019   Priority:   INR   Target end date:   Indefinite    Indications    Paroxysmal atrial fibrillation (H) [I48.0]  Long-term (current) use of anticoagulants [Z79.01] [Z79.01]             Anticoagulation Episode Summary     INR check location:       Preferred lab:       Send INR reminders to:   [facility]    Comments:         Anticoagulation Care Providers     Provider Role Specialty Phone number    Adela Morgan MD [facility] Internal Medicine [phone]            See the Encounter Report to view Anticoagulation Flowsheet and Dosing Calendar (Go to Encounters tab in chart review, and find the Anticoagulation Therapy Visit)        Moon Lobato RN

## 2019-02-27 NOTE — PATIENT INSTRUCTIONS
[facility]:  Please call [phone] to cancel and/or reschedule your appointment   Please call [phone] with any problems or questions regarding your Warfarin therapy.

## 2019-03-06 DIAGNOSIS — I48.0 PAROXYSMAL ATRIAL FIBRILLATION (H): ICD-10-CM

## 2019-03-06 RX ORDER — METOPROLOL TARTRATE 25 MG/1
TABLET, FILM COATED ORAL
Qty: 270 TABLET | Refills: 1 | Status: SHIPPED | OUTPATIENT
Start: 2019-03-06 | End: 2019-08-25

## 2019-03-06 NOTE — TELEPHONE ENCOUNTER
"Pending Prescriptions:                       Disp   Refills    metoprolol tartrate (LOPRESSOR) 25 MG tab*270 ta*1            Sig: TAKE 1.5 TABLETS (37.5 MG) BY MOUTH 2 TIMES DAILY    RN refilled medication per [facility] Refill Protocol.     Mandi Pate RN      Requested Prescriptions   Pending Prescriptions Disp Refills     metoprolol tartrate (LOPRESSOR) 25 MG tablet [Pharmacy Med Name: METOPROLOL TARTRATE 25 MG TAB] 270 tablet 1     Sig: TAKE 1.5 TABLETS (37.5 MG) BY MOUTH 2 TIMES DAILY    Beta-Blockers Protocol Passed - 3/6/2019  7:19 AM       Passed - Blood pressure under 140/90 in past 12 months    BP Readings from Last 3 Encounters:   12/19/18 101/64   12/11/18 112/63   11/06/18 132/56                Passed - Patient is age 6 or older       Passed - Recent (12 mo) or future (30 days) visit within the authorizing provider's specialty    Patient had office visit in the last 12 months or has a visit in the next 30 days with authorizing provider or within the authorizing provider's specialty.  See \"Patient Info\" tab in inbasket, or \"Choose Columns\" in Meds & Orders section of the refill encounter.             Passed - Medication is active on med list          "

## 2019-03-27 ENCOUNTER — ANTICOAGULATION THERAPY VISIT (OUTPATIENT)
Dept: NURSING | Facility: CLINIC | Age: 84
End: 2019-03-27
Payer: COMMERCIAL

## 2019-03-27 DIAGNOSIS — I48.0 PAROXYSMAL ATRIAL FIBRILLATION (H): ICD-10-CM

## 2019-03-27 LAB — INR POINT OF CARE: 3.3 (ref 0.86–1.14)

## 2019-03-27 PROCEDURE — 36416 COLLJ CAPILLARY BLOOD SPEC: CPT

## 2019-03-27 PROCEDURE — 99207 ZZC NO CHARGE NURSE ONLY: CPT

## 2019-03-27 PROCEDURE — 85610 PROTHROMBIN TIME: CPT | Mod: QW

## 2019-03-27 NOTE — PROGRESS NOTES
ANTICOAGULATION FOLLOW-UP CLINIC VISIT    Patient Name:  Shannan Magdaleno  Date:  3/27/2019  Contact Type:  Face to Face    SUBJECTIVE:     Patient Findings     Comments:   Bleeding Signs/Symptoms: NO  Thromboembolic Signs/Symptoms: NO     Medication Changes:  NO  Dietary Changes:  NO  Bacterial/Viral Infection: NO     Missed Coumadin Doses: NO  Other Concerns:  NO             OBJECTIVE    INR Protime   Date Value Ref Range Status   03/27/2019 3.3 (A) 0.86 - 1.14 Final       ASSESSMENT / PLAN  No question data found.  Anticoagulation Summary  As of 3/27/2019    INR goal:   2.0-3.0   TTR:   62.4 % (3 y)   INR used for dosing:   3.3! (3/27/2019)   Warfarin maintenance plan:   2.5 mg (5 mg x 0.5) every Fri; 5 mg (5 mg x 1) all other days   Full warfarin instructions:   3/27: 2.5 mg; Otherwise 2.5 mg every Fri; 5 mg all other days   Weekly warfarin total:   32.5 mg   Plan last modified:   Moon Lobato RN (1/4/2019)   Next INR check:   4/10/2019   Priority:   INR   Target end date:   Indefinite    Indications    Paroxysmal atrial fibrillation (H) [I48.0]  Long-term (current) use of anticoagulants [Z79.01] [Z79.01]             Anticoagulation Episode Summary     INR check location:       Preferred lab:       Send INR reminders to:   [facility]    Comments:         Anticoagulation Care Providers     Provider Role Specialty Phone number    Adela Morgan MD [facility] Internal Medicine [phone]            See the Encounter Report to view Anticoagulation Flowsheet and Dosing Calendar (Go to Encounters tab in chart review, and find the Anticoagulation Therapy Visit)        Moon Lobato RN

## 2019-03-27 NOTE — PATIENT INSTRUCTIONS
[facility]:  Please call [phone] to cancel and/or reschedule your appointment   Please call [phone] with any problems or questions regarding your Warfarin therapy.    Some signs and symptoms of bleeding include: Nose bleed or cut that does not stop bleeding in 10 minutes, bleeding of the gums, vomiting (will look like coffee grounds) or coughing up blood, unusual, easy or large areas of bruising, increased or unexpected  Vaginal bleeding or increased menstrual flow, red or black stools, red or orange urine, prolonged or severe headache, pale skin, unusual or constant tiredness.  If you have these please call 911 or seek medical care immediately.

## 2019-03-30 DIAGNOSIS — I48.0 PAROXYSMAL ATRIAL FIBRILLATION (H): ICD-10-CM

## 2019-04-01 RX ORDER — WARFARIN SODIUM 5 MG/1
TABLET ORAL
Qty: 90 TABLET | Refills: 0 | Status: SHIPPED | OUTPATIENT
Start: 2019-04-01 | End: 2019-06-03

## 2019-04-10 ENCOUNTER — ANTICOAGULATION THERAPY VISIT (OUTPATIENT)
Dept: NURSING | Facility: CLINIC | Age: 84
End: 2019-04-10
Payer: COMMERCIAL

## 2019-04-10 DIAGNOSIS — I48.0 PAROXYSMAL ATRIAL FIBRILLATION (H): ICD-10-CM

## 2019-04-10 LAB — INR POINT OF CARE: 2.8 (ref 0.86–1.14)

## 2019-04-10 PROCEDURE — 85610 PROTHROMBIN TIME: CPT | Mod: QW

## 2019-04-10 PROCEDURE — 36416 COLLJ CAPILLARY BLOOD SPEC: CPT

## 2019-04-10 PROCEDURE — 99207 ZZC NO CHARGE NURSE ONLY: CPT

## 2019-04-10 NOTE — PATIENT INSTRUCTIONS
[facility]:  Please call [phone] to cancel and/or reschedule your appointment   Please call [phone] with any problems or questions regarding your Warfarin therapy.

## 2019-04-10 NOTE — PROGRESS NOTES
ANTICOAGULATION FOLLOW-UP CLINIC VISIT    Patient Name:  Shannan Magdaleno  Date:  4/10/2019  Contact Type:  Face to Face    SUBJECTIVE:     Patient Findings     Comments:   Bleeding Signs/Symptoms: NO  Thromboembolic Signs/Symptoms: NO     Medication Changes:  NO  Dietary Changes:  NO  Bacterial/Viral Infection: NO     Missed Coumadin Doses: NO  Other Concerns:  NO             OBJECTIVE    INR Protime   Date Value Ref Range Status   04/10/2019 2.8 (A) 0.86 - 1.14 Final       ASSESSMENT / PLAN  No question data found.  Anticoagulation Summary  As of 4/10/2019    INR goal:   2.0-3.0   TTR:   62.2 % (3 y)   INR used for dosin.8 (4/10/2019)   Warfarin maintenance plan:   2.5 mg (5 mg x 0.5) every Fri; 5 mg (5 mg x 1) all other days   Full warfarin instructions:   2.5 mg every Fri; 5 mg all other days   Weekly warfarin total:   32.5 mg   No change documented:   Moon Lobato RN   Plan last modified:   Moon Lobato RN (2019)   Next INR check:   2019   Priority:   INR   Target end date:   Indefinite    Indications    Paroxysmal atrial fibrillation (H) [I48.0]  Long-term (current) use of anticoagulants [Z79.01] [Z79.01]             Anticoagulation Episode Summary     INR check location:       Preferred lab:       Send INR reminders to:   [facility]    Comments:         Anticoagulation Care Providers     Provider Role Specialty Phone number    Adela Morgan MD [facility] Internal Medicine [phone]            See the Encounter Report to view Anticoagulation Flowsheet and Dosing Calendar (Go to Encounters tab in chart review, and find the Anticoagulation Therapy Visit)        Moon Lobato RN

## 2019-04-29 ENCOUNTER — ANTICOAGULATION THERAPY VISIT (OUTPATIENT)
Dept: NURSING | Facility: CLINIC | Age: 84
End: 2019-04-29
Payer: COMMERCIAL

## 2019-04-29 DIAGNOSIS — I48.0 PAROXYSMAL ATRIAL FIBRILLATION (H): ICD-10-CM

## 2019-04-29 LAB — INR PPP: 3.9 (ref 0.8–1.1)

## 2019-04-29 NOTE — PROGRESS NOTES
ANTICOAGULATION FOLLOW-UP CLINIC VISIT    Patient Name:  Shannan Magdaleno  Date:  4/29/2019  Contact Type:  Telephone    SUBJECTIVE:     Patient Findings     Positives:   Other complaints (Diarrhea)           OBJECTIVE    INR   Date Value Ref Range Status   04/29/2019 3.9 0.8 - 1.1 Final       ASSESSMENT / PLAN  INR assessment SUPRA    Recheck INR In: 1 WEEK    INR Location Outside lab      Anticoagulation Summary  As of 4/29/2019    INR goal:   2.0-3.0   TTR:   61.4 % (3.1 y)   INR used for dosing:   3.9! (4/29/2019)   Warfarin maintenance plan:   2.5 mg (5 mg x 0.5) every Fri; 5 mg (5 mg x 1) all other days   Full warfarin instructions:   4/30: Hold; Otherwise 2.5 mg every Fri; 5 mg all other days   Weekly warfarin total:   32.5 mg   Plan last modified:   Moon Lobato RN (1/4/2019)   Next INR check:   5/6/2019   Priority:   INR   Target end date:   Indefinite    Indications    Paroxysmal atrial fibrillation (H) [I48.0]  Long-term (current) use of anticoagulants [Z79.01] [Z79.01]             Anticoagulation Episode Summary     INR check location:       Preferred lab:       Send INR reminders to:    [facility]    Comments:         Anticoagulation Care Providers     Provider Role Specialty Phone number    Adela Morgan MD [facility] Internal Medicine [phone]            See the Encounter Report to view Anticoagulation Flowsheet and Dosing Calendar (Go to Encounters tab in chart review, and find the Anticoagulation Therapy Visit)    Dosage adjustment made based on physician directed care plan.    Nataly Tolentino RN

## 2019-05-01 ENCOUNTER — PATIENT OUTREACH (OUTPATIENT)
Dept: CARE COORDINATION | Facility: CLINIC | Age: 84
End: 2019-05-01

## 2019-05-01 DIAGNOSIS — Z76.89 HEALTH CARE HOME: Primary | ICD-10-CM

## 2019-05-01 NOTE — PROGRESS NOTES
Clinic Care Coordination Contact    Situation: Patient chart reviewed by care coordinator.    Background: Presented to [place] ED for diarrhea    Assess ment: 87 y.o. female who presents for further evaluation of diarrhea which has been present for the past 4 days going on 5 days. She has not had any associated nausea or vomiting or fevers or chills or abdominal pain. She was concerned of dehydration.. Work-up here reveals no electrolyte abnormalities. She is not anemic. No evidence of urinary tract infection. INR was mildly elevated at 3.9.  discussed this finding with the patient. She did receive a liter of IV fluids and is able to tolerate p.o. here. She was discharged home with supplies to obtain a stool sample since she was not able to provide one here to test for C. difficile and stool culture. Instructed on diarrhea brat diet and symptoms to return to the emergency department for.     Plan/Recommendations: Patient will attend follow up appointment as scheduled for 5/6/19.      Will not open to clinic care coordination at this time.     Latasha Hernandez RN, [facility] RN Coordinator  [facility]   5/1/2019    1:43 PM  [phone]

## 2019-05-02 DIAGNOSIS — F41.9 ANXIETY: ICD-10-CM

## 2019-05-02 NOTE — TELEPHONE ENCOUNTER
Controlled Substance Refill Request for diazepam (VALIUM) 2 MG tablet  Problem List Complete:  No     PROVIDER TO CONSIDER COMPLETION OF PROBLEM LIST AND OVERVIEW/CONTROLLED SUBSTANCE AGREEMENT    Last Written Prescription Date:    Last Fill Quantity: 60,   # refills: 0    THE MOST RECENT OFFICE VISIT MUST BE WITHIN THE PAST 3 MONTHS. AT LEAST ONE FACE TO FACE VISIT MUST OCCUR EVERY 6 MONTHS. ADDITIONAL VISITS CAN BE VIRTUAL.  (THIS STATEMENT SHOULD BE DELETED.)    Last Office Visit with [facility] primary care provider: 12/11/2018    Future Office visit:   Next 5 appointments (look out 90 days)    May 06, 2019  9:00 AM CDT  Office Visit with ANDREW Mcghee CNP  [facility] ([facility] [address] [place] [id number]  [phone]   Jun 03, 2019  3:30 PM CDT  PHYSICAL with Adela Morgan MD  [facility] ([facility]) [address] [place] [id number]  [phone]          Controlled substance agreement:   Encounter-Level CSA:    There are no encounter-level csa.     Patient-Level CSA:    There are no patient-level csa.         Last Urine Drug Screen: No results found for: CDAUT, No results found for: COMDAT, No results found for: THC13, PCP13, COC13, MAMP13, OPI13, AMP13, BZO13, TCA13, MTD13, BAR13, OXY13, PPX13, BUP13     Processing:  unknown     [email or web address]       checked in past 3 months?  No, route to RN

## 2019-05-03 RX ORDER — DIAZEPAM 2 MG
TABLET ORAL
Qty: 60 TABLET | Refills: 0 | Status: SHIPPED | OUTPATIENT
Start: 2019-05-03 | End: 2019-06-03

## 2019-05-03 NOTE — TELEPHONE ENCOUNTER
[place] reviewed:  reviewed- no concerns, last dispensed on 1/3/19    [place]:  [email or web address]    Tania Hall RN

## 2019-05-08 ENCOUNTER — ANTICOAGULATION THERAPY VISIT (OUTPATIENT)
Dept: NURSING | Facility: CLINIC | Age: 84
End: 2019-05-08
Payer: COMMERCIAL

## 2019-05-08 DIAGNOSIS — I48.0 PAROXYSMAL ATRIAL FIBRILLATION (H): ICD-10-CM

## 2019-05-08 LAB — INR POINT OF CARE: 3.7 (ref 0.86–1.14)

## 2019-05-08 PROCEDURE — 36416 COLLJ CAPILLARY BLOOD SPEC: CPT

## 2019-05-08 PROCEDURE — 85610 PROTHROMBIN TIME: CPT | Mod: QW

## 2019-05-08 PROCEDURE — 99207 ZZC NO CHARGE NURSE ONLY: CPT

## 2019-05-08 NOTE — PROGRESS NOTES
ANTICOAGULATION FOLLOW-UP CLINIC VISIT    Patient Name:  Shannan Magdaleno  Date:  5/8/2019  Contact Type:  Face to Face    SUBJECTIVE:     Patient Findings     Comments:   Bleeding Signs/Symptoms: NO  Thromboembolic Signs/Symptoms: NO     Medication Changes:  NO  Dietary Changes:  NO  Bacterial/Viral Infection: NO     Missed Coumadin Doses: NO  Other Concerns:  NO    Patient denies any identifiable changes that caused the supratherapeutic INR.                OBJECTIVE    INR Protime   Date Value Ref Range Status   05/08/2019 3.7 (A) 0.86 - 1.14 Final       ASSESSMENT / PLAN  No question data found.  Anticoagulation Summary  As of 5/8/2019    INR goal:   2.0-3.0   TTR:   60.9 % (3.1 y)   INR used for dosing:   3.7! (5/8/2019)   Warfarin maintenance plan:   2.5 mg (5 mg x 0.5) every Fri; 5 mg (5 mg x 1) all other days   Full warfarin instructions:   5/8: 2.5 mg; 5/13: 2.5 mg; 5/15: 2.5 mg; 5/20: 2.5 mg; Otherwise 2.5 mg every Fri; 5 mg all other days   Weekly warfarin total:   32.5 mg   Plan last modified:   Moon Lobato, JUAN (1/4/2019)   Next INR check:   5/22/2019   Priority:   INR   Target end date:   Indefinite    Indications    Paroxysmal atrial fibrillation (H) [I48.0]  Long-term (current) use of anticoagulants [Z79.01] [Z79.01]             Anticoagulation Episode Summary     INR check location:       Preferred lab:       Send INR reminders to:   [facility]    Comments:         Anticoagulation Care Providers     Provider Role Specialty Phone number    Adela Morgan MD [facility] Internal Medicine [phone]            See the Encounter Report to view Anticoagulation Flowsheet and Dosing Calendar (Go to Encounters tab in chart review, and find the Anticoagulation Therapy Visit)        Moon Lobato RN

## 2019-05-08 NOTE — PATIENT INSTRUCTIONS
[facility]:  Please call [phone] to cancel and/or reschedule your appointment   Please call [phone] with any problems or questions regarding your Warfarin therapy.

## 2019-05-13 NOTE — PROGRESS NOTES
SUBJECTIVE:   Shannan Magdaleno is a 87 year old female who presents for Preventive Visit.  Are you in the first 12 months of your Medicare coverage?  No    Valium is making her too sleepy.  She takes just a half and it helps her through the night.    She feels down when she can't do what she would like to do.   Daughter is concerned about memory.  Patient collaborates that this is so.    She doesn't drive due to concerns from her  and family.    Six Item Cognitive Impairment Test   (6CIT):      What year is it?                               Correct - 0 points    What month is it?                               Correct - 0 points      Give the patient an address to remember with five components:   [place] ( first and last name - 2 components)   [address]  (number and name of street - 2 components)   [place] ( city - 1 component)      About what time is it (within the hour)? Correct - 0 points    Count backwards from 20 to 1:   More than one error - 4 points    Say the months of the year in reverse: Correct - 0 points    Repeat the address phrase:   All wrong - 10 points    Total 6CIT Score:      14/28    Interpretation: The 6CIT uses an inverse score and questions are weighted to produce a total out of 28. Scores of 0-7 are considered normal and 8 or more significant.    Advantages The test has high sensitivity without compromising specificity even in mild dementia. It is easy to translate linguistically and culturally.  Disadvantages The main disadvantage is in the scoring and weighting of the test, which is initially confusing, however computer models have simplified this greatly.    Probability Statistics: At the 7/8 cut off: Overall figures sensitivity 90% specificity 100%, in mild dementia sensitivity = 78% , specificity = 100%    Copyright 2000 [employer], [facility], [place]. Courtesy of Dr. Warren Bennett     History of Present Illness     Mental Health Follow-up:  Patient  "presents to follow-up on Depression.Patient's depression since last visit has been:  Bad  The patient is not having other symptoms associated with depression.      Any significant life events: No (Acceptance of Aging - not being able to drive anywhere )  Patient is not feeling anxious or having panic attacks. (Feels kind of anxious when having to get somewhere on time especially more in the morning )  Patient has no concerns about alcohol or drug use.     Social History  Tobacco Use    Smoking status: Never Smoker    Smokeless tobacco: Never Used  Alcohol use: No    Alcohol/week: 0.0 oz  Drug use: No      Today's PHQ-9         PHQ-9 Total Score:     (P) 8   PHQ-9 Q9 Thoughts of better off dead/self-harm past 2 weeks :   (P) Not at all   Thoughts of suicide or self harm:      Self-harm Plan:        Self-harm Action:          Safety concerns for self or others:           Hyperlipidemia:  She presents for follow up of hyperlipidemia.  She reports shortness of breath (When walking occasionally ). She is taking medication to lower cholesterol. She is not having myalgia or other side effects to statin medications.    Hypertension: She presents for follow up of hypertension.  She does check blood pressure  regularly outside of the clinic. Outpatient blood pressures have not been over 140/90. She does not follow a low salt diet.     She eats 2-3 servings of fruits and vegetables daily.She consumes 0 sweetened beverage(s) daily.  [unfilled] is taking medications regularly.    Healthy Habits:     In general, how would you rate your overall health?  Fair    Frequency of exercise:  None    Do you usually eat at least 4 servings of fruit and vegetables a day, include whole grains    & fiber and avoid regularly eating high fat or \"junk\" foods?  No    Taking medications regularly:  0    Medication side effects:  No muscle aches, No significant flushing and Other    Ability to successfully perform activities of daily living:  " Transportation requires assistance, shopping requires assistance, housework requires assistance, medication administration requires assistance and money management requires assistance    Home Safety:  No safety concerns identified    Hearing Impairment:  Difficulty following a conversation in a noisy restaurant or crowded room, feel that people are mumbling or not speaking clearly, need to ask people to speak up or repeat themselves and difficulty understanding soft or whispered speech    In the past 6 months, have you been bothered by leaking of urine? Yes    In general, how would you rate your overall mental or emotional health?  Fair      PHQ-2 Total Score: 4    Additional concerns today:  No    Do you feel safe in your environment? Yes    Do you have a Health Care Directive? Yes: Advance Directive has been received and scanned.      Fall risk  Fallen 2 or more times in the past year?: No  Any fall with injury in the past year?: No    Cognitive Screening   1) Repeat 3 items (Leader, Season, Table)    2) Clock draw: ABNORMAL   3) 3 item recall: Recalls NO objects   Results: 0 items recalled: PROBABLE COGNITIVE IMPAIRMENT, **INFORM PROVIDER**    Mini-CogTM Copyright MAYUR Short. Licensed by the author for use in [facility]; reprinted with permission (barrie@[facility]). All rights reserved.      Do you have sleep apnea, excessive snoring or daytime drowsiness?: yes daytime drowsiness     Reviewed and updated as needed this visit by clinical staff  Tobacco  Allergies  Meds  Problems  Med Hx  Surg Hx  Fam Hx  Soc Hx          Reviewed and updated as needed this visit by Provider  Tobacco  Allergies  Meds  Problems  Med Hx  Surg Hx  Fam Hx        Social History     Tobacco Use     Smoking status: Never Smoker     Smokeless tobacco: Never Used   Substance Use Topics     Alcohol use: No     Alcohol/week: 0.0 oz     If you drink alcohol do you typically have >3 drinks per day or >7 drinks per week?  No    Alcohol Use 6/3/2019   Prescreen: >3 drinks/day or >7 drinks/week? Not Applicable   Prescreen: >3 drinks/day or >7 drinks/week? -   No flowsheet data found.            Current providers sharing in care for this patient include:   Patient Care Team:  Adela Morgan MD as PCP - General  [facility]  Sotero Potts MD as Assigned PCP    The following health maintenance items are reviewed in Epic and correct as of today:  Health Maintenance   Topic Date Due     OP ANNUAL INR REFERRAL  05/18/2017     PHQ-9  04/29/2019     EYE EXAM  05/28/2020     MEDICARE ANNUAL WELLNESS VISIT  06/03/2020     DEXA  07/31/2020     LIPID  09/09/2021     DTAP/TDAP/TD IMMUNIZATION (4 - Td) 08/24/2022     ADVANCED DIRECTIVE PLANNING  01/18/2024     DEPRESSION ACTION PLAN  Completed     INFLUENZA VACCINE  Completed     IPV IMMUNIZATION  Aged Out     MENINGITIS IMMUNIZATION  Aged Out     Lab work is in process  Labs reviewed in EPIC      Review of Systems   Constitutional: Negative for chills and fever.   HENT: Positive for hearing loss. Negative for congestion, ear pain and sore throat.    Eyes: Negative for pain and visual disturbance.   Respiratory: Negative for cough and shortness of breath.    Cardiovascular: Positive for peripheral edema. Negative for chest pain and palpitations.   Gastrointestinal: Positive for constipation and diarrhea. Negative for abdominal pain, heartburn, hematochezia and nausea.   Breasts:  Negative for tenderness, breast mass and discharge.   Genitourinary: Positive for urgency. Negative for dysuria, frequency, genital sores, hematuria, pelvic pain, vaginal bleeding and vaginal discharge.   Musculoskeletal: Negative for arthralgias, joint swelling and myalgias.   Skin: Negative for rash.   Neurological: Positive for dizziness and weakness. Negative for headaches and paresthesias.   Psychiatric/Behavioral: Positive for mood changes. The patient is not nervous/anxious.       ROS: 10  "point ROS neg other than the symptoms noted above in the HPI.     OBJECTIVE:   /58   Pulse 67   Temp 97.6  F (36.4  C) (Oral)   Resp 14   Ht 1.575 m (5' 2.01\")   Wt 65.2 kg (143 lb 12.8 oz)   SpO2 95%   BMI 26.29 kg/m   Estimated body mass index is 26.29 kg/m  as calculated from the following:    Height as of this encounter: 1.575 m (5' 2.01\").    Weight as of this encounter: 65.2 kg (143 lb 12.8 oz).  Physical Exam   Constitutional: She is oriented to person, place, and time. She appears well-developed and well-nourished. No distress.   HENT:   Right Ear: Tympanic membrane and external ear normal.   Left Ear: Tympanic membrane and external ear normal.   Nose: Nose normal.   Mouth/Throat: Oropharynx is clear and moist. No oropharyngeal exudate.   Eyes: Pupils are equal, round, and reactive to light. Conjunctivae are normal. Right eye exhibits no discharge. Left eye exhibits no discharge.   Neck: Neck supple. No tracheal deviation present. No thyromegaly present.   Cardiovascular: Normal rate, S1 normal, S2 normal, normal heart sounds and normal pulses. An irregular rhythm present. Exam reveals no S3, no S4 and no friction rub.   No murmur heard.  Pulmonary/Chest: Effort normal. No respiratory distress. She has no wheezes. She has rhonchi in the right upper field, the right middle field and the right lower field. She has no rales. Right breast exhibits no mass, no nipple discharge and no tenderness. Left breast exhibits no mass, no nipple discharge and no tenderness.   Abdominal: Soft. Bowel sounds are normal. She exhibits no mass. There is no hepatosplenomegaly. There is no tenderness.   Genitourinary:   Genitourinary Comments: External hemorrhoids noted   Musculoskeletal: Normal range of motion. She exhibits no edema.   Lymphadenopathy:     She has no cervical adenopathy.   Neurological: She is alert and oriented to person, place, and time. She has normal strength and normal reflexes. She exhibits " normal muscle tone.   Skin: Skin is warm and dry. No rash noted.   Psychiatric: She has a normal mood and affect. Judgment and thought content normal. Cognition and memory are impaired.         Diagnostic Test Results:  Results for orders placed or performed in visit on 06/03/19   XR Chest 2 Views    Narrative    XR CHEST 2 VW 6/3/2019 5:32 PM    HISTORY: Previous right basilar pneumonia. Abnormal lung sounds.    COMPARISON: 12/19/2018    FINDINGS: No airspace consolidation, pleural effusion or pneumothorax.  Normal heart size.      Impression    IMPRESSION: No evidence of pneumonia.    PAT CASTANON MD       ASSESSMENT / PLAN:   1. Routine general medical examination at a health care facility      2. Anxiety  Patient denies anxiety   - escitalopram (LEXAPRO) 10 MG tablet; TAKE 1&1/2 TABLETS (15 MG) BY MOUTH DAILY  Dispense: 135 tablet; Refill: 1    3. Paroxysmal atrial fibrillation (H)  Well controlled with medications without side effects.   - warfarin (COUMADIN) 5 MG tablet; Take 2.5 mg on Friday and 5 mg on all other days  Dispense: 90 tablet; Refill: 3    4. Lymphoma, small lymphocytic (H)  Per oncology     5. Recurrent major depressive disorder, in partial remission (H)  Patient feels she has good control    6. Abnormal lung sounds  History of pneumonia and needs follow up chest xray   - XR Chest 2 Views    7. History of pneumonia    - XR Chest 2 Views    8. External hemorrhoids    - hydrocortisone (ANUSOL-HC) 2.5 % cream; Place rectally 2 times daily as needed for hemorrhoids  Dispense: 30 g; Refill: 1    9. Driving safety issue  Letter to [employer] written given performance on memory eval.  Daughter supports this as well     10. Memory impairment  Per patient instructions.   - MR Brain w/o Contrast; Future  - NEUROPSYCHOLOGY REFERRAL    End of Life Planning:  Patient currently has an advanced directive: Yes.  Practitioner is supportive of decision.    COUNSELING:  Reviewed preventive health counseling, as  "reflected in patient instructions       Regular exercise       Healthy diet/nutrition    Estimated body mass index is 26.29 kg/m  as calculated from the following:    Height as of this encounter: 1.575 m (5' 2.01\").    Weight as of this encounter: 65.2 kg (143 lb 12.8 oz).         reports that she has never smoked. She has never used smokeless tobacco.      Appropriate preventive services were discussed with this patient, including applicable screening as appropriate for cardiovascular disease, diabetes, osteopenia/osteoporosis, and glaucoma.  As appropriate for age/gender, discussed screening for colorectal cancer, prostate cancer, breast cancer, and cervical cancer. Checklist reviewing preventive services available has been given to the patient.    Reviewed patients plan of care and provided an AVS. The Basic Care Plan (routine screening as documented in Health Maintenance) for Shannan meets the Care Plan requirement. This Care Plan has been established and reviewed with the Patient.    Counseling Resources:  ATP IV Guidelines  Pooled Cohorts Equation Calculator  Breast Cancer Risk Calculator  FRAX Risk Assessment  ICSI Preventive Guidelines  Dietary Guidelines for Americans, 2010  [employer]'s MyPlate  ASA Prophylaxis  Lung CA Screening    Adela Morgan MD  [facility]  Patient Instructions   I will want to see you back 2 weeks after your memory test.  Schedule your memory test and brain MRI.  Stop Valium.  Identified Health Risks:  Answers for HPI/ROS submitted by the patient on 6/3/2019   Annual Exam:  If you checked off any problems, how difficult have these problems made it for you to do your work, take care of things at home, or get along with other people?: Somewhat difficult  PHQ9 TOTAL SCORE: 8    "

## 2019-05-22 ENCOUNTER — ANTICOAGULATION THERAPY VISIT (OUTPATIENT)
Dept: NURSING | Facility: CLINIC | Age: 84
End: 2019-05-22
Payer: COMMERCIAL

## 2019-05-22 DIAGNOSIS — I48.0 PAROXYSMAL ATRIAL FIBRILLATION (H): ICD-10-CM

## 2019-05-22 LAB — INR POINT OF CARE: 1.9 (ref 0.86–1.14)

## 2019-05-22 PROCEDURE — 36416 COLLJ CAPILLARY BLOOD SPEC: CPT

## 2019-05-22 PROCEDURE — 85610 PROTHROMBIN TIME: CPT | Mod: QW

## 2019-05-22 PROCEDURE — 99207 ZZC NO CHARGE NURSE ONLY: CPT

## 2019-05-22 NOTE — PATIENT INSTRUCTIONS
[facility]:  Please call [phone] to cancel and/or reschedule your appointment   Please call [phone] with any problems or questions regarding your Warfarin therapy.

## 2019-05-22 NOTE — PROGRESS NOTES
ANTICOAGULATION FOLLOW-UP CLINIC VISIT    Patient Name:  Shannan Magdaleno  Date:  2019  Contact Type:  Face to Face    SUBJECTIVE:  Patient Findings     Comments:   Patient denies any identifiable changes that caused the subtherapeutic INR.           Clinical Outcomes     Negatives:   Major bleeding event, Thromboembolic event, Anticoagulation-related hospital admission, Anticoagulation-related ED visit, Anticoagulation-related fatality    Comments:   Patient denies any identifiable changes that caused the subtherapeutic INR.              OBJECTIVE    INR Protime   Date Value Ref Range Status   2019 1.9 (A) 0.86 - 1.14 Final       ASSESSMENT / PLAN  No question data found.  Anticoagulation Summary  As of 2019    INR goal:   2.0-3.0   TTR:   60.9 % (3.2 y)   INR used for dosin.9! (2019)   Warfarin maintenance plan:   2.5 mg (5 mg x 0.5) every Mon, Fri; 5 mg (5 mg x 1) all other days   Full warfarin instructions:   2.5 mg every Mon, Fri; 5 mg all other days   Weekly warfarin total:   30 mg   Plan last modified:   Moon Lobato RN (2019)   Next INR check:   2019   Priority:   INR   Target end date:   Indefinite    Indications    Paroxysmal atrial fibrillation (H) [I48.0]  Long-term (current) use of anticoagulants [Z79.01] [Z79.01]             Anticoagulation Episode Summary     INR check location:       Preferred lab:       Send INR reminders to:   [facility]    Comments:         Anticoagulation Care Providers     Provider Role Specialty Phone number    Adela Morgan MD [facility] Internal Medicine [phone]            See the Encounter Report to view Anticoagulation Flowsheet and Dosing Calendar (Go to Encounters tab in chart review, and find the Anticoagulation Therapy Visit)        Moon Lobato RN

## 2019-05-28 ENCOUNTER — OFFICE VISIT (OUTPATIENT)
Dept: OPHTHALMOLOGY | Facility: CLINIC | Age: 84
End: 2019-05-28
Payer: COMMERCIAL

## 2019-05-28 DIAGNOSIS — Z01.01 ENCOUNTER FOR EXAMINATION OF EYES AND VISION WITH ABNORMAL FINDINGS: Primary | ICD-10-CM

## 2019-05-28 DIAGNOSIS — H25.813 COMBINED FORMS OF AGE-RELATED CATARACT OF BOTH EYES: ICD-10-CM

## 2019-05-28 DIAGNOSIS — H43.811 POSTERIOR VITREOUS DETACHMENT, RIGHT: ICD-10-CM

## 2019-05-28 DIAGNOSIS — H52.4 PRESBYOPIA: ICD-10-CM

## 2019-05-28 PROCEDURE — 92014 COMPRE OPH EXAM EST PT 1/>: CPT | Performed by: OPHTHALMOLOGY

## 2019-05-28 PROCEDURE — 92015 DETERMINE REFRACTIVE STATE: CPT | Performed by: OPHTHALMOLOGY

## 2019-05-28 ASSESSMENT — REFRACTION_MANIFEST
OD_AXIS: 135
OS_SPHERE: +1.50
OS_CYLINDER: +0.50
OD_SPHERE: +0.75
OD_CYLINDER: +0.50
OS_ADD: +3.00
OD_ADD: +3.00
OS_AXIS: 163

## 2019-05-28 ASSESSMENT — REFRACTION_WEARINGRX
OD_AXIS: 135
OS_CYLINDER: +0.50
OS_SPHERE: +1.75
OS_AXIS: 158
OD_SPHERE: +2.25
OD_CYLINDER: +0.25
OD_ADD: +3.50
SPECS_TYPE: TRIFOCAL
OS_ADD: +3.25

## 2019-05-28 ASSESSMENT — VISUAL ACUITY
OD_PH_CC: 20/50
OS_CC: 2
OD_CC+: -1
OD_PH_CC+: -1
OD_CC: 2
OD_CC: 20/70
OS_CC: 20/40
METHOD: SNELLEN - LINEAR
CORRECTION_TYPE: GLASSES

## 2019-05-28 ASSESSMENT — CUP TO DISC RATIO
OD_RATIO: 0.6
OS_RATIO: 0.6

## 2019-05-28 ASSESSMENT — CONF VISUAL FIELD
OS_NORMAL: 1
OD_NORMAL: 1

## 2019-05-28 ASSESSMENT — TONOMETRY
OS_IOP_MMHG: 14
OD_IOP_MMHG: 14
IOP_METHOD: APPLANATION

## 2019-05-28 ASSESSMENT — EXTERNAL EXAM - LEFT EYE: OS_EXAM: PROLAPSED FAT PADS: UPPER, LOWER

## 2019-05-28 ASSESSMENT — EXTERNAL EXAM - RIGHT EYE: OD_EXAM: PROLAPSED FAT PADS: UPPER, LOWER

## 2019-05-28 NOTE — PATIENT INSTRUCTIONS
Glasses Rx given - optional  Possible posterior vitreous detachment (sudden onset large floater and/or flashing lights) left eye discussed.  Use artificial tears up to 4 times daily both eyes.  (Refresh Tears, Systane Ultra/Balance, or Theratears)  Call in January 2020 for an appointment in May 2020 for Complete Exam    Dr. Stewart [phone]

## 2019-05-28 NOTE — PROGRESS NOTES
Current Eye Medications:  None.     Subjective:  Comprehensive Eye Exam.  Patient complains of decreasing vision, especially when looking in the distance.  Her vision in her left eye appears to be clearer than her right eye.       Objective:  See Ophthalmology Exam.       Assessment:  Mild worsening of cataract right eye.      ICD-10-CM    1. Encounter for examination of eyes and vision with abnormal findings Z01.01 REFRACTIVE STATUS   2. Presbyopia H52.4 REFRACTIVE STATUS   3. Combined forms of age-related cataract, mild-mod, both eyes H25.813 EYE EXAM (SIMPLE-NONBILLABLE)   4. Posterior vitreous detachment, right H43.811         Plan:  Glasses Rx given - optional  Possible posterior vitreous detachment (sudden onset large floater and/or flashing lights) left eye discussed.  Use artificial tears up to 4 times daily both eyes.  (Refresh Tears, Systane Ultra/Balance, or Theratears)  Call in January 2020 for an appointment in May 2020 for Complete Exam    Dr. Stewart [phone]

## 2019-05-28 NOTE — LETTER
5/28/2019         RE: Shannan Magdaleno  [address]  [place] [address]        Dear Colleague,    Thank you for referring your patient, Shannan Magdaleno, to the [facility]. Please see a copy of my visit note below.     Current Eye Medications:  None.     Subjective:  Comprehensive Eye Exam.  Patient complains of decreasing vision, especially when looking in the distance.  Her vision in her left eye appears to be clearer than her right eye.       Objective:  See Ophthalmology Exam.       Assessment:  Mild worsening of cataract right eye.      ICD-10-CM    1. Encounter for examination of eyes and vision with abnormal findings Z01.01 REFRACTIVE STATUS   2. Presbyopia H52.4 REFRACTIVE STATUS   3. Combined forms of age-related cataract, mild-mod, both eyes H25.813 EYE EXAM (SIMPLE-NONBILLABLE)   4. Posterior vitreous detachment, right H43.811         Plan:  Glasses Rx given - optional  Possible posterior vitreous detachment (sudden onset large floater and/or flashing lights) left eye discussed.  Use artificial tears up to 4 times daily both eyes.  (Refresh Tears, Systane Ultra/Balance, or Theratears)  Call in January 2020 for an appointment in May 2020 for Complete Exam    Dr. Stewart [phone]         Again, thank you for allowing me to participate in the care of your patient.        Sincerely,        Jamari Stewart MD

## 2019-06-03 ENCOUNTER — OFFICE VISIT (OUTPATIENT)
Dept: INTERNAL MEDICINE | Facility: CLINIC | Age: 84
End: 2019-06-03
Payer: COMMERCIAL

## 2019-06-03 ENCOUNTER — ANCILLARY PROCEDURE (OUTPATIENT)
Dept: GENERAL RADIOLOGY | Facility: CLINIC | Age: 84
End: 2019-06-03
Attending: INTERNAL MEDICINE
Payer: COMMERCIAL

## 2019-06-03 VITALS
DIASTOLIC BLOOD PRESSURE: 58 MMHG | BODY MASS INDEX: 26.46 KG/M2 | OXYGEN SATURATION: 95 % | WEIGHT: 143.8 LBS | HEART RATE: 67 BPM | SYSTOLIC BLOOD PRESSURE: 116 MMHG | TEMPERATURE: 97.6 F | RESPIRATION RATE: 14 BRPM | HEIGHT: 62 IN

## 2019-06-03 DIAGNOSIS — I48.0 PAROXYSMAL ATRIAL FIBRILLATION (H): ICD-10-CM

## 2019-06-03 DIAGNOSIS — F41.9 ANXIETY: ICD-10-CM

## 2019-06-03 DIAGNOSIS — Z91.89 DRIVING SAFETY ISSUE: ICD-10-CM

## 2019-06-03 DIAGNOSIS — Z87.01 HISTORY OF PNEUMONIA: ICD-10-CM

## 2019-06-03 DIAGNOSIS — R09.89 ABNORMAL LUNG SOUNDS: ICD-10-CM

## 2019-06-03 DIAGNOSIS — K64.4 EXTERNAL HEMORRHOIDS: ICD-10-CM

## 2019-06-03 DIAGNOSIS — R41.3 MEMORY IMPAIRMENT: ICD-10-CM

## 2019-06-03 DIAGNOSIS — C83.00 LYMPHOMA, SMALL LYMPHOCYTIC (H): ICD-10-CM

## 2019-06-03 DIAGNOSIS — Z00.00 ROUTINE GENERAL MEDICAL EXAMINATION AT A HEALTH CARE FACILITY: Primary | ICD-10-CM

## 2019-06-03 DIAGNOSIS — F33.41 RECURRENT MAJOR DEPRESSIVE DISORDER, IN PARTIAL REMISSION (H): ICD-10-CM

## 2019-06-03 PROCEDURE — G0438 PPPS, INITIAL VISIT: HCPCS | Performed by: INTERNAL MEDICINE

## 2019-06-03 PROCEDURE — 71046 X-RAY EXAM CHEST 2 VIEWS: CPT

## 2019-06-03 PROCEDURE — 99214 OFFICE O/P EST MOD 30 MIN: CPT | Mod: 25 | Performed by: INTERNAL MEDICINE

## 2019-06-03 RX ORDER — WARFARIN SODIUM 5 MG/1
TABLET ORAL
Qty: 90 TABLET | Refills: 3 | Status: SHIPPED | OUTPATIENT
Start: 2019-06-03 | End: 2020-07-01

## 2019-06-03 RX ORDER — ESCITALOPRAM OXALATE 10 MG/1
TABLET ORAL
Qty: 135 TABLET | Refills: 1 | Status: SHIPPED | OUTPATIENT
Start: 2019-06-03 | End: 2020-01-03

## 2019-06-03 ASSESSMENT — PAIN SCALES - GENERAL: PAINLEVEL: NO PAIN (0)

## 2019-06-03 ASSESSMENT — ENCOUNTER SYMPTOMS
NAUSEA: 0
HEADACHES: 0
FEVER: 0
SORE THROAT: 0
EYE PAIN: 0
PALPITATIONS: 0
ARTHRALGIAS: 0
NERVOUS/ANXIOUS: 0
BREAST MASS: 0
PARESTHESIAS: 0
DIARRHEA: 1
CHILLS: 0
DIZZINESS: 1
HEMATURIA: 0
JOINT SWELLING: 0
ABDOMINAL PAIN: 0
WEAKNESS: 1
COUGH: 0
FREQUENCY: 0
MYALGIAS: 0
CONSTIPATION: 1
HEMATOCHEZIA: 0
SHORTNESS OF BREATH: 0
HEARTBURN: 0
DYSURIA: 0

## 2019-06-03 ASSESSMENT — PATIENT HEALTH QUESTIONNAIRE - PHQ9
SUM OF ALL RESPONSES TO PHQ QUESTIONS 1-9: 8
10. IF YOU CHECKED OFF ANY PROBLEMS, HOW DIFFICULT HAVE THESE PROBLEMS MADE IT FOR YOU TO DO YOUR WORK, TAKE CARE OF THINGS AT HOME, OR GET ALONG WITH OTHER PEOPLE: SOMEWHAT DIFFICULT
SUM OF ALL RESPONSES TO PHQ QUESTIONS 1-9: 8

## 2019-06-03 ASSESSMENT — ACTIVITIES OF DAILY LIVING (ADL)
CURRENT_FUNCTION: TRANSPORTATION REQUIRES ASSISTANCE
CURRENT_FUNCTION: HOUSEWORK REQUIRES ASSISTANCE
CURRENT_FUNCTION: MEDICATION ADMINISTRATION REQUIRES ASSISTANCE
CURRENT_FUNCTION: SHOPPING REQUIRES ASSISTANCE
CURRENT_FUNCTION: MONEY MANAGEMENT REQUIRES ASSISTANCE

## 2019-06-03 ASSESSMENT — MIFFLIN-ST. JEOR: SCORE: 1040.65

## 2019-06-03 NOTE — LETTER
[address] [address]  Phone: [phone]    June 4, 2019       & Vehicle Services   Evaluation  [address] [place] [address] [address]  [place] [address]        Re:  Shannan Magdaleno  YOB: 1931  [address] [place] [address]    To Whom it May Concern:    I have been asked to furnish you with a statement as to Shannan Magdaleno's physical and mental condition in regards to her ability to safely operate a motor vehicle.     It is my opinion that unless she can pass both a written exam and driving exam Shannan should not be allowed to operate a motor vehicle.    Sincerely,        Adela Morgan MD/mariza

## 2019-06-03 NOTE — PATIENT INSTRUCTIONS
I will want to see you back 2 weeks after your memory test.  Schedule your memory test and brain MRI.  Stop Valium.

## 2019-06-04 ASSESSMENT — PATIENT HEALTH QUESTIONNAIRE - PHQ9: SUM OF ALL RESPONSES TO PHQ QUESTIONS 1-9: 8

## 2019-06-04 NOTE — RESULT ENCOUNTER NOTE
Dear Shannan,    Your recent test results are attached.      Normal chest x-ray.    If you have any questions please feel free to contact [phone] or myself via [employer].    Sincerely,  Dorothy Yin, CNP

## 2019-06-12 ENCOUNTER — ANCILLARY PROCEDURE (OUTPATIENT)
Dept: MRI IMAGING | Facility: CLINIC | Age: 84
End: 2019-06-12
Attending: INTERNAL MEDICINE
Payer: COMMERCIAL

## 2019-06-12 ENCOUNTER — ANTICOAGULATION THERAPY VISIT (OUTPATIENT)
Dept: NURSING | Facility: CLINIC | Age: 84
End: 2019-06-12
Payer: COMMERCIAL

## 2019-06-12 DIAGNOSIS — I48.0 PAROXYSMAL ATRIAL FIBRILLATION (H): ICD-10-CM

## 2019-06-12 DIAGNOSIS — R41.3 MEMORY IMPAIRMENT: ICD-10-CM

## 2019-06-12 LAB
CREAT BLD-MCNC: 0.8 MG/DL (ref 0.5–1.2)
GFR SERPL CREATININE-BSD FRML MDRD: 66 ML/MIN/{1.73_M2}
GFRB: 68
INR POINT OF CARE: 2.4 (ref 0.86–1.14)

## 2019-06-12 PROCEDURE — A9585 GADOBUTROL INJECTION: HCPCS | Mod: JW

## 2019-06-12 PROCEDURE — 85610 PROTHROMBIN TIME: CPT | Mod: QW

## 2019-06-12 PROCEDURE — 36416 COLLJ CAPILLARY BLOOD SPEC: CPT

## 2019-06-12 PROCEDURE — 99207 ZZC NO CHARGE NURSE ONLY: CPT

## 2019-06-12 PROCEDURE — 70553 MRI BRAIN STEM W/O & W/DYE: CPT | Mod: TC

## 2019-06-12 PROCEDURE — A9585 GADOBUTROL INJECTION: HCPCS

## 2019-06-12 RX ORDER — GADOBUTROL 604.72 MG/ML
7.5 INJECTION INTRAVENOUS ONCE
Status: COMPLETED | OUTPATIENT
Start: 2019-06-12 | End: 2019-06-12

## 2019-06-12 RX ADMIN — GADOBUTROL 6.5 ML: 604.72 INJECTION INTRAVENOUS at 13:55

## 2019-06-12 NOTE — RESULT ENCOUNTER NOTE
There is evidence of hardening of the arteries of the brain which is common with aging.  No evidence of stroke or brain tumor.  Great news!

## 2019-06-12 NOTE — PROGRESS NOTES
ANTICOAGULATION FOLLOW-UP CLINIC VISIT    Patient Name:  Shannan Magdaleno  Date:  2019  Contact Type:  Face to Face    SUBJECTIVE:  Patient Findings         Clinical Outcomes     Negatives:   Major bleeding event, Thromboembolic event, Anticoagulation-related hospital admission, Anticoagulation-related ED visit, Anticoagulation-related fatality           OBJECTIVE    INR Protime   Date Value Ref Range Status   2019 2.4 (A) 0.86 - 1.14 Final       ASSESSMENT / PLAN  No question data found.  Anticoagulation Summary  As of 2019    INR goal:   2.0-3.0   TTR:   61.2 % (3.2 y)   INR used for dosin.4 (2019)   Warfarin maintenance plan:   2.5 mg (5 mg x 0.5) every Mon, Fri; 5 mg (5 mg x 1) all other days   Full warfarin instructions:   2.5 mg every Mon, Fri; 5 mg all other days   Weekly warfarin total:   30 mg   No change documented:   Moon Lobato RN   Plan last modified:   Moon Lobato RN (2019)   Next INR check:   7/10/2019   Priority:   INR   Target end date:   Indefinite    Indications    Paroxysmal atrial fibrillation (H) [I48.0]  Long-term (current) use of anticoagulants [Z79.01] [Z79.01]             Anticoagulation Episode Summary     INR check location:       Preferred lab:       Send INR reminders to:   [facility]    Comments:         Anticoagulation Care Providers     Provider Role Specialty Phone number    Adela Morgan MD [facility] [phone]            See the Encounter Report to view Anticoagulation Flowsheet and Dosing Calendar (Go to Encounters tab in chart review, and find the Anticoagulation Therapy Visit)        Moon Lobato RN

## 2019-07-10 ENCOUNTER — ANTICOAGULATION THERAPY VISIT (OUTPATIENT)
Dept: NURSING | Facility: CLINIC | Age: 84
End: 2019-07-10
Payer: COMMERCIAL

## 2019-07-10 DIAGNOSIS — Z79.01 LONG TERM CURRENT USE OF ANTICOAGULANT THERAPY: ICD-10-CM

## 2019-07-10 DIAGNOSIS — I48.0 PAROXYSMAL ATRIAL FIBRILLATION (H): ICD-10-CM

## 2019-07-10 LAB — INR POINT OF CARE: 2 (ref 0.86–1.14)

## 2019-07-10 PROCEDURE — 36416 COLLJ CAPILLARY BLOOD SPEC: CPT

## 2019-07-10 PROCEDURE — 85610 PROTHROMBIN TIME: CPT | Mod: QW

## 2019-07-10 PROCEDURE — 99207 ZZC NO CHARGE NURSE ONLY: CPT

## 2019-07-10 NOTE — PROGRESS NOTES
ANTICOAGULATION FOLLOW-UP CLINIC VISIT    Patient Name:  Shannan Magdaleno  Date:  7/10/2019  Contact Type:  Face to Face    SUBJECTIVE:  Patient Findings         Clinical Outcomes     Negatives:   Major bleeding event, Thromboembolic event, Anticoagulation-related hospital admission, Anticoagulation-related ED visit, Anticoagulation-related fatality           OBJECTIVE    INR Protime   Date Value Ref Range Status   07/10/2019 2.0 (A) 0.86 - 1.14 Final       ASSESSMENT / PLAN  No question data found.  Anticoagulation Summary  As of 7/10/2019    INR goal:   2.0-3.0   TTR:   62.1 % (3.3 y)   INR used for dosin.0 (7/10/2019)   Warfarin maintenance plan:   2.5 mg (5 mg x 0.5) every Mon, Fri; 5 mg (5 mg x 1) all other days   Full warfarin instructions:   2.5 mg every Mon, Fri; 5 mg all other days   Weekly warfarin total:   30 mg   No change documented:   Moon Lobato RN   Plan last modified:   Moon Lobato RN (2019)   Next INR check:   2019   Priority:   INR   Target end date:   Indefinite    Indications    Paroxysmal atrial fibrillation (H) [I48.0]  Long-term (current) use of anticoagulants [Z79.01] [Z79.01]             Anticoagulation Episode Summary     INR check location:       Preferred lab:       Send INR reminders to:   ARNOLDO FLEMING    Comments:         Anticoagulation Care Providers     Provider Role Specialty Phone number    Adela Morgan MD [facility] Internal Medicine [phone]            See the Encounter Report to view Anticoagulation Flowsheet and Dosing Calendar (Go to Encounters tab in chart review, and find the Anticoagulation Therapy Visit)        Moon Lobato RN

## 2019-08-07 ENCOUNTER — ANTICOAGULATION THERAPY VISIT (OUTPATIENT)
Dept: NURSING | Facility: CLINIC | Age: 84
End: 2019-08-07
Payer: COMMERCIAL

## 2019-08-07 ENCOUNTER — TELEPHONE (OUTPATIENT)
Dept: FAMILY MEDICINE | Facility: CLINIC | Age: 84
End: 2019-08-07

## 2019-08-07 DIAGNOSIS — Z79.01 LONG TERM CURRENT USE OF ANTICOAGULANT THERAPY: ICD-10-CM

## 2019-08-07 DIAGNOSIS — I48.0 PAROXYSMAL ATRIAL FIBRILLATION (H): ICD-10-CM

## 2019-08-07 DIAGNOSIS — I48.0 PAROXYSMAL ATRIAL FIBRILLATION (H): Primary | ICD-10-CM

## 2019-08-07 LAB — INR POINT OF CARE: 1.9 (ref 0.86–1.14)

## 2019-08-07 PROCEDURE — 85610 PROTHROMBIN TIME: CPT | Mod: QW

## 2019-08-07 PROCEDURE — 99207 ZZC NO CHARGE NURSE ONLY: CPT

## 2019-08-07 PROCEDURE — 36416 COLLJ CAPILLARY BLOOD SPEC: CPT

## 2019-08-07 NOTE — PROGRESS NOTES
ANTICOAGULATION FOLLOW-UP CLINIC VISIT    Patient Name:  Shannan Magdaleno  Date:  2019  Contact Type:  Face to Face    SUBJECTIVE:  Patient Findings     Comments:    has been in and out of the hospital. Has been eating a bit differently.        Clinical Outcomes     Negatives:   Major bleeding event, Thromboembolic event, Anticoagulation-related hospital admission, Anticoagulation-related ED visit, Anticoagulation-related fatality    Comments:    has been in and out of the hospital. Has been eating a bit differently.           OBJECTIVE    INR Protime   Date Value Ref Range Status   2019 1.9 (A) 0.86 - 1.14 Final       ASSESSMENT / PLAN  No question data found.  Anticoagulation Summary  As of 2019    INR goal:   2.0-3.0   TTR:   60.7 % (3.4 y)   INR used for dosin.9! (2019)   Warfarin maintenance plan:   2.5 mg (5 mg x 0.5) every Mon, Fri; 5 mg (5 mg x 1) all other days   Full warfarin instructions:   2.5 mg every Mon, Fri; 5 mg all other days   Weekly warfarin total:   30 mg   No change documented:   Moon Lobato RN   Plan last modified:   Moon Lobato RN (2019)   Next INR check:   2019   Priority:   INR   Target end date:   Indefinite    Indications    Paroxysmal atrial fibrillation (H) [I48.0]  Long-term (current) use of anticoagulants [Z79.01] [Z79.01]             Anticoagulation Episode Summary     INR check location:       Preferred lab:       Send INR reminders to:   ARNOLDO FLEMING    Comments:         Anticoagulation Care Providers     Provider Role Specialty Phone number    Adela Morgan MD [facility] Internal Medicine [phone]            See the Encounter Report to view Anticoagulation Flowsheet and Dosing Calendar (Go to Encounters tab in chart review, and find the Anticoagulation Therapy Visit)        Moon Lobato RN

## 2019-08-07 NOTE — PATIENT INSTRUCTIONS
[facility]:  Please call [phone] to cancel and/or reschedule your appointment   Please call [phone] with any problems or questions regarding your Warfarin therapy.

## 2019-08-21 ENCOUNTER — ANTICOAGULATION THERAPY VISIT (OUTPATIENT)
Dept: NURSING | Facility: CLINIC | Age: 84
End: 2019-08-21
Payer: COMMERCIAL

## 2019-08-21 DIAGNOSIS — Z79.01 LONG TERM CURRENT USE OF ANTICOAGULANT THERAPY: ICD-10-CM

## 2019-08-21 DIAGNOSIS — I48.0 PAROXYSMAL ATRIAL FIBRILLATION (H): ICD-10-CM

## 2019-08-21 LAB — INR POINT OF CARE: 1.7 (ref 0.86–1.14)

## 2019-08-21 PROCEDURE — 85610 PROTHROMBIN TIME: CPT | Mod: QW

## 2019-08-21 PROCEDURE — 99207 ZZC NO CHARGE NURSE ONLY: CPT

## 2019-08-21 PROCEDURE — 36416 COLLJ CAPILLARY BLOOD SPEC: CPT

## 2019-08-21 NOTE — PROGRESS NOTES
ANTICOAGULATION FOLLOW-UP CLINIC VISIT    Patient Name:  Shannan Magdaleno  Date:  2019  Contact Type:  Face to Face    SUBJECTIVE:  Patient Findings     Comments:   Patient denies any identifiable changes that caused the subtherapeutic INR. Maintenance dose increased by 8% today          Clinical Outcomes     Negatives:   Major bleeding event, Thromboembolic event, Anticoagulation-related hospital admission, Anticoagulation-related ED visit, Anticoagulation-related fatality    Comments:   Patient denies any identifiable changes that caused the subtherapeutic INR. Maintenance dose increased by 8% today             OBJECTIVE    INR Protime   Date Value Ref Range Status   2019 1.7 (A) 0.86 - 1.14 Final       ASSESSMENT / PLAN  INR assessment SUB    Recheck INR In: 2 WEEKS    INR Location Clinic      Anticoagulation Summary  As of 2019    INR goal:   2.0-3.0   TTR:   60.0 % (3.4 y)   INR used for dosin.7! (2019)   Warfarin maintenance plan:   2.5 mg (5 mg x 0.5) every Mon; 5 mg (5 mg x 1) all other days   Full warfarin instructions:   : 7.5 mg; Otherwise 2.5 mg every Mon; 5 mg all other days   Weekly warfarin total:   32.5 mg   Plan last modified:   Moon Lobato RN (2019)   Next INR check:   2019   Priority:   INR   Target end date:   Indefinite    Indications    Paroxysmal atrial fibrillation (H) [I48.0]  Long-term (current) use of anticoagulants [Z79.01] [Z79.01]             Anticoagulation Episode Summary     INR check location:       Preferred lab:       Send INR reminders to:   ARNOLDO FLEMING    Comments:         Anticoagulation Care Providers     Provider Role Specialty Phone number    Adela Morgan MD [facility] Internal Medicine [phone]            See the Encounter Report to view Anticoagulation Flowsheet and Dosing Calendar (Go to Encounters tab in chart review, and find the Anticoagulation Therapy Visit)        Moon Lobato RN

## 2019-08-21 NOTE — PATIENT INSTRUCTIONS
Anticoagulation Clinic:  Please call [phone] with any problems or questions regarding your Warfarin therapy.    Some signs and symptoms of clots include: pain or tenderness in arm or leg, swelling in arm or leg, changes in skin color, or area is warm to touch, shortness or breath, trouble breathing.  Numbness or weakness especially on 1 side of the body, sudden trouble speaking or swallowing, sudden trouble seeing, sudden confusion, dizzy spells or headache.  If you have these please call 911 or seek medical care immediately.

## 2019-08-25 DIAGNOSIS — I48.0 PAROXYSMAL ATRIAL FIBRILLATION (H): ICD-10-CM

## 2019-08-26 ENCOUNTER — TELEPHONE (OUTPATIENT)
Dept: FAMILY MEDICINE | Facility: CLINIC | Age: 84
End: 2019-08-26

## 2019-08-26 RX ORDER — METOPROLOL TARTRATE 25 MG/1
TABLET, FILM COATED ORAL
Qty: 270 TABLET | Refills: 1 | Status: SHIPPED | OUTPATIENT
Start: 2019-08-26 | End: 2019-09-09

## 2019-08-26 NOTE — TELEPHONE ENCOUNTER
Prescription approved per [facility] Refill Protocol.  Abigail Perez RN

## 2019-08-26 NOTE — TELEPHONE ENCOUNTER
Per daughter, she is actually with patient at [facility] ED waiting to be triaged and checked out  She stated that patient has Afib and when she has increased HR, it usually will resolve after a short period  However, patient has had increased resting HR in the 100s off and on since Saturday night 8/24/19  Patient reports feeling weak and dizzy  Feels like she is going to faint every time she stands up     Tania Hall RN

## 2019-08-26 NOTE — TELEPHONE ENCOUNTER
Reason for call:  Symptom   Symptom or request: Patients Daughter calling - High pulse of 106 and high blood pressure of 140/90    Duration (how long have symptoms been present): 2days  Have you been treated for this before? Yes    Additional comments: Has been seen for this before.     Phone number to reach patient:  Other phone number:  [phone]    Best Time:  any    Can we leave a detailed message on this number?  YES

## 2019-08-28 ENCOUNTER — PATIENT OUTREACH (OUTPATIENT)
Dept: CARE COORDINATION | Facility: CLINIC | Age: 84
End: 2019-08-28

## 2019-08-28 DIAGNOSIS — Z76.89 HEALTH CARE HOME: Primary | ICD-10-CM

## 2019-08-28 NOTE — PROGRESS NOTES
Clinic Care Coordination Contact      The RN CC contacted the patient by telephone.  The patient did not want to speak very long at this time due to the fact that she had not had lunch yet.  Patient denied any chest pain shortness of breath or troubles breathing at all.  She does endorse becoming dizzy when she sits up to get out of bed as she usually did which is very quick.  The RN CC encouraged her to get up very very slowly sitting on the edge of the bed for a few minutes to ensure that she does not get dizzy and fall.  The patient endorses that she did fall yesterday although she was not hurt and she thought that a rug that had been placed differently was the cause.  At this point in time the patient would no longer speak to the RN CC and requested a call next week.      The RN CC will plan on calling the patient again next week to complete the assessment and have the patient design a goal.      Karthik Bradford MSN, RN, PHN, [facility]   Primary Care Clinical RN Care Coordinator  [facility]   [email or web address]  Office: [phone]

## 2019-09-04 ENCOUNTER — ANTICOAGULATION THERAPY VISIT (OUTPATIENT)
Dept: NURSING | Facility: CLINIC | Age: 84
End: 2019-09-04
Payer: COMMERCIAL

## 2019-09-04 DIAGNOSIS — Z79.01 LONG TERM CURRENT USE OF ANTICOAGULANT THERAPY: ICD-10-CM

## 2019-09-04 DIAGNOSIS — I48.0 PAROXYSMAL ATRIAL FIBRILLATION (H): ICD-10-CM

## 2019-09-04 LAB — INR POINT OF CARE: 2.2 (ref 0.86–1.14)

## 2019-09-04 PROCEDURE — 99207 ZZC NO CHARGE NURSE ONLY: CPT

## 2019-09-04 PROCEDURE — 36416 COLLJ CAPILLARY BLOOD SPEC: CPT

## 2019-09-04 PROCEDURE — 85610 PROTHROMBIN TIME: CPT | Mod: QW

## 2019-09-04 NOTE — PROGRESS NOTES
ANTICOAGULATION FOLLOW-UP CLINIC VISIT    Patient Name:  Shannan Magdaleno  Date:  2019  Contact Type:  Face to Face    SUBJECTIVE:  Patient Findings         Clinical Outcomes     Negatives:   Major bleeding event, Thromboembolic event, Anticoagulation-related hospital admission, Anticoagulation-related ED visit, Anticoagulation-related fatality           OBJECTIVE    INR Protime   Date Value Ref Range Status   2019 2.2 (A) 0.86 - 1.14 Final       ASSESSMENT / PLAN  No question data found.  Anticoagulation Summary  As of 2019    INR goal:   2.0-3.0   TTR:   59.8 % (3.4 y)   INR used for dosin.2 (2019)   Warfarin maintenance plan:   2.5 mg (5 mg x 0.5) every Mon; 5 mg (5 mg x 1) all other days   Full warfarin instructions:   2.5 mg every Mon; 5 mg all other days   Weekly warfarin total:   32.5 mg   No change documented:   Moon Lobato RN   Plan last modified:   Moon Lobato RN (2019)   Next INR check:   2019   Priority:   INR   Target end date:   Indefinite    Indications    Paroxysmal atrial fibrillation (H) [I48.0]  Long-term (current) use of anticoagulants [Z79.01] [Z79.01]             Anticoagulation Episode Summary     INR check location:       Preferred lab:       Send INR reminders to:   ARNOLDO FLEMING    Comments:         Anticoagulation Care Providers     Provider Role Specialty Phone number    Adela Morgan MD [facility] Internal Medicine [phone]            See the Encounter Report to view Anticoagulation Flowsheet and Dosing Calendar (Go to Encounters tab in chart review, and find the Anticoagulation Therapy Visit)        Moon Lobato RN

## 2019-09-04 NOTE — PATIENT INSTRUCTIONS
Anticoagulation Clinic:  Please call [phone] with any problems or questions regarding your Warfarin therapy.

## 2019-09-09 ENCOUNTER — OFFICE VISIT (OUTPATIENT)
Dept: INTERNAL MEDICINE | Facility: CLINIC | Age: 84
End: 2019-09-09
Payer: COMMERCIAL

## 2019-09-09 VITALS
HEART RATE: 85 BPM | RESPIRATION RATE: 17 BRPM | WEIGHT: 141 LBS | BODY MASS INDEX: 25.95 KG/M2 | SYSTOLIC BLOOD PRESSURE: 122 MMHG | OXYGEN SATURATION: 99 % | DIASTOLIC BLOOD PRESSURE: 68 MMHG | HEIGHT: 62 IN

## 2019-09-09 DIAGNOSIS — F41.9 ANXIETY: ICD-10-CM

## 2019-09-09 DIAGNOSIS — I25.10 CORONARY ARTERY DISEASE INVOLVING NATIVE CORONARY ARTERY OF NATIVE HEART WITHOUT ANGINA PECTORIS: ICD-10-CM

## 2019-09-09 DIAGNOSIS — G31.84 MILD COGNITIVE IMPAIRMENT: ICD-10-CM

## 2019-09-09 DIAGNOSIS — I48.0 PAROXYSMAL ATRIAL FIBRILLATION (H): Primary | ICD-10-CM

## 2019-09-09 DIAGNOSIS — E78.5 HYPERLIPIDEMIA LDL GOAL <70: ICD-10-CM

## 2019-09-09 DIAGNOSIS — Z23 NEED FOR PROPHYLACTIC VACCINATION AND INOCULATION AGAINST INFLUENZA: ICD-10-CM

## 2019-09-09 PROCEDURE — G0008 ADMIN INFLUENZA VIRUS VAC: HCPCS | Performed by: INTERNAL MEDICINE

## 2019-09-09 PROCEDURE — 99207 C PAF COMPLETED  NO CHARGE: CPT | Performed by: INTERNAL MEDICINE

## 2019-09-09 PROCEDURE — 99214 OFFICE O/P EST MOD 30 MIN: CPT | Mod: 25 | Performed by: INTERNAL MEDICINE

## 2019-09-09 PROCEDURE — 90662 IIV NO PRSV INCREASED AG IM: CPT | Performed by: INTERNAL MEDICINE

## 2019-09-09 RX ORDER — METOPROLOL TARTRATE 50 MG
50 TABLET ORAL
COMMUNITY
Start: 2019-08-27 | End: 2019-09-09

## 2019-09-09 RX ORDER — SIMVASTATIN 40 MG
40 TABLET ORAL DAILY
Qty: 90 TABLET | Refills: 3 | Status: SHIPPED | OUTPATIENT
Start: 2019-09-09 | End: 2020-09-01

## 2019-09-09 RX ORDER — METOPROLOL TARTRATE 50 MG
50 TABLET ORAL 2 TIMES DAILY
COMMUNITY
Start: 2019-09-09 | End: 2021-07-02

## 2019-09-09 ASSESSMENT — PAIN SCALES - GENERAL: PAINLEVEL: NO PAIN (0)

## 2019-09-09 ASSESSMENT — MIFFLIN-ST. JEOR: SCORE: 1027.95

## 2019-09-09 ASSESSMENT — PATIENT HEALTH QUESTIONNAIRE - PHQ9: SUM OF ALL RESPONSES TO PHQ QUESTIONS 1-9: 3

## 2019-09-09 NOTE — PROGRESS NOTES
Subjective     Shannan Magdaleno is a 87 year old female who presents to clinic today for the following health issues:      HPI   Atrial Fibrillation  Patient had A-fib a couple weeks ago- she doesn't have a cariology in the [facility] system. Her current cardiologist Dr. Mullins is retiring soon. She relates that she feels good today. When she gets Afib she feels the heart palpitations. She notes that she doesn't feel much pain with her Afib. She feels exhausted and wiped out when the Afib occurs during the evening. Patient's daughter notes that Dr. Rodriguez is thinking about putting her on new blood thinner so she doesn't have to come in to get INRs.    Anxiety - She notes that it doesn't happen all the time. Stress is hard on her- her  is having health issues as well. Patient's daughter notes that she feels irritated when her  is sleeping all the time and not talking to her.     Memory - She relates that remembering names is a little difficult. She isn't driving anymore- her  drives most of the time.    Patient notes good bowel movements and urination.    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)     Restless leg syndrome     Hyperlipidemia LDL goal <100     Paroxysmal atrial fibrillation (H)     Tremor     CAD (coronary artery disease)     Retinal dot hemorrhage or microaneuysm, os     Osteopenia     Insomnia     OA (osteoarthritis) of knee     Retroperitoneal lymphadenopathy     Spinal stenosis of lumbar region without neurogenic claudication     Lymphoma, small lymphocytic (H)     Long-term (current) use of anticoagulants [Z79.01]     Posterior vitreous  detachment, right     Glaucoma suspect, bilateral     Driving safety issue     Pneumonia of left lower lobe due to infectious organism (H)     Anemia, unspecified type     Combined forms of age-related cataract, mild-mod, both eyes     Past Surgical History:   Procedure Laterality Date     APPENDECTOMY       BREAST BIOPSY, RT/LT  1955    X 2 - benign     C ANESTH,REPAIR LO ABD HERNIA NOS  1995     CARDIAC CATHERIZATION  1/99 , 12/04    PTCA - Stent X 1     HC REMOVAL GALLBLADDER         Social History     Tobacco Use     Smoking status: Never Smoker     Smokeless tobacco: Never Used   Substance Use Topics     Alcohol use: No     Alcohol/week: 0.0 oz     Family History   Problem Relation Age of Onset     Respiratory Mother      Heart Disease Father      Arthritis Sister      Obesity Sister      Heart Disease Sister      Hypertension Sister      Heart Disease Son      Neurologic Disorder Son         migraines     Arthritis Sister      Heart Disease Daughter      Heart Disease Daughter      Macular Degeneration Daughter      Neurologic Disorder Daughter         migraines     Neurologic Disorder Daughter         migraines     Cancer Paternal Aunt      Macular Degeneration Daughter      Diabetes No family hx of      Cerebrovascular Disease No family hx of      Thyroid Disease No family hx of      Glaucoma No family hx of          Current Outpatient Medications   Medication Sig Dispense Refill     acetaminophen (TYLENOL) 500 MG tablet Take 1-2 tablets by mouth every 6 hours as needed.       ASPIRIN NOT PRESCRIBED (INTENTIONAL) Please choose reason not prescribed, below 0 each 0     calcium-vitamin D (CALTRATE) 600-400 MG-UNIT per tablet Take 1 tablet by mouth daily       escitalopram (LEXAPRO) 10 MG tablet TAKE 1&1/2 TABLETS (15 MG) BY MOUTH DAILY 135 tablet 1     metoprolol tartrate (LOPRESSOR) 50 MG tablet Take 1 tablet (50 mg) by mouth 2 times daily       Multiple Vitamins-Minerals (CENTRUM SILVER) per tablet Take 1  "tablet by mouth daily Has 2000 IU of Vitamin D in it. 30 tablet      nitroGLYcerin (NITROSTAT) 0.4 MG sublingual tablet Place 1 tablet (0.4 mg) under the tongue every 5 minutes as needed for chest pain 25 tablet 3     simvastatin (ZOCOR) 40 MG tablet Take 1 tablet (40 mg) by mouth daily 90 tablet 3     warfarin (COUMADIN) 5 MG tablet Take 2.5 mg on Friday and 5 mg on all other days 90 tablet 3     Allergies   Allergen Reactions     Codeine Nausea and Vomiting     Codeine      DOESN'T FEEL WELL     Latex Itching     Lisinopril      cough     Tamiflu [Oseltamivir] GI Disturbance     Diarrhea and vomiting (would try Relenza)     Atorvastatin Calcium      Myalgias       Sulfa Drugs Nausea and Vomiting     DOES'NT FEEL WELL       Reviewed and updated as needed this visit by Provider  Tobacco  Allergies  Meds  Problems  Med Hx  Surg Hx  Fam Hx         Review of Systems   ROS COMP: Constitutional, HEENT, cardiovascular, pulmonary, GI, , musculoskeletal, neuro, skin, endocrine and psych systems are negative, except as otherwise noted.    This document serves as a record of the services and decisions personally performed and made by Adela Morgan MD. It was created on her behalf by Jeannine Harris, a trained medical scribe. The creation of this document is based on the provider's statements to the medical scribe.  Jeannine Harris 12:18 PM September 9, 2019        Objective    /68 (BP Location: Left arm, Cuff Size: Adult Regular)   Pulse 85   Resp 17   Ht 1.575 m (5' 2.01\")   Wt 64 kg (141 lb)   SpO2 99%   BMI 25.78 kg/m    Body mass index is 25.78 kg/m .  Physical Exam   GENERAL: healthy, alert and no distress  RESP: lungs clear to auscultation - no rales, rhonchi or wheezes  CV: regular rate and rhythm, normal S1 S2, no S3 or S4, no murmur, click or rub, no peripheral edema and peripheral pulses strong  MS: no gross musculoskeletal defects noted, trace ankle edema bilaterally - worse on right than the left.  PSYCH: " "mentation appears normal, affect normal/bright    Diagnostic Test Results:  Labs reviewed in Epic  No results found for this or any previous visit (from the past 24 hour(s)).        Assessment & Plan     1. Hyperlipidemia LDL goal <70  Stable with current regime without complications.  Refills placed today.  Will continue to monitor with current measures.  - simvastatin (ZOCOR) 40 MG tablet; Take 1 tablet (40 mg) by mouth daily  Dispense: 90 tablet; Refill: 3    2. Anxiety  Patient relates that her anxiety stems from stress.  Encouraged patient to consider speaking with her  about grief and stress.  Will continue to monitor for further conditions.    3. Paroxysmal atrial fibrillation (H)  Patient reports not feeling much pain to her Afib but notes exhaustion and heart palpitations.  Patient notes that her cardiologist is retiring soon and would like one in the [facility]. She relates that Dr. Rodriguez wants to put her on a different blood thinner.  Referral placed today.  Will continue to monitor for further conditions.  - CARDIOLOGY EVAL ADULT REFERRAL    4. Coronary artery disease involving native coronary artery of native heart without angina pectoris  Stable with current measures without complications.    5. Mild cognitive impairment  Patient has difficulty remembering names.  Will continue to monitor at this time.  MRI negative       Encouraged patient to seek someone to talk to about grief.  Echo - normal results all muscles normal; leakage of some heart valves     BMI:   Estimated body mass index is 25.78 kg/m  as calculated from the following:    Height as of this encounter: 1.575 m (5' 2.01\").    Weight as of this encounter: 64 kg (141 lb).         Patient Instructions   Consider speaking to your  about grief and stress.      The information in this document, created by the medical scribe for me, accurately reflects the services I personally performed and the decisions made by me. I have " reviewed and approved this document for accuracy prior to leaving the patient care area.  September 9, 2019 12:26 PM    I spent 21 minutes of time with the patient and >50% of it was in education and counseling regarding health maintenance and medication recheck.  In: 12:18 PM  Out: 12:39 PM    Adela Morgan MD  [facility]

## 2019-09-11 ENCOUNTER — PATIENT OUTREACH (OUTPATIENT)
Dept: CARE COORDINATION | Facility: CLINIC | Age: 84
End: 2019-09-11

## 2019-09-11 NOTE — LETTER
Health Care Home - Access Care Plan    About Me:    Patient Name:  Shannan Magdaleno    YOB: 1931  Age:                      87 year old   [place] MRN:     [id number] Telephone Information:   Home Phone [phone]   Mobile [phone]       Address:  [address] [place] [address]  [place] [address] Email address:  [email or web address]      Emergency Contact(s)   Name Relationship Lgl Grd Work Phone Home Phone Mobile Phone   1. KENYETTA CARTY Daughter No   [phone]   2. SHANNAN FONSECA* Daughter No   [phone]   3. PONCE MAGDALENO Spouse No [phone]             Health Maintenance:      My Access Plan  Medical Emergency 911   Questions or concerns during clinic hours Primary Clinic Line, I will call the clinic directly: [facility] - [phone]   24 Hour Appointment Line [phone] or  [phone]) (toll free)   24 Hour Nurse Line [phone] (toll free)   Questions or concerns outside clinic hours 24 Hour Appointment Line, I will call the after-hours on-call line:   [facility] [phone] or [phone]) (toll-free)   Preferred Urgent Care     Preferred Hospital     [facility]     Behavioral Health Crisis Line The National Suicide Prevention Lifeline at 1-590.236.9836 or 911                     My Care Team Members  Patient Care Team       Relationship Specialty Notifications Start End    Adela Morgan MD PCP - General   3/10/09     Phone: [phone] Fax: [phone] [address] [facility] [place] [address]    [facility]    3/27/17     CM: Damian Montoya RN    Phone: [phone]         Adela Morgan MD Assigned PCP   6/16/19     Phone: [phone] Fax: [phone] [address] [facility] [place] [address]    Durose, Karthik L, RN Clinic Care Coordinator Primary Care - CC Admissions 8/28/19     Phone: [phone] Fax: [phone]                My Medical and Care Information  Problem List   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)     Restless leg syndrome     Hyperlipidemia LDL goal <100     Paroxysmal atrial fibrillation (H)     Tremor     CAD (coronary artery disease)     Retinal dot hemorrhage or microaneuysm, os     Osteopenia     Insomnia     OA (osteoarthritis) of knee     Retroperitoneal lymphadenopathy     Spinal stenosis of lumbar region without neurogenic claudication     Lymphoma, small lymphocytic (H)     Long-term (current) use of anticoagulants [Z79.01]     Posterior vitreous detachment, right     Glaucoma suspect, bilateral     Driving safety issue     Pneumonia of left lower lobe due to infectious organism (H)     Anemia, unspecified type     Combined forms of age-related cataract, mild-mod, both eyes      Current Medications and Allergies:  See printed Medication Report

## 2019-09-11 NOTE — LETTER
[facility] CARE COORDINATION  [address] [facility] [address] [place] [address]    September 11, 2019    Shannan Magdaleno  [address]  [place] [address]      Dear Shannan,    I am a clinic care coordinator who works with Adela Morgan MD at [facility]. I recently tried to call and was unable to reach you. I wanted to introduce myself and provide you with my contact information so that you can call me with questions or concerns about your health care. Below is a description of clinic care coordination and how I can further assist you.     The clinic care coordinator is a registered nurse and/or  who understand the health care system. The goal of clinic care coordination is to help you manage your health and improve access to the [facility] in the most efficient manner. The registered nurse can assist you in meeting your health care goals by providing education, coordinating services, and strengthening the communication among your providers. The  can assist you with financial, behavioral, psychosocial, chemical dependency, counseling, and/or psychiatric resources.    Please feel free to contact me at [phone], with any questions or concerns. We at [facility] are focused on providing you with the highest-quality healthcare experience possible and that all starts with you.     Sincerely,     Karthik Bradford MSN, RN, PHN, [place] [facility]   Primary Care Clinical RN Care Coordinator  [facility]   [email or web address]  Office: [phone]      Enclosed: I have enclosed a copy of a 24 Hour Access Plan. This has helpful phone numbers for you to call when needed. Please keep this in an easy to access place to use as needed.

## 2019-09-11 NOTE — PROGRESS NOTES
Clinic Care Coordination Contact  [facility]/Voicemail       Clinical Data: Care Coordinator Outreach  Outreach attempted x 1.  Left message on patient's voicemail with call back information and requested return call.  Plan: Care Coordinator will send care coordination introduction letter with care coordinator contact information and explanation of care coordination services via [employer]. Care Coordinator will try to reach patient again in 3-5 business days.          Karthik Bradford MSN, RN, PHN, [facility]   Primary Care Clinical RN Care Coordinator  [facility]   [email or web address]  Office: [phone]

## 2019-09-19 NOTE — PROGRESS NOTES
Clinic Care Coordination Contact  [facility]/Voicemail       Clinical Data: Care Coordinator Outreach  Outreach attempted x 3.  Left message on patient's voicemail with call back information and requested return call.  Plan: Care Coordinator sent care coordination introduction letter on 9/11/19 via [employer]. Care Coordinator will do no further outreaches at this time.        Karthik Bradford MSN, RN, PHN, [facility]   Primary Care Clinical RN Care Coordinator  [facility]   [email or web address]  Office: [phone]

## 2019-10-07 ENCOUNTER — ANTICOAGULATION THERAPY VISIT (OUTPATIENT)
Dept: NURSING | Facility: CLINIC | Age: 84
End: 2019-10-07
Payer: COMMERCIAL

## 2019-10-07 DIAGNOSIS — Z79.01 LONG TERM CURRENT USE OF ANTICOAGULANT THERAPY: ICD-10-CM

## 2019-10-07 DIAGNOSIS — I48.0 PAROXYSMAL ATRIAL FIBRILLATION (H): ICD-10-CM

## 2019-10-07 LAB — INR POINT OF CARE: 3.3 (ref 0.86–1.14)

## 2019-10-07 PROCEDURE — 36416 COLLJ CAPILLARY BLOOD SPEC: CPT

## 2019-10-07 PROCEDURE — 85610 PROTHROMBIN TIME: CPT | Mod: QW

## 2019-10-07 PROCEDURE — 99207 ZZC NO CHARGE NURSE ONLY: CPT

## 2019-10-07 NOTE — PROGRESS NOTES
ANTICOAGULATION FOLLOW-UP CLINIC VISIT    Patient Name:  Shannan Magdaleno  Date:  10/7/2019  Contact Type:  Face to Face    SUBJECTIVE:  Patient Findings     Comments:   Patient denies any identifiable changes that caused the supratherapeutic INR. Patient lost sister last week.          Clinical Outcomes     Negatives:   Major bleeding event, Thromboembolic event, Anticoagulation-related hospital admission, Anticoagulation-related ED visit, Anticoagulation-related fatality    Comments:   Patient denies any identifiable changes that caused the supratherapeutic INR. Patient lost sister last week.             OBJECTIVE    INR Protime   Date Value Ref Range Status   10/07/2019 3.3 (A) 0.86 - 1.14 Final       ASSESSMENT / PLAN  INR assessment SUPRA      Anticoagulation Summary  As of 10/7/2019    INR goal:   2.0-3.0   TTR:   60.1 % (3.5 y)   INR used for dosing:   3.3! (10/7/2019)   Warfarin maintenance plan:   2.5 mg (5 mg x 0.5) every Mon; 5 mg (5 mg x 1) all other days   Full warfarin instructions:   2.5 mg every Mon; 5 mg all other days   Weekly warfarin total:   32.5 mg   No change documented:   Moon Lobato RN   Plan last modified:   Moon Lobato RN (8/21/2019)   Next INR check:   10/21/2019   Priority:   INR   Target end date:   Indefinite    Indications    Paroxysmal atrial fibrillation (H) [I48.0]  Long-term (current) use of anticoagulants [Z79.01] [Z79.01]             Anticoagulation Episode Summary     INR check location:       Preferred lab:       Send INR reminders to:   ARNOLDO FLEMING    Comments:         Anticoagulation Care Providers     Provider Role Specialty Phone number    Adela Morgan MD [facility] Internal Medicine [phone]            See the Encounter Report to view Anticoagulation Flowsheet and Dosing Calendar (Go to Encounters tab in chart review, and find the Anticoagulation Therapy Visit)        Moon Lobato RN

## 2019-10-07 NOTE — PATIENT INSTRUCTIONS
Anticoagulation Clinic:  Please call [phone] with any problems or questions regarding your Warfarin therapy.

## 2019-10-23 ENCOUNTER — ANTICOAGULATION THERAPY VISIT (OUTPATIENT)
Dept: NURSING | Facility: CLINIC | Age: 84
End: 2019-10-23
Payer: COMMERCIAL

## 2019-10-23 DIAGNOSIS — Z79.01 LONG TERM CURRENT USE OF ANTICOAGULANT THERAPY: ICD-10-CM

## 2019-10-23 DIAGNOSIS — I48.0 PAROXYSMAL ATRIAL FIBRILLATION (H): ICD-10-CM

## 2019-10-23 LAB — INR POINT OF CARE: 3 (ref 0.86–1.14)

## 2019-10-23 PROCEDURE — 85610 PROTHROMBIN TIME: CPT | Mod: QW

## 2019-10-23 PROCEDURE — 99207 ZZC NO CHARGE NURSE ONLY: CPT

## 2019-10-23 PROCEDURE — 36416 COLLJ CAPILLARY BLOOD SPEC: CPT

## 2019-10-23 NOTE — PATIENT INSTRUCTIONS
Anticoagulation Clinic:  Please call [phone] with any problems or questions regarding your Warfarin therapy.

## 2019-10-23 NOTE — PROGRESS NOTES
ANTICOAGULATION FOLLOW-UP CLINIC VISIT    Patient Name:  Shannan Magdaleno  Date:  10/23/2019  Contact Type:  Face to Face    SUBJECTIVE:  Patient Findings         Clinical Outcomes     Negatives:   Major bleeding event, Thromboembolic event, Anticoagulation-related hospital admission, Anticoagulation-related ED visit, Anticoagulation-related fatality           OBJECTIVE    INR Protime   Date Value Ref Range Status   10/23/2019 3.0 (A) 0.86 - 1.14 Final       ASSESSMENT / PLAN  No question data found.  Anticoagulation Summary  As of 10/23/2019    INR goal:   2.0-3.0   TTR:   59.4 % (3.6 y)   INR used for dosing:   3.0 (10/23/2019)   Warfarin maintenance plan:   2.5 mg (5 mg x 0.5) every Mon; 5 mg (5 mg x 1) all other days   Full warfarin instructions:   2.5 mg every Mon; 5 mg all other days   Weekly warfarin total:   32.5 mg   No change documented:   Moon Lobato RN   Plan last modified:   Moon Lobato RN (8/21/2019)   Next INR check:   11/13/2019   Priority:   INR   Target end date:   Indefinite    Indications    Paroxysmal atrial fibrillation (H) [I48.0]  Long-term (current) use of anticoagulants [Z79.01] [Z79.01]             Anticoagulation Episode Summary     INR check location:       Preferred lab:       Send INR reminders to:   ARNOLDO FLEMING    Comments:         Anticoagulation Care Providers     Provider Role Specialty Phone number    Adela Morgan MD [facility] Internal Medicine [phone]            See the Encounter Report to view Anticoagulation Flowsheet and Dosing Calendar (Go to Encounters tab in chart review, and find the Anticoagulation Therapy Visit)        Moon Lobato RN

## 2019-10-30 ENCOUNTER — DOCUMENTATION ONLY (OUTPATIENT)
Dept: OPHTHALMOLOGY | Facility: CLINIC | Age: 84
End: 2019-10-30

## 2019-11-11 ENCOUNTER — ANCILLARY PROCEDURE (OUTPATIENT)
Dept: GENERAL RADIOLOGY | Facility: CLINIC | Age: 84
End: 2019-11-11
Attending: NURSE PRACTITIONER
Payer: COMMERCIAL

## 2019-11-11 ENCOUNTER — OFFICE VISIT (OUTPATIENT)
Dept: FAMILY MEDICINE | Facility: CLINIC | Age: 84
End: 2019-11-11
Payer: COMMERCIAL

## 2019-11-11 VITALS
RESPIRATION RATE: 16 BRPM | DIASTOLIC BLOOD PRESSURE: 76 MMHG | TEMPERATURE: 98 F | HEART RATE: 91 BPM | HEIGHT: 62 IN | OXYGEN SATURATION: 98 % | WEIGHT: 144 LBS | SYSTOLIC BLOOD PRESSURE: 122 MMHG | BODY MASS INDEX: 26.5 KG/M2

## 2019-11-11 DIAGNOSIS — R05.9 COUGH: ICD-10-CM

## 2019-11-11 DIAGNOSIS — J06.9 VIRAL URI WITH COUGH: Primary | ICD-10-CM

## 2019-11-11 LAB
DEPRECATED S PYO AG THROAT QL EIA: NORMAL
SPECIMEN SOURCE: NORMAL

## 2019-11-11 PROCEDURE — 87880 STREP A ASSAY W/OPTIC: CPT | Performed by: NURSE PRACTITIONER

## 2019-11-11 PROCEDURE — 99214 OFFICE O/P EST MOD 30 MIN: CPT | Performed by: NURSE PRACTITIONER

## 2019-11-11 PROCEDURE — 71046 X-RAY EXAM CHEST 2 VIEWS: CPT

## 2019-11-11 PROCEDURE — 87081 CULTURE SCREEN ONLY: CPT | Performed by: NURSE PRACTITIONER

## 2019-11-11 ASSESSMENT — MIFFLIN-ST. JEOR: SCORE: 1041.43

## 2019-11-11 ASSESSMENT — PAIN SCALES - GENERAL: PAINLEVEL: NO PAIN (0)

## 2019-11-11 NOTE — PROGRESS NOTES
Subjective     Shannan Magdaleno is a 87 year old female who presents to clinic today for the following health issues:    HPI   Acute Illness   Acute illness concerns: cough  Onset: 1 week ago     Fever: no     Chills/Sweats: no     Headache (location?): no     Sinus Pressure:no    Conjunctivitis:  no    Ear Pain: no    Rhinorrhea: Yes    Congestion: YES    Sore Throat: no      Cough: YES-productive of yellow sputum    Wheeze: no     Decreased Appetite: no     Nausea: no     Vomiting: no     Diarrhea:  no     Dysuria/Freq.: no     Fatigue/Achiness: YES    Sick/Strep Exposure: YES- Kids had cough     Therapies Tried and outcome: Cough syrup     Pleasant 87 year old female in clinic with daughter due to cough x 1 week. Denies fever, shortness of breath, wheezing, upper respiratory infection symptoms. Denies decrease in appetite, increased confusion, or sleeping issues. Patient had been spending time with her grandchildren who had mild colds last week. She has a past history of being hospitalized with pneumonia with rapid increase in severity of symptoms, so daughter has low threshold for bringing the patient in to be seen. Had a flu vaccine. Good appetite. Daughter felt like cough got deeper 2 days ago.     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)     Restless leg syndrome     Hyperlipidemia LDL goal <100     Paroxysmal atrial fibrillation (H)     Tremor     CAD (coronary artery disease)     Retinal dot hemorrhage or microaneuysm, os     Osteopenia     Insomnia     OA (osteoarthritis) of knee     Retroperitoneal lymphadenopathy     Spinal stenosis of lumbar region without neurogenic claudication     Lymphoma, small lymphocytic (H)     Long-term (current) use of anticoagulants [Z79.01]     Posterior vitreous detachment, right     Glaucoma suspect, bilateral     Driving safety issue     Pneumonia of left lower lobe  due to infectious organism (H)     Anemia, unspecified type     Combined forms of age-related cataract, mild-mod, both eyes     Past Surgical History:   Procedure Laterality Date     APPENDECTOMY       BREAST BIOPSY, RT/LT  1955    X 2 - benign     C ANESTH,REPAIR LO ABD HERNIA NOS  1995     CARDIAC CATHERIZATION  1/99 , 12/04    PTCA - Stent X 1     HC REMOVAL GALLBLADDER         Social History     Tobacco Use     Smoking status: Never Smoker     Smokeless tobacco: Never Used   Substance Use Topics     Alcohol use: No     Alcohol/week: 0.0 standard drinks     Family History   Problem Relation Age of Onset     Respiratory Mother      Heart Disease Father      Arthritis Sister      Obesity Sister      Heart Disease Sister      Hypertension Sister      Heart Disease Son      Neurologic Disorder Son         migraines     Arthritis Sister      Heart Disease Daughter      Heart Disease Daughter      Macular Degeneration Daughter      Neurologic Disorder Daughter         migraines     Neurologic Disorder Daughter         migraines     Cancer Paternal Aunt      Macular Degeneration Daughter      Diabetes No family hx of      Cerebrovascular Disease No family hx of      Thyroid Disease No family hx of      Glaucoma No family hx of          Current Outpatient Medications   Medication Sig Dispense Refill     acetaminophen (TYLENOL) 500 MG tablet Take 1-2 tablets by mouth every 6 hours as needed.       ASPIRIN NOT PRESCRIBED (INTENTIONAL) Please choose reason not prescribed, below 0 each 0     calcium-vitamin D (CALTRATE) 600-400 MG-UNIT per tablet Take 1 tablet by mouth daily       escitalopram (LEXAPRO) 10 MG tablet TAKE 1&1/2 TABLETS (15 MG) BY MOUTH DAILY 135 tablet 1     metoprolol tartrate (LOPRESSOR) 50 MG tablet Take 1 tablet (50 mg) by mouth 2 times daily       Multiple Vitamins-Minerals (CENTRUM SILVER) per tablet Take 1 tablet by mouth daily Has 2000 IU of Vitamin D in it. 30 tablet      nitroGLYcerin (NITROSTAT)  "0.4 MG sublingual tablet Place 1 tablet (0.4 mg) under the tongue every 5 minutes as needed for chest pain 25 tablet 3     simvastatin (ZOCOR) 40 MG tablet Take 1 tablet (40 mg) by mouth daily 90 tablet 3     warfarin (COUMADIN) 5 MG tablet Take 2.5 mg on Friday and 5 mg on all other days 90 tablet 3     Allergies   Allergen Reactions     Codeine Nausea and Vomiting     Codeine      DOESN'T FEEL WELL     Latex Itching     Lisinopril      cough     Tamiflu [Oseltamivir] GI Disturbance     Diarrhea and vomiting (would try Relenza)     Atorvastatin Calcium      Myalgias       Sulfa Drugs Nausea and Vomiting     DOES'NT FEEL WELL     BP Readings from Last 3 Encounters:   11/11/19 122/76   09/09/19 122/68   06/03/19 116/58    Wt Readings from Last 3 Encounters:   11/11/19 65.3 kg (144 lb)   09/09/19 64 kg (141 lb)   06/03/19 65.2 kg (143 lb 12.8 oz)                    Reviewed and updated as needed this visit by Provider  Tobacco  Allergies  Meds  Problems  Med Hx  Surg Hx  Fam Hx         Review of Systems   ROS COMP: Constitutional, HEENT, cardiovascular, pulmonary, gi and gu systems are negative, except as otherwise noted.      Objective    /76 (BP Location: Left arm, Patient Position: Sitting, Cuff Size: Adult Regular)   Pulse 91   Temp 98  F (36.7  C) (Oral)   Resp 16   Ht 1.575 m (5' 2\")   Wt 65.3 kg (144 lb)   SpO2 98%   BMI 26.34 kg/m    Body mass index is 26.34 kg/m .  Physical Exam   GENERAL: healthy, alert and no distress  EYES: Eyes grossly normal to inspection, PERRL and conjunctivae and sclerae normal  HENT: ear canals and TM's normal, nose and mouth without ulcers or lesions  NECK: no adenopathy, no asymmetry, masses, or scars and thyroid normal to palpation  RESP: lungs clear to auscultation - no rales, rhonchi or wheezes  CV: regular rate and rhythm, normal S1 S2, no S3 or S4, no murmur, click or rub, no peripheral edema and peripheral pulses strong  SKIN: no suspicious lesions or " rashes  PSYCH: mentation appears normal, affect normal/bright    Diagnostic Test Results:  Labs reviewed in Epic  Results for orders placed or performed in visit on 11/11/19 (from the past 24 hour(s))   Strep, Rapid Screen   Result Value Ref Range    Specimen Description Throat     Rapid Strep A Screen       NEGATIVE: No Group A streptococcal antigen detected by immunoassay, await culture report.     CHEST TWO VIEWS November 11, 2019 1:54 PM      HISTORY: Cough.     COMPARISON: 6/3/2019.                                                                      IMPRESSION: Heart size upper limits of normal. Pulmonary vascularity  is within normal limits. Coronary artery calcification and/or  stenting. Lungs clear. No pleural effusions.     JU LEIVA MD        Assessment & Plan     1. Viral URI with cough  Normal x-ray. Daughter will continue to closely monitor. Discussed supportive interventions. Antibiotics not currently indicated as likely viral. Patient with hx of pneumonia and daughter states this is not like that at this point however will have low threshold to RTC if not improving.  - Strep, Rapid Screen  - XR Chest 2 Views; Future  - Beta strep group A culture    Call daughterMinerva with XR results       See Patient Instructions    Return in about 4 days (around 11/15/2019), or if symptoms worsen or fail to improve.     The benefits, risks and potential side effects were discussed in detail. Black box warnings discussed as relevant. All patient questions were answered. The patient was instructed to follow up immediately if any adverse reactions develop.    Return precautions discussed, including when to seek urgent/emergent care.    Patient verbalizes understanding and agrees with plan of care. Patient stable for discharge.      ANDREW Jamil [facility]

## 2019-11-11 NOTE — PATIENT INSTRUCTIONS
At [facility], we strive to deliver an exceptional experience to you, every time we see you. If you receive a survey, please complete it as we do value your feedback.  If you have MyChart, you can expect to receive results automatically within 24 hours of their completion.  Your provider will send a note interpreting your results as well.   If you do not have MyChart, you should receive your results in about a week by mail.    Your care team:                            Family Medicine Internal Medicine   MD Madhu Israel MD Shantel Branch-Fleming, MD Katya Georgiev PA-C Megan Hill, APRSARANYA Burnette, MD Pediatrics   Bert Tolentino, PAMichaelC  Scarlett Duenas, MD Esther Alaniz APRN CNP   MD Cristal Miguel MD Deborah Mielke, MD Kim Thein, APRN CNP      Clinic hours: Monday - Thursday 7 am-7 pm; Fridays 7 am-5 pm.   Urgent care: Monday - Friday 11 am-9 pm; Saturday and Sunday 9 am-5 pm.  Pharmacy : Monday -Thursday 8 am-8 pm; Friday 8 am-6 pm; Saturday and Sunday 9 am-5 pm.     Clinic: [phone]   Pharmacy: [phone]

## 2019-11-12 LAB
BACTERIA SPEC CULT: NORMAL
SPECIMEN SOURCE: NORMAL

## 2019-11-12 ASSESSMENT — PATIENT HEALTH QUESTIONNAIRE - PHQ9: SUM OF ALL RESPONSES TO PHQ QUESTIONS 1-9: 1

## 2019-11-13 ENCOUNTER — ANTICOAGULATION THERAPY VISIT (OUTPATIENT)
Dept: NURSING | Facility: CLINIC | Age: 84
End: 2019-11-13
Payer: COMMERCIAL

## 2019-11-13 DIAGNOSIS — Z79.01 LONG TERM CURRENT USE OF ANTICOAGULANT THERAPY: ICD-10-CM

## 2019-11-13 DIAGNOSIS — I48.0 PAROXYSMAL ATRIAL FIBRILLATION (H): ICD-10-CM

## 2019-11-13 LAB — INR POINT OF CARE: 3.2 (ref 0.86–1.14)

## 2019-11-13 PROCEDURE — 36416 COLLJ CAPILLARY BLOOD SPEC: CPT

## 2019-11-13 PROCEDURE — 85610 PROTHROMBIN TIME: CPT | Mod: QW

## 2019-11-13 PROCEDURE — 99207 ZZC NO CHARGE NURSE ONLY: CPT

## 2019-11-13 NOTE — PROGRESS NOTES
ANTICOAGULATION FOLLOW-UP CLINIC VISIT    Patient Name:  Shannan Magdaleno  Date:  11/13/2019  Contact Type:  Face to Face    SUBJECTIVE:  Patient Findings     Positives:   Change in medications (taking cough medicine)        Clinical Outcomes     Negatives:   Major bleeding event, Thromboembolic event, Anticoagulation-related hospital admission, Anticoagulation-related ED visit, Anticoagulation-related fatality           OBJECTIVE    INR Protime   Date Value Ref Range Status   11/13/2019 3.2 (A) 0.86 - 1.14 Final       ASSESSMENT / PLAN  INR assessment SUPRA    Recheck INR In: 2 WEEKS    INR Location Clinic      Anticoagulation Summary  As of 11/13/2019    INR goal:   2.0-3.0   TTR:   58.4 % (3.6 y)   INR used for dosing:   3.2! (11/13/2019)   Warfarin maintenance plan:   2.5 mg (5 mg x 0.5) every Mon; 5 mg (5 mg x 1) all other days   Full warfarin instructions:   11/14: 2.5 mg; Otherwise 2.5 mg every Mon; 5 mg all other days   Weekly warfarin total:   32.5 mg   Plan last modified:   Moon Lobato RN (8/21/2019)   Next INR check:   12/4/2019   Priority:   INR   Target end date:   Indefinite    Indications    Paroxysmal atrial fibrillation (H) [I48.0]  Long-term (current) use of anticoagulants [Z79.01] [Z79.01]             Anticoagulation Episode Summary     INR check location:       Preferred lab:       Send INR reminders to:   ARNOLDO FLEMING    Comments:         Anticoagulation Care Providers     Provider Role Specialty Phone number    Adela Morgan MD [facility] Internal Medicine [phone]            See the Encounter Report to view Anticoagulation Flowsheet and Dosing Calendar (Go to Encounters tab in chart review, and find the Anticoagulation Therapy Visit)        Aline Oreilly RN

## 2019-11-14 ENCOUNTER — OFFICE VISIT (OUTPATIENT)
Dept: FAMILY MEDICINE | Facility: CLINIC | Age: 84
End: 2019-11-14
Payer: COMMERCIAL

## 2019-11-14 VITALS
HEART RATE: 86 BPM | TEMPERATURE: 98.3 F | DIASTOLIC BLOOD PRESSURE: 66 MMHG | OXYGEN SATURATION: 99 % | SYSTOLIC BLOOD PRESSURE: 106 MMHG | WEIGHT: 140 LBS | HEIGHT: 62 IN | RESPIRATION RATE: 16 BRPM | BODY MASS INDEX: 25.76 KG/M2

## 2019-11-14 DIAGNOSIS — Z79.01 LONG TERM CURRENT USE OF ANTICOAGULANT THERAPY: ICD-10-CM

## 2019-11-14 DIAGNOSIS — J20.9 ACUTE BRONCHITIS WITH SYMPTOMS > 10 DAYS: Primary | ICD-10-CM

## 2019-11-14 PROCEDURE — 99214 OFFICE O/P EST MOD 30 MIN: CPT | Performed by: NURSE PRACTITIONER

## 2019-11-14 RX ORDER — AZITHROMYCIN 250 MG/1
TABLET, FILM COATED ORAL
Qty: 6 TABLET | Refills: 0 | Status: SHIPPED | OUTPATIENT
Start: 2019-11-14 | End: 2020-02-12

## 2019-11-14 RX ORDER — ALBUTEROL SULFATE 90 UG/1
2 AEROSOL, METERED RESPIRATORY (INHALATION) EVERY 4 HOURS PRN
Qty: 1 INHALER | Refills: 1 | Status: SHIPPED | OUTPATIENT
Start: 2019-11-14 | End: 2020-03-10

## 2019-11-14 ASSESSMENT — PAIN SCALES - GENERAL: PAINLEVEL: NO PAIN (0)

## 2019-11-14 ASSESSMENT — MIFFLIN-ST. JEOR: SCORE: 1023.29

## 2019-11-14 NOTE — PROGRESS NOTES
Subjective     Shannan Magdaleno is a 87 year old female who presents to clinic today for the following health issues:    HPI   Acute Illness Recheck   Acute illness concerns: URI  Onset: ongoing    Fever: no     Chills/Sweats: YES- intermittent chills    Headache (location?): no     Sinus Pressure:no    Conjunctivitis:  no    Ear Pain: no    Rhinorrhea: YES    Congestion: no     Sore Throat: no      Cough: YES-productive of yellow sputum    Wheeze: no     Decreased Appetite: YES    Nausea: no     Vomiting: no    Diarrhea:  no    Dysuria/Freq.: no    Fatigue/Achiness: no    Sick/Strep Exposure: no      Therapies Tried and outcome: OTC cough meds: no relief  No shortness of breath   No chest pain  No dizziness  Symptoms not improved since last visit      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)     Restless leg syndrome     Hyperlipidemia LDL goal <100     Paroxysmal atrial fibrillation (H)     Tremor     CAD (coronary artery disease)     Retinal dot hemorrhage or microaneuysm, os     Osteopenia     Insomnia     OA (osteoarthritis) of knee     Retroperitoneal lymphadenopathy     Spinal stenosis of lumbar region without neurogenic claudication     Lymphoma, small lymphocytic (H)     Long-term (current) use of anticoagulants [Z79.01]     Posterior vitreous detachment, right     Glaucoma suspect, bilateral     Driving safety issue     Pneumonia of left lower lobe due to infectious organism (H)     Anemia, unspecified type     Combined forms of age-related cataract, mild-mod, both eyes     Past Surgical History:   Procedure Laterality Date     APPENDECTOMY       BREAST BIOPSY, RT/LT  1955    X 2 - benign     C ANESTH,REPAIR LO ABD HERNIA NOS  1995     CARDIAC CATHERIZATION  1/99 , 12/04    PTCA - Stent X 1     HC REMOVAL GALLBLADDER         Social History     Tobacco Use     Smoking status: Never Smoker      Smokeless tobacco: Never Used   Substance Use Topics     Alcohol use: No     Alcohol/week: 0.0 standard drinks     Family History   Problem Relation Age of Onset     Respiratory Mother      Heart Disease Father      Arthritis Sister      Obesity Sister      Heart Disease Sister      Hypertension Sister      Heart Disease Son      Neurologic Disorder Son         migraines     Arthritis Sister      Heart Disease Daughter      Heart Disease Daughter      Macular Degeneration Daughter      Neurologic Disorder Daughter         migraines     Neurologic Disorder Daughter         migraines     Cancer Paternal Aunt      Macular Degeneration Daughter      Diabetes No family hx of      Cerebrovascular Disease No family hx of      Thyroid Disease No family hx of      Glaucoma No family hx of          Current Outpatient Medications   Medication Sig Dispense Refill     acetaminophen (TYLENOL) 500 MG tablet Take 1-2 tablets by mouth every 6 hours as needed.       albuterol (PROAIR HFA/PROVENTIL HFA/VENTOLIN HFA) 108 (90 Base) MCG/ACT inhaler Inhale 2 puffs into the lungs every 4 hours as needed for shortness of breath / dyspnea or wheezing 1 Inhaler 1     ASPIRIN NOT PRESCRIBED (INTENTIONAL) Please choose reason not prescribed, below 0 each 0     azithromycin (ZITHROMAX) 250 MG tablet Take 2 tablets (500 mg) by mouth daily for 1 day, THEN 1 tablet (250 mg) daily for 4 days. 6 tablet 0     calcium-vitamin D (CALTRATE) 600-400 MG-UNIT per tablet Take 1 tablet by mouth daily       escitalopram (LEXAPRO) 10 MG tablet TAKE 1&1/2 TABLETS (15 MG) BY MOUTH DAILY 135 tablet 1     metoprolol tartrate (LOPRESSOR) 50 MG tablet Take 1 tablet (50 mg) by mouth 2 times daily       Multiple Vitamins-Minerals (CENTRUM SILVER) per tablet Take 1 tablet by mouth daily Has 2000 IU of Vitamin D in it. 30 tablet      nitroGLYcerin (NITROSTAT) 0.4 MG sublingual tablet Place 1 tablet (0.4 mg) under the tongue every 5 minutes as needed for chest pain 25  "tablet 3     simvastatin (ZOCOR) 40 MG tablet Take 1 tablet (40 mg) by mouth daily 90 tablet 3     warfarin (COUMADIN) 5 MG tablet Take 2.5 mg on Friday and 5 mg on all other days 90 tablet 3     Allergies   Allergen Reactions     Codeine Nausea and Vomiting     Codeine      DOESN'T FEEL WELL     Latex Itching     Lisinopril      cough     Tamiflu [Oseltamivir] GI Disturbance     Diarrhea and vomiting (would try Relenza)     Atorvastatin Calcium      Myalgias       Sulfa Drugs Nausea and Vomiting     DOES'NT FEEL WELL         Reviewed and updated as needed this visit by Provider         Review of Systems   ROS COMP: Constitutional, HEENT, cardiovascular, pulmonary, gi and gu systems are negative, except as otherwise noted.      Objective    /66 (BP Location: Left arm, Patient Position: Chair, Cuff Size: Adult Regular)   Pulse 86   Temp 98.3  F (36.8  C) (Oral)   Resp 16   Ht 1.575 m (5' 2\")   Wt 63.5 kg (140 lb)   LMP  (LMP Unknown)   SpO2 99%   Breastfeeding No   BMI 25.61 kg/m    Body mass index is 25.61 kg/m .  Physical Exam   GENERAL: healthy, alert and no distress  EYES: Eyes grossly normal to inspection, PERRL and conjunctivae and sclerae normal  HENT: normal cephalic/atraumatic, ear canals and TM's normal, nasal mucosa edematous , rhinorrhea clear, oropharynx clear, oral mucous membranes moist and sinuses: not tender  NECK: no adenopathy, no asymmetry, masses, or scars and thyroid normal to palpation  RESP: rhonchi scattered throughout bilaterally (mild) and expiratory wheezes R upper posterior, L upper posterior, bilateral and mild  CV: irregularly irregular rhythm, normal S1 S2, no S3 or S4, no murmur, click or rub, peripheral pulses strong and no peripheral edema  ABDOMEN: soft, nontender, no hepatosplenomegaly, no masses and bowel sounds normal  MS: no gross musculoskeletal defects noted, no edema    Diagnostic Test Results:  none         Assessment & Plan     1. Acute bronchitis with " "symptoms > 10 days  Will treat as below.  Macrolide to cover for pneumonia.  Albuterol if needed.  Follow up scheduled for 4 days.  Also scheduled for follow up with INR in 4 days given starting antibiotics today.    - albuterol (PROAIR HFA/PROVENTIL HFA/VENTOLIN HFA) 108 (90 Base) MCG/ACT inhaler; Inhale 2 puffs into the lungs every 4 hours as needed for shortness of breath / dyspnea or wheezing  Dispense: 1 Inhaler; Refill: 1  - azithromycin (ZITHROMAX) 250 MG tablet; Take 2 tablets (500 mg) by mouth daily for 1 day, THEN 1 tablet (250 mg) daily for 4 days.  Dispense: 6 tablet; Refill: 0     BMI:   Estimated body mass index is 25.61 kg/m  as calculated from the following:    Height as of this encounter: 1.575 m (5' 2\").    Weight as of this encounter: 63.5 kg (140 lb).           Patient Instructions     Start azithromycin as prescribed    Can take albuterol inhaler every 4 hours as needed for coughing, trouble breathing, or wheezing.    Follow-up Monday with provider AND with coumadin nurses.  Patient Education     Using an Inhaler  Your healthcare provider may prescribe medicine that you breathe in using a metered-dose inhaler (MDI). An inhaler sends a measured amount of medicine in a fine mist.  Step 1:    Shake the inhaler and remove the cap.    Take a deep breath and let it out.  Step 2:    Close your lips around the end of the inhaler mouthpiece. Or if you were told to use the \"open-mouth\" method, hold the inhaler 1 to 2 inches from your mouth.  Step 3:    Breathe in slowly and deeply as you press down on the inhaler to release the medicine.    Inhale fully.  Step 4:    Hold your breath for a count of 10, or as long as you can comfortably.    Then breathe out slowly through your mouth.    Repeat these steps for each puff of medicine prescribed.             Important    If the inhaler is being used for the first time or has not been used for a while, prime it as directed by the product maker. You can find " important information about the medicine in the package insert. This is the paper that comes with the medicine.    If you use more than one inhaler, make sure you know which one to use first.    Your healthcare provider or pharmacist can show you how to use your inhaler the right way. Even if you think you are using it the right way, it is still a good idea to check.   Date Last Reviewed: 10/1/2016    4774-0272 [employer]. [address] [place] [address], [place] [address]. All rights reserved. This information is not intended as a substitute for professional medical care. Always follow your healthcare professional's instructions.               Return in about 4 days (around 11/18/2019) for Follow Up.    ANDREW Perez [facility]

## 2019-11-14 NOTE — PATIENT INSTRUCTIONS
"Start azithromycin as prescribed    Can take albuterol inhaler every 4 hours as needed for coughing, trouble breathing, or wheezing.    Follow-up Monday with provider AND with coumadin nurses.  Patient Education     Using an Inhaler  Your healthcare provider may prescribe medicine that you breathe in using a metered-dose inhaler (MDI). An inhaler sends a measured amount of medicine in a fine mist.  Step 1:    Shake the inhaler and remove the cap.    Take a deep breath and let it out.  Step 2:    Close your lips around the end of the inhaler mouthpiece. Or if you were told to use the \"open-mouth\" method, hold the inhaler 1 to 2 inches from your mouth.  Step 3:    Breathe in slowly and deeply as you press down on the inhaler to release the medicine.    Inhale fully.  Step 4:    Hold your breath for a count of 10, or as long as you can comfortably.    Then breathe out slowly through your mouth.    Repeat these steps for each puff of medicine prescribed.             Important    If the inhaler is being used for the first time or has not been used for a while, prime it as directed by the product maker. You can find important information about the medicine in the package insert. This is the paper that comes with the medicine.    If you use more than one inhaler, make sure you know which one to use first.    Your healthcare provider or pharmacist can show you how to use your inhaler the right way. Even if you think you are using it the right way, it is still a good idea to check.   Date Last Reviewed: 10/1/2016    7678-1976 [employer]. [address] [place] [address], [place] [address]. All rights reserved. This information is not intended as a substitute for professional medical care. Always follow your healthcare professional's instructions.           "

## 2019-11-20 DIAGNOSIS — I48.0 PAROXYSMAL ATRIAL FIBRILLATION (H): Primary | ICD-10-CM

## 2019-11-21 ENCOUNTER — TELEPHONE (OUTPATIENT)
Dept: FAMILY MEDICINE | Facility: CLINIC | Age: 84
End: 2019-11-21

## 2019-11-21 DIAGNOSIS — Z79.01 LONG TERM CURRENT USE OF ANTICOAGULANT THERAPY: ICD-10-CM

## 2019-11-21 DIAGNOSIS — I48.0 PAROXYSMAL ATRIAL FIBRILLATION (H): ICD-10-CM

## 2019-11-21 LAB — INR PPP: 3.9 (ref 0.86–1.14)

## 2019-11-21 PROCEDURE — 85610 PROTHROMBIN TIME: CPT | Performed by: INTERNAL MEDICINE

## 2019-11-21 PROCEDURE — 36415 COLL VENOUS BLD VENIPUNCTURE: CPT | Performed by: INTERNAL MEDICINE

## 2019-11-21 NOTE — TELEPHONE ENCOUNTER
Anticoagulation Management    Unable to reach Shannan today.    Today's INR result of 3.9 is supratherapeutic (goal INR of 2.0-3.0).  Result received from: Clinic Lab    Follow up required to confirm warfarin dose taken and assess for changes    Left message to hold warfarin tonight.      Anticoagulation clinic to follow up    Sussy Leslie RN

## 2019-11-22 NOTE — TELEPHONE ENCOUNTER
ANTICOAGULATION MANAGEMENT     Patient Name:  Shannan Magdaleno  Date:  11/22/2019    ASSESSMENT /SUBJECTIVE:      Yesterday's INR result of 3.9 is supratherapeutic. Goal INR of 2.0-3.0      Warfarin dose taken: Dose verbally confirmed with patient's daughter Minerva. Patient takes her warfarin in the am so she did take her usual dose yesterday and she already took her warfarin today.     Diet: No new diet changes affecting INR    Medication changes/ interactions: Interaction between recent Z pack and warfarin may be affecting INR (finished on Tue)    Previous INR: Supratherapeutic, likely d/t illness    S/S of bleeding or thromboembolism: No    New injury or illness:  No, URI symptoms are improving    Upcoming surgery, procedure or cardioversion:  No    Additional findings: haily Palma sets up meds in a med box with an alarm. Warfarin is set up in the am slot. Patient is able to identify the warfarin tablet and self adjust dose but same day dose adjustments are difficult if she does not get a call before she takes her meds.       PLAN:    Spoke with Shannan and daughter Minerva regarding INR result and instructed:     Warfarin Dosing Instructions: Warfarin was unfortunately taken yesterday and today. Therefore instructed to hold warfarin on Sat 11/23 and partial hold on Sun 11/24 (take 2.5mg) then continue your current warfarin dose of 2.5mg on Mon, 5mg ROW    Instructed patient to follow up no later than: next week, appointment scheduled on Wed 11/27    Education provided: Yes: monitor for s/sx of bleeding, when to seek medical care, potential interaction with illness and recent abx and warfarin      Shannan and Minerva verbalized understanding and agreed to warfarin dosing plan.    Instructed to call the Anticoagulation Clinic for any changes, questions or concerns. (#[phone])        OBJECTIVE:  INR   Date Value Ref Range Status   11/21/2019 3.90 (H) 0.86 - 1.14 Final             Anticoagulation Summary  As of  11/21/2019    INR goal:   2.0-3.0   TTR:   37.9 % (1 y)   INR used for dosing:   3.90! (11/21/2019)   Warfarin maintenance plan:   2.5 mg (5 mg x 0.5) every Mon; 5 mg (5 mg x 1) all other days   Full warfarin instructions:   11/23: Hold; 11/24: 2.5 mg; Otherwise 2.5 mg every Mon; 5 mg all other days   Weekly warfarin total:   32.5 mg   Plan last modified:   Xiomy Ulloa RN (11/22/2019)   Next INR check:   11/27/2019   Priority:   High   Target end date:   Indefinite    Indications    Paroxysmal atrial fibrillation (H) [I48.0]  Long-term (current) use of anticoagulants [Z79.01] [Z79.01]             Anticoagulation Episode Summary     INR check location:       Preferred lab:       Send INR reminders to:   ARNOLDO FLEMING    Comments:         Anticoagulation Care Providers     Provider Role Specialty Phone number    Adela Morgan MD [facility] Internal Medicine [phone]

## 2019-11-26 ENCOUNTER — TELEPHONE (OUTPATIENT)
Dept: FAMILY MEDICINE | Facility: CLINIC | Age: 84
End: 2019-11-26

## 2019-11-26 DIAGNOSIS — Z79.01 LONG TERM CURRENT USE OF ANTICOAGULANT THERAPY: ICD-10-CM

## 2019-11-26 DIAGNOSIS — I48.0 PAROXYSMAL ATRIAL FIBRILLATION (H): ICD-10-CM

## 2019-11-26 LAB
CAPILLARY BLOOD COLLECTION: NORMAL
INR PPP: 2 (ref 0.86–1.14)

## 2019-11-26 PROCEDURE — 36416 COLLJ CAPILLARY BLOOD SPEC: CPT | Performed by: INTERNAL MEDICINE

## 2019-11-26 PROCEDURE — 85610 PROTHROMBIN TIME: CPT | Performed by: INTERNAL MEDICINE

## 2019-11-26 NOTE — TELEPHONE ENCOUNTER
ANTICOAGULATION MANAGEMENT     Patient Name:  Shannan Magdaleno  Date:  11/26/2019    ASSESSMENT /SUBJECTIVE:      Today's INR result of 2.00 is therapeutic. Goal INR of 2.0-3.0      Warfarin dose taken: Warfarin taken differently than instructed, but no impact to total weekly dose    Diet: No new diet changes affecting INR    Medication changes/ interactions: No new medications/supplements affecting INR finished zpak    Previous INR: Supratherapeutic     S/S of bleeding or thromboembolism: No    New injury or illness:  No    Upcoming surgery, procedure or cardioversion:  No    Additional findings: none      PLAN:    Spoke with Minerva regarding INR result and instructed:     Warfarin Dosing Instructions: 2.5mg MF and 5mg the rest of the days of the week    Instructed patient to follow up no later than: 12/4/19    Education provided: Shey Palma verbalizes understanding and agrees to warfarin dosing plan.    Instructed to call the Anticoagulation Clinic for any changes, questions or concerns. (#[phone])        OBJECTIVE:  INR   Date Value Ref Range Status   11/26/2019 2.00 (H) 0.86 - 1.14 Final             Anticoagulation Summary  As of 11/26/2019    INR goal:   2.0-3.0   TTR:   37.4 % (1 y)   INR used for dosing:      Warfarin maintenance plan:   2.5 mg (5 mg x 0.5) every Mon; 5 mg (5 mg x 1) all other days   Full warfarin instructions:   2.5 mg every Mon; 5 mg all other days   Weekly warfarin total:   32.5 mg   Plan last modified:   Xiomy Ulloa RN (11/22/2019)   Next INR check:      Target end date:   Indefinite    Indications    Paroxysmal atrial fibrillation (H) [I48.0]  Long-term (current) use of anticoagulants [Z79.01] [Z79.01]             Anticoagulation Episode Summary     INR check location:       Preferred lab:       Send INR reminders to:   ARNOLDO FLEMING    Comments:   Daughter Minerva sets up meds in a med box with alarm reminder. Warfarin is set up in the AM meds. Pt does know which pill is her  warfarin but it can be difficult to do same day dose adjustments      Anticoagulation Care Providers     Provider Role Specialty Phone number    Adela Morgan MD [facility] Internal Medicine [phone]

## 2019-12-04 ENCOUNTER — ANTICOAGULATION THERAPY VISIT (OUTPATIENT)
Dept: NURSING | Facility: CLINIC | Age: 84
End: 2019-12-04
Payer: COMMERCIAL

## 2019-12-04 DIAGNOSIS — Z79.01 LONG TERM CURRENT USE OF ANTICOAGULANT THERAPY: ICD-10-CM

## 2019-12-04 DIAGNOSIS — I48.0 PAROXYSMAL ATRIAL FIBRILLATION (H): ICD-10-CM

## 2019-12-04 LAB — INR POINT OF CARE: 2.9 (ref 0.86–1.14)

## 2019-12-04 PROCEDURE — 99207 ZZC NO CHARGE NURSE ONLY: CPT

## 2019-12-04 PROCEDURE — 85610 PROTHROMBIN TIME: CPT | Mod: QW

## 2019-12-04 PROCEDURE — 36416 COLLJ CAPILLARY BLOOD SPEC: CPT

## 2019-12-04 NOTE — PATIENT INSTRUCTIONS
Anticoagulation Clinic:  Please call [phone] with any problems or questions regarding your Warfarin therapy.

## 2019-12-04 NOTE — PROGRESS NOTES
ANTICOAGULATION FOLLOW-UP CLINIC VISIT    Patient Name:  Shannan Magdaleno  Date:  2019  Contact Type:  Face to Face    SUBJECTIVE:  Patient Findings     Comments:   The patient was assessed for diet, medication, and activity level changes, missed or extra doses, bruising or bleeding, with no problem findings.          Clinical Outcomes     Negatives:   Major bleeding event, Thromboembolic event, Anticoagulation-related hospital admission, Anticoagulation-related ED visit, Anticoagulation-related fatality    Comments:   The patient was assessed for diet, medication, and activity level changes, missed or extra doses, bruising or bleeding, with no problem findings.             OBJECTIVE    INR Protime   Date Value Ref Range Status   2019 2.9 (A) 0.86 - 1.14 Final       ASSESSMENT / PLAN  No question data found.  Anticoagulation Summary  As of 2019    INR goal:   2.0-3.0   TTR:   37.3 % (1 y)   INR used for dosin.9 (2019)   Warfarin maintenance plan:   2.5 mg (5 mg x 0.5) every Mon, Fri; 5 mg (5 mg x 1) all other days   Full warfarin instructions:   2.5 mg every Mon, Fri; 5 mg all other days   Weekly warfarin total:   30 mg   No change documented:   Moon Lobato RN   Plan last modified:   Josiane Dalal RN (2019)   Next INR check:   2019   Priority:   High   Target end date:   Indefinite    Indications    Paroxysmal atrial fibrillation (H) [I48.0]  Long-term (current) use of anticoagulants [Z79.01] [Z79.01]             Anticoagulation Episode Summary     INR check location:       Preferred lab:       Send INR reminders to:   ARNOLDO FLEMING    Comments:   Radha Palma sets up meds in a med box with alarm reminder. Warfarin is set up in the AM meds. Pt does know which pill is her warfarin but it can be difficult to do same day dose adjustments      Anticoagulation Care Providers     Provider Role Specialty Phone number    Adela Morgan MD Responsible Internal  Medicine [phone]            See the Encounter Report to view Anticoagulation Flowsheet and Dosing Calendar (Go to Encounters tab in chart review, and find the Anticoagulation Therapy Visit)        Moon Lobato RN

## 2019-12-18 ENCOUNTER — ANTICOAGULATION THERAPY VISIT (OUTPATIENT)
Dept: NURSING | Facility: CLINIC | Age: 84
End: 2019-12-18
Payer: COMMERCIAL

## 2019-12-18 ENCOUNTER — ALLIED HEALTH/NURSE VISIT (OUTPATIENT)
Dept: NURSING | Facility: CLINIC | Age: 84
End: 2019-12-18
Payer: COMMERCIAL

## 2019-12-18 VITALS — SYSTOLIC BLOOD PRESSURE: 122 MMHG | DIASTOLIC BLOOD PRESSURE: 76 MMHG

## 2019-12-18 DIAGNOSIS — Z79.01 LONG TERM CURRENT USE OF ANTICOAGULANT THERAPY: ICD-10-CM

## 2019-12-18 DIAGNOSIS — Z01.30 BP CHECK: Primary | ICD-10-CM

## 2019-12-18 DIAGNOSIS — I48.0 PAROXYSMAL ATRIAL FIBRILLATION (H): ICD-10-CM

## 2019-12-18 LAB — INR POINT OF CARE: 3.4 (ref 0.86–1.14)

## 2019-12-18 PROCEDURE — 99207 ZZC NO CHARGE NURSE ONLY: CPT

## 2019-12-18 PROCEDURE — 36416 COLLJ CAPILLARY BLOOD SPEC: CPT

## 2019-12-18 PROCEDURE — 85610 PROTHROMBIN TIME: CPT | Mod: QW

## 2019-12-18 NOTE — PROGRESS NOTES
ANTICOAGULATION FOLLOW-UP CLINIC VISIT    Patient Name:  Shannan Magdaleno  Date:  12/18/2019  Contact Type:  Face to Face    SUBJECTIVE:  Patient Findings     Comments:   Patient denies any identifiable changes that caused the supratherapeutic INR. The patient was assessed for diet, medication, and activity level changes, missed or extra doses, bruising or bleeding, with no problem findings.  Maintenance dose decreased by 8%          Clinical Outcomes     Negatives:   Major bleeding event, Thromboembolic event, Anticoagulation-related hospital admission, Anticoagulation-related ED visit, Anticoagulation-related fatality    Comments:   Patient denies any identifiable changes that caused the supratherapeutic INR. The patient was assessed for diet, medication, and activity level changes, missed or extra doses, bruising or bleeding, with no problem findings.  Maintenance dose decreased by 8%             OBJECTIVE    INR Protime   Date Value Ref Range Status   12/18/2019 3.4 (A) 0.86 - 1.14 Final       ASSESSMENT / PLAN  No question data found.  Anticoagulation Summary  As of 12/18/2019    INR goal:   2.0-3.0   TTR:   36.7 % (1 y)   INR used for dosing:   3.4! (12/18/2019)   Warfarin maintenance plan:   2.5 mg (5 mg x 0.5) every Mon, Wed, Fri; 5 mg (5 mg x 1) all other days   Full warfarin instructions:   2.5 mg every Mon, Wed, Fri; 5 mg all other days   Weekly warfarin total:   27.5 mg   Plan last modified:   Moon Lobato RN (12/18/2019)   Next INR check:   1/2/2020   Priority:   High   Target end date:   Indefinite    Indications    Paroxysmal atrial fibrillation (H) [I48.0]  Long-term (current) use of anticoagulants [Z79.01] [Z79.01]             Anticoagulation Episode Summary     INR check location:       Preferred lab:       Send INR reminders to:   ARNOLDO FLEMING    Comments:   Daughter Minerva sets up meds in a med box with alarm reminder. Warfarin is set up in the AM meds. Pt does know which pill is her  warfarin but it can be difficult to do same day dose adjustments      Anticoagulation Care Providers     Provider Role Specialty Phone number    Adela Morgan MD [facility] Internal Medicine [phone]            See the Encounter Report to view Anticoagulation Flowsheet and Dosing Calendar (Go to Encounters tab in chart review, and find the Anticoagulation Therapy Visit)        Moon Lobato RN

## 2019-12-18 NOTE — PATIENT INSTRUCTIONS
Anticoagulation Clinic:  Please call [phone] with any problems or questions regarding your Warfarin therapy.

## 2019-12-18 NOTE — PROGRESS NOTES
Shannan Magdaleno is a 88 year old patient who comes in today for a Blood Pressure check.  Initial BP:  /76   LMP  (LMP Unknown)      Data Unavailable  Disposition: results routed to provider

## 2019-12-31 DIAGNOSIS — F41.9 ANXIETY: ICD-10-CM

## 2020-01-02 ENCOUNTER — ANTICOAGULATION THERAPY VISIT (OUTPATIENT)
Dept: NURSING | Facility: CLINIC | Age: 85
End: 2020-01-02
Payer: COMMERCIAL

## 2020-01-02 DIAGNOSIS — Z79.01 LONG TERM CURRENT USE OF ANTICOAGULANT THERAPY: ICD-10-CM

## 2020-01-02 DIAGNOSIS — I48.0 PAROXYSMAL ATRIAL FIBRILLATION (H): ICD-10-CM

## 2020-01-02 LAB — INR POINT OF CARE: 2.4 (ref 0.86–1.14)

## 2020-01-02 PROCEDURE — 36416 COLLJ CAPILLARY BLOOD SPEC: CPT

## 2020-01-02 PROCEDURE — 85610 PROTHROMBIN TIME: CPT | Mod: QW

## 2020-01-02 PROCEDURE — 99207 ZZC NO CHARGE NURSE ONLY: CPT

## 2020-01-02 NOTE — PROGRESS NOTES
ANTICOAGULATION FOLLOW-UP CLINIC VISIT    Patient Name:  Shannan Magdaleno  Date:  2020  Contact Type:  Face to Face    SUBJECTIVE:  Patient Findings     Comments:   The patient was assessed for diet, medication, and activity level changes, missed or extra doses, bruising or bleeding, with no problem findings.          Clinical Outcomes     Negatives:   Major bleeding event, Thromboembolic event, Anticoagulation-related hospital admission, Anticoagulation-related ED visit, Anticoagulation-related fatality    Comments:   The patient was assessed for diet, medication, and activity level changes, missed or extra doses, bruising or bleeding, with no problem findings.             OBJECTIVE    INR Protime   Date Value Ref Range Status   2020 2.4 (A) 0.86 - 1.14 Final       ASSESSMENT / PLAN  INR assessment THER    Recheck INR In: 4 WEEKS    INR Location Clinic      Anticoagulation Summary  As of 2020    INR goal:   2.0-3.0   TTR:   39.2 % (1 y)   INR used for dosin.4 (2020)   Warfarin maintenance plan:   2.5 mg (5 mg x 0.5) every Mon, Wed, Fri; 5 mg (5 mg x 1) all other days   Full warfarin instructions:   2.5 mg every Mon, Wed, Fri; 5 mg all other days   Weekly warfarin total:   27.5 mg   No change documented:   Moon Lobato RN   Plan last modified:   Moon Lobato RN (2019)   Next INR check:   2020   Priority:   High   Target end date:   Indefinite    Indications    Paroxysmal atrial fibrillation (H) [I48.0]  Long-term (current) use of anticoagulants [Z79.01] [Z79.01]             Anticoagulation Episode Summary     INR check location:       Preferred lab:       Send INR reminders to:   ARNOLDO FLEMING    Comments:   Radha Palma sets up meds in a med box with alarm reminder. Warfarin is set up in the AM meds. Pt does know which pill is her warfarin but it can be difficult to do same day dose adjustments      Anticoagulation Care Providers     Provider Role Specialty Phone number     Adela Morgan MD [facility] Internal Medicine [phone]            See the Encounter Report to view Anticoagulation Flowsheet and Dosing Calendar (Go to Encounters tab in chart review, and find the Anticoagulation Therapy Visit)        Moon Lobato RN

## 2020-01-02 NOTE — PATIENT INSTRUCTIONS
Anticoagulation Clinic:  Please call [phone] with any problems or questions regarding your Warfarin therapy.

## 2020-01-03 RX ORDER — ESCITALOPRAM OXALATE 10 MG/1
TABLET ORAL
Qty: 135 TABLET | Refills: 0 | Status: SHIPPED | OUTPATIENT
Start: 2020-01-03 | End: 2020-03-10

## 2020-01-03 NOTE — TELEPHONE ENCOUNTER
"Prescription approved per [facility] Refill Protocol.    Requested Prescriptions   Signed Prescriptions Disp Refills    escitalopram (LEXAPRO) 10 MG tablet 135 tablet 0     Sig: TAKE 1&1/2 TABLETS (15 MG) BY MOUTH DAILY       SSRIs Protocol Passed - 1/2/2020  7:26 AM        Passed - Recent (12 mo) or future (30 days) visit within the authorizing provider's specialty     Patient has had an office visit with the authorizing provider or a provider within the authorizing providers department within the previous 12 mos or has a future within next 30 days. See \"Patient Info\" tab in inbasket, or \"Choose Columns\" in Meds & Orders section of the refill encounter.              Passed - Medication is active on med list        Passed - Patient is age 18 or older        Passed - No active pregnancy on record        Passed - No positive pregnancy test in last 12 months        Nataly Tolentino RN  [facility]    "

## 2020-01-18 ENCOUNTER — TRANSFERRED RECORDS (OUTPATIENT)
Dept: HEALTH INFORMATION MANAGEMENT | Facility: CLINIC | Age: 85
End: 2020-01-18

## 2020-01-30 ENCOUNTER — ANTICOAGULATION THERAPY VISIT (OUTPATIENT)
Dept: NURSING | Facility: CLINIC | Age: 85
End: 2020-01-30
Payer: COMMERCIAL

## 2020-01-30 DIAGNOSIS — Z79.01 LONG TERM CURRENT USE OF ANTICOAGULANT THERAPY: ICD-10-CM

## 2020-01-30 DIAGNOSIS — I48.0 PAROXYSMAL ATRIAL FIBRILLATION (H): ICD-10-CM

## 2020-01-30 LAB — INR POINT OF CARE: 2.2 (ref 0.86–1.14)

## 2020-01-30 PROCEDURE — 99207 ZZC NO CHARGE NURSE ONLY: CPT

## 2020-01-30 PROCEDURE — 85610 PROTHROMBIN TIME: CPT | Mod: QW

## 2020-01-30 PROCEDURE — 36416 COLLJ CAPILLARY BLOOD SPEC: CPT

## 2020-01-30 NOTE — PROGRESS NOTES
ANTICOAGULATION FOLLOW-UP CLINIC VISIT    Patient Name:  Shannan Magdaleno  Date:  2020  Contact Type:  Face to Face    SUBJECTIVE:  Patient Findings     Comments:   The patient was assessed for diet, medication, and activity level changes, missed or extra doses, bruising or bleeding, with no problem findings.          Clinical Outcomes     Negatives:   Major bleeding event, Thromboembolic event, Anticoagulation-related hospital admission, Anticoagulation-related ED visit, Anticoagulation-related fatality    Comments:   The patient was assessed for diet, medication, and activity level changes, missed or extra doses, bruising or bleeding, with no problem findings.             OBJECTIVE    INR Protime   Date Value Ref Range Status   2020 2.2 (A) 0.86 - 1.14 Final       ASSESSMENT / PLAN  No question data found.  Anticoagulation Summary  As of 2020    INR goal:   2.0-3.0   TTR:   46.8 % (1 y)   INR used for dosin.2 (2020)   Warfarin maintenance plan:   2.5 mg (5 mg x 0.5) every Mon, Wed, Fri; 5 mg (5 mg x 1) all other days   Full warfarin instructions:   2.5 mg every Mon, Wed, Fri; 5 mg all other days   Weekly warfarin total:   27.5 mg   Plan last modified:   Moon Lobato RN (2019)   Next INR check:   3/5/2020   Priority:   High   Target end date:   Indefinite    Indications    Paroxysmal atrial fibrillation (H) [I48.0]  Long-term (current) use of anticoagulants [Z79.01] [Z79.01]             Anticoagulation Episode Summary     INR check location:       Preferred lab:       Send INR reminders to:   ARNOLDO FLEMING    Comments:   Radha Palma sets up meds in a med box with alarm reminder. Warfarin is set up in the AM meds. Pt does know which pill is her warfarin but it can be difficult to do same day dose adjustments      Anticoagulation Care Providers     Provider Role Specialty Phone number    Adela Morgan MD [facility] Internal Medicine [phone]            See the  Encounter Report to view Anticoagulation Flowsheet and Dosing Calendar (Go to Encounters tab in chart review, and find the Anticoagulation Therapy Visit)        Moon Lobato RN

## 2020-02-12 ENCOUNTER — OFFICE VISIT (OUTPATIENT)
Dept: FAMILY MEDICINE | Facility: CLINIC | Age: 85
End: 2020-02-12
Payer: COMMERCIAL

## 2020-02-12 VITALS
TEMPERATURE: 97.5 F | HEIGHT: 62 IN | HEART RATE: 101 BPM | WEIGHT: 143 LBS | OXYGEN SATURATION: 100 % | DIASTOLIC BLOOD PRESSURE: 62 MMHG | SYSTOLIC BLOOD PRESSURE: 126 MMHG | BODY MASS INDEX: 26.31 KG/M2

## 2020-02-12 DIAGNOSIS — S29.012A STRAIN OF THORACIC BACK REGION: Primary | ICD-10-CM

## 2020-02-12 PROCEDURE — 99213 OFFICE O/P EST LOW 20 MIN: CPT | Performed by: NURSE PRACTITIONER

## 2020-02-12 ASSESSMENT — MIFFLIN-ST. JEOR: SCORE: 1031.89

## 2020-02-13 NOTE — PROGRESS NOTES
"Subjective     Shannan Magdaleno is a 88 year old female who presents to clinic today for the following health issues:    HPI     Fall      Duration: fell 1 1/2 weeks ago    Description (location/character/radiation): noticed a pain that has come on in her back 3 days after fall    Intensity:  moderate    Accompanying signs and symptoms: none    History (similar episodes/previous evaluation): None    Precipitating or alleviating factors: None    Therapies tried and outcome: None     Patient attests to above symptoms. Only has pain first thing in the morning when sitting up in her chair, lasts an hour or so and then better throughout the day.    Reviewed and updated as needed this visit by Provider         Review of Systems   ROS COMP: Constitutional, HEENT, cardiovascular, pulmonary, gi and gu systems are negative, except as otherwise noted.      Objective    /62 (BP Location: Left arm, Patient Position: Chair, Cuff Size: Adult Regular)   Pulse 101   Temp 97.5  F (36.4  C) (Oral)   Ht 1.575 m (5' 2\")   Wt 64.9 kg (143 lb)   LMP  (LMP Unknown)   SpO2 100%   BMI 26.16 kg/m    Body mass index is 26.16 kg/m .  Physical Exam   GENERAL: healthy, alert and no distress  MS: Left parathoracic & trapezius tenderness, no tenderness over thoracic spine, left shoulder nontender with FROM  Skin: No ecchymoses or rash    Diagnostic Test Results:  none         Assessment & Plan     1. Strain of thoracic back region  Recommend heat (heating pad on low to medium only), tylenol as needed, stretching. Follow-up if not improving in 1 month.         See Patient Instructions    No follow-ups on file.    ANDREW Dudley [facility]GERTRUDIS        "

## 2020-02-13 NOTE — PATIENT INSTRUCTIONS
Patient Education     Back Sprain or Strain    Injury to the muscles (strain) or ligaments (sprain) around the spine can be troubling. Injury may occur after a sudden forceful twisting or bending such as in a car accident, after a simple awkward movement, or after lifting something heavy with poor body positioning. In any case, muscle spasm is often present and adds to the pain.  Thankfully, most people feel better in 1 to 2 weeks. Most of the rest feel better in 1 to 2 months. Most people can remain active. Unless you had a forceful or traumatic physical injury such as a car accident or fall, X-rays may not be done for the first assessment of a back sprain or strain. If pain continues and doesn't respond to medical treatment, your healthcare provider may then do X-rays and other tests.  Home care  These guidelines will help you care for your injury at home:    When in bed, try to find a comfortable position. A firm mattress is best. Try lying flat on your back with pillows under your knees. You can also try lying on your side with your knees bent up toward your chest and a pillow between your knees.    Don't sit for long periods. Try not to take long car rides or take other trips that have you sitting for a long time. This puts more stress on the lower back than standing or walking.    During the first 24 to 72 hours after an injury or flare-up, put an ice pack on the painful area for 20 minutes. Then remove it for 20 minutes. Do this for 60 to 90 minutes, or several times a day. This will reduce swelling and pain. Always wrap the ice pack in a thin towel or plastic to protect your skin.    You can start with ice, then switch to heat. Heat from a hot shower, hot bath, or heating pad reduces pain and works well for muscle spasms. Put heat on the painful area for 20 minutes, then remove for 20 minutes. Do this for 60 to 90 minutes, or several times a day. Don't use a heating pad while sleeping. It can burn the  skin.    You can alternate the ice and heat. Talk with your healthcare provider to find out the best treatment or therapy for your back pain.    Therapeutic massage can help relax the back muscles without stretching them.    Be aware of safe lifting methods. Don't lift anything over 15 pounds until all of the pain is gone.  Medicines  Talk with your healthcare provider before using medicines, especially if you have other health problems or are taking other medicines.    You may use over-the-counter medicines such as acetaminophen, ibuprofen, or naproxen to control pain, unless another pain medicine was prescribed. Talk with your healthcare provider before taking any medicines if you have a chronic condition such as diabetes, liver or kidney disease, stomach ulcers, or digestive bleeding, or are taking blood-thinner medicines.    Be careful if you are given prescription medicines, opioids, or medicine for muscle spasm. They can cause drowsiness, and affect your coordination, reflexes, and judgment. Don't drive or operate heavy machinery when taking these types of medicines. Only take pain medicine as prescribed by your healthcare provider.  Follow-up care  Follow up with your healthcare provider, or as advised. You may need physical therapy or more tests if your symptoms get worse.  If you had X-rays, your healthcare provider may be checking for any broken bones, breaks, or fractures. Bruises and sprains can sometimes hurt as much as a fracture. These injuries can take time to heal fully. If your symptoms don t get better or they get worse, talk with your healthcare provider. You may need a repeat X-ray or other tests.  Call 911  Call 911 if any of the following occur:    Trouble breathing    Confused    Very drowsy or trouble awakening    Fainting or loss of consciousness    Rapid or very slow heart rate    Loss of bowel or bladder control  When to seek medical advice  Call your healthcare provider right away if any  of the following occur:    Pain gets worse or spreads to your arms or legs    Weakness or numbness in one or both arms or legs    Numbness in the groin or genital area  Date Last Reviewed: 6/1/2016 2000-2019 [employer]. [address] [place] [id number]. All rights reserved. This information is not intended as a substitute for professional medical care. Always follow your healthcare professional's instructions.

## 2020-03-09 NOTE — PROGRESS NOTES
Subjective     Shannan Magdaleno is a 88 year old female who presents to clinic today for the following health issues:      HPI   Fall - Back in February, she stubbed her toe in the kitchen and fell on the floor with her leg twisted. She was having some pain 3 days after her fall. Her pain comes and goes. Only has pain first thing in the morning when she is sitting up in her chair, which lasts about an hour or so and then gets better throughout the day with walking etc. It has been getting better since her follow-up on her fall. She isn't taking anything for pain. The pain isn't bad just noticeable to be bothersome. She wonders if she broke something when she fell. Denies any back pain from her fall.    High Pulse - Patient relates that her pulse has been higher than it use to be. It has been fast for a while, in the 90s. There's been no changes to her medications. She sees cardiology regularly. She wore a heart monitor for testing and it noted no significant changes. She doesn't feel her heart racing, staying same. Hasn't been using albuterol. Denies fevers chills or night sweats.    Anxiety/Depression - Patient's daughter states that she is feeling more down than anxious. Daughter mentions that there has been some changes in her mood. Her younger sister passed away in September and relates that she was really close to her. Her son-in-law has cancer. She is on lexapro. She thinks she is feeling too tired from the medication. She gets 8 hours of sleep regularly. She naps in her chair in the daytime. Daughter states that she has attachments to things and cleaning out her things can cause distress.    Patient notes occasional loose stools in the morning.    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)     Restless leg syndrome     Hyperlipidemia LDL goal <70     Paroxysmal atrial fibrillation (H)     Tremor     CAD  (coronary artery disease)     Retinal dot hemorrhage or microaneuysm, os     Osteopenia     Insomnia     OA (osteoarthritis) of knee     Retroperitoneal lymphadenopathy     Spinal stenosis of lumbar region without neurogenic claudication     Lymphoma, small lymphocytic (H)     Long-term (current) use of anticoagulants [Z79.01]     Posterior vitreous detachment, right     Glaucoma suspect, bilateral     Driving safety issue     Pneumonia of left lower lobe due to infectious organism (H)     Anemia, unspecified type     Combined forms of age-related cataract, mild-mod, both eyes     Past Surgical History:   Procedure Laterality Date     APPENDECTOMY       BREAST BIOPSY, RT/LT  1955    X 2 - benign     C ANESTH,REPAIR LO ABD HERNIA NOS  1995     CARDIAC CATHERIZATION  1/99 , 12/04    PTCA - Stent X 1     HC REMOVAL GALLBLADDER         Social History     Tobacco Use     Smoking status: Never Smoker     Smokeless tobacco: Never Used   Substance Use Topics     Alcohol use: No     Alcohol/week: 0.0 standard drinks     Family History   Problem Relation Age of Onset     Respiratory Mother      Heart Disease Father      Arthritis Sister      Obesity Sister      Heart Disease Sister      Hypertension Sister      Heart Disease Son      Neurologic Disorder Son         migraines     Arthritis Sister      Heart Disease Daughter      Heart Disease Daughter      Macular Degeneration Daughter      Neurologic Disorder Daughter         migraines     Neurologic Disorder Daughter         migraines     Cancer Paternal Aunt      Macular Degeneration Daughter      Diabetes No family hx of      Cerebrovascular Disease No family hx of      Thyroid Disease No family hx of      Glaucoma No family hx of          Current Outpatient Medications   Medication Sig Dispense Refill     acetaminophen (TYLENOL) 500 MG tablet Take 1-2 tablets by mouth every 6 hours as needed.       ASPIRIN NOT PRESCRIBED (INTENTIONAL) Please choose reason not prescribed,  below 0 each 0     buPROPion (WELLBUTRIN SR) 100 MG 12 hr tablet Take 1 tablet (100 mg) by mouth daily 30 tablet 3     calcium-vitamin D (CALTRATE) 600-400 MG-UNIT per tablet Take 1 tablet by mouth daily       escitalopram (LEXAPRO) 10 MG tablet Take 1 tablet (10 mg) by mouth daily TAKE 1&1/2 TABLETS (15 MG) BY MOUTH DAILY 90 tablet 4     metoprolol tartrate (LOPRESSOR) 50 MG tablet Take 1 tablet (50 mg) by mouth 2 times daily       Multiple Vitamins-Minerals (CENTRUM SILVER) per tablet Take 1 tablet by mouth daily Has 2000 IU of Vitamin D in it. 30 tablet      nitroGLYcerin (NITROSTAT) 0.4 MG sublingual tablet Place 1 tablet (0.4 mg) under the tongue every 5 minutes as needed for chest pain 25 tablet 3     simvastatin (ZOCOR) 40 MG tablet Take 1 tablet (40 mg) by mouth daily 90 tablet 3     warfarin (COUMADIN) 5 MG tablet Take 2.5 mg on Friday and 5 mg on all other days 90 tablet 3     Allergies   Allergen Reactions     Codeine Nausea and Vomiting     Codeine      DOESN'T FEEL WELL     Latex Itching     Lisinopril      cough     Tamiflu [Oseltamivir] GI Disturbance     Diarrhea and vomiting (would try Relenza)     Atorvastatin Calcium      Myalgias       Sulfa Drugs Nausea and Vomiting     DOES'NT FEEL WELL     Recent Labs   Lab Test 03/05/18  1407 09/22/17  1252 09/09/16  0931  04/01/15  1535 05/21/14  0859 06/19/12  0845   LDL  --   --  53  --  59 57 68   HDL  --   --  60  --   --  67 75   TRIG  --   --  61  --   --  79 66   ALT  --  33 28  --  30 20 11   CR 0.74 0.73 0.73   < > 0.66 0.76 0.68   GFRESTIMATED 74 76 75  --  86 73 83   GFRESTBLACK 90 >90 >90   GFR Calc    --  >90   GFR Calc   88 >90   POTASSIUM 4.2 4.1 4.0   < > 4.1 3.7 4.7   TSH  --   --  1.18  --  1.13 1.16 1.10    < > = values in this interval not displayed.        Reviewed and updated as needed this visit by Provider  Tobacco  Allergies  Meds  Problems  Med Hx  Surg Hx  Fam Hx       Review of Systems   ROS  "COMP: Constitutional, HEENT, cardiovascular, pulmonary, GI, , musculoskeletal, neuro, skin, endocrine and psych systems are negative, except as otherwise noted.    This document serves as a record of the services and decisions personally performed and made by Adela Morgan MD. It was created on her behalf by Jeannine Harris, a trained medical scribe. The creation of this document is based on the provider's statements to the medical scribe.  Jeannine Harris 10:21 AM March 10, 2020      Objective    /72 (BP Location: Left arm, Cuff Size: Adult Regular)   Pulse 90   Temp 97.4  F (36.3  C) (Oral)   Resp 18   Ht 1.575 m (5' 2\")   Wt 63 kg (138 lb 14.4 oz)   LMP  (LMP Unknown)   SpO2 99%   BMI 25.41 kg/m    Body mass index is 25.41 kg/m .  Physical Exam   GENERAL: healthy, alert and no distress  NECK: no adenopathy, no asymmetry, masses, or scars and thyroid normal to palpation  RESP: lungs clear to auscultation - no rales, rhonchi or wheezes  CV: regular rate and rhythm, normal S1 S2, no S3 or S4, no murmur, click or rub, no peripheral edema and peripheral pulses strong  ABDOMEN: soft, nontender, no hepatosplenomegaly, questionable mass or splenomegaly in the left upper abdomen  and bowel sounds normal  MS: no gross musculoskeletal defects noted, no edema  BACK: no CVA tenderness, no paralumbar tenderness  PSYCH: mentation appears normal, affect normal/bright      Diagnostic Test Results:  Labs reviewed in Epic  Results for orders placed or performed in visit on 03/10/20 (from the past 24 hour(s))   INR point of care   Result Value Ref Range    INR Point of Care 2.2 (H) 0.86 - 1.14   CBC with platelets differential   Result Value Ref Range    WBC 10.3 4.0 - 11.0 10e9/L    RBC Count 4.15 3.8 - 5.2 10e12/L    Hemoglobin 12.9 11.7 - 15.7 g/dL    Hematocrit 39.9 35.0 - 47.0 %    MCV 96 78 - 100 fl    MCH 31.1 26.5 - 33.0 pg    MCHC 32.3 31.5 - 36.5 g/dL    RDW 13.9 10.0 - 15.0 %    Platelet Count 212 150 - 450 10e9/L    % " Neutrophils 40.1 %    % Lymphocytes 45.2 %    % Monocytes 10.9 %    % Eosinophils 3.5 %    % Basophils 0.3 %    Absolute Neutrophil 4.1 1.6 - 8.3 10e9/L    Absolute Lymphocytes 4.7 0.8 - 5.3 10e9/L    Absolute Monocytes 1.1 0.0 - 1.3 10e9/L    Absolute Eosinophils 0.4 0.0 - 0.7 10e9/L    Absolute Basophils 0.0 0.0 - 0.2 10e9/L    Diff Method Automated Method            Assessment & Plan     1. Recurrent major depressive disorder, in partial remission (H)  Advised patient to start wellbutrin-per patient instructions.  Prescription placed today. Will continue to monitor.  - buPROPion (WELLBUTRIN SR) 100 MG 12 hr tablet; Take 1 tablet (100 mg) by mouth daily  Dispense: 30 tablet; Refill: 3    2. Lymphoma, small lymphocytic (H)  Sees oncology for follow-up.  CBC and CMP completed today.  - CBC with platelets differential  - Comprehensive metabolic panel    3. Paroxysmal atrial fibrillation (H)  Sees cardiology for follow-up.  INR completed today. Will continue to monitor.  - INR point of care  - Comprehensive metabolic panel  - TSH with free T4 reflex    4. Anxiety  As above. Refills placed today.  - escitalopram (LEXAPRO) 10 MG tablet; Take 1 tablet (10 mg) by mouth daily TAKE 1&1/2 TABLETS (15 MG) BY MOUTH DAILY  Dispense: 90 tablet; Refill: 4    5. Hyperlipidemia LDL goal <70  Stable. Will continue to monitor.    6. Coronary artery disease involving native coronary artery of native heart without angina pectoris  Managed with current measures.  Lipid panel completed today. Will continue to monitor.  - Lipid panel reflex to direct LDL Non-fasting    7. Left flank pain  Patient continues to have left flank pain since her fall in Feb.  Abdomen exam is abnormal   Advised patient to schedule abdominal ultrasound.  Orders placed today. CMP completed today.  - US Abdomen Complete; Future  - Comprehensive metabolic panel    8. Loose stools  Will continue to monitor.  Not related to medication otherwise it would be all the  time.          Patient Instructions   - Change Lexpro to one tab daily and add on Wellbutrin one tab in the morning.  This will help with depression and focus  - Schedule abdominal ultrasound.  - Lidocaine patch over the counter for up to 12 hours each day for the left sided pain.    The information in this document, created by the medical scribe for me, accurately reflects the services I personally performed and the decisions made by me. I have reviewed and approved this document for accuracy prior to leaving the patient care area.  March 10, 2020 10:21 AM    I spent 23 minutes of time with the patient and >50% of it was in education and counseling regarding health maintenance and medication recheck.  In: 10:19 AM  Out: 10:42 AM    Adela Morgan MD  -[facility]

## 2020-03-10 ENCOUNTER — TELEPHONE (OUTPATIENT)
Dept: FAMILY MEDICINE | Facility: CLINIC | Age: 85
End: 2020-03-10

## 2020-03-10 ENCOUNTER — OFFICE VISIT (OUTPATIENT)
Dept: INTERNAL MEDICINE | Facility: CLINIC | Age: 85
End: 2020-03-10
Payer: COMMERCIAL

## 2020-03-10 ENCOUNTER — MYC MEDICAL ADVICE (OUTPATIENT)
Dept: INTERNAL MEDICINE | Facility: CLINIC | Age: 85
End: 2020-03-10

## 2020-03-10 VITALS
WEIGHT: 138.9 LBS | RESPIRATION RATE: 18 BRPM | BODY MASS INDEX: 25.56 KG/M2 | DIASTOLIC BLOOD PRESSURE: 72 MMHG | HEART RATE: 90 BPM | HEIGHT: 62 IN | TEMPERATURE: 97.4 F | OXYGEN SATURATION: 99 % | SYSTOLIC BLOOD PRESSURE: 130 MMHG

## 2020-03-10 DIAGNOSIS — R19.5 LOOSE STOOLS: ICD-10-CM

## 2020-03-10 DIAGNOSIS — I25.10 CORONARY ARTERY DISEASE INVOLVING NATIVE CORONARY ARTERY OF NATIVE HEART WITHOUT ANGINA PECTORIS: ICD-10-CM

## 2020-03-10 DIAGNOSIS — F41.9 ANXIETY: ICD-10-CM

## 2020-03-10 DIAGNOSIS — R10.9 LEFT FLANK PAIN: ICD-10-CM

## 2020-03-10 DIAGNOSIS — E78.5 HYPERLIPIDEMIA LDL GOAL <70: ICD-10-CM

## 2020-03-10 DIAGNOSIS — I48.0 PAROXYSMAL ATRIAL FIBRILLATION (H): Primary | ICD-10-CM

## 2020-03-10 DIAGNOSIS — F33.41 RECURRENT MAJOR DEPRESSIVE DISORDER, IN PARTIAL REMISSION (H): Primary | ICD-10-CM

## 2020-03-10 DIAGNOSIS — C83.00 LYMPHOMA, SMALL LYMPHOCYTIC (H): ICD-10-CM

## 2020-03-10 DIAGNOSIS — I48.0 PAROXYSMAL ATRIAL FIBRILLATION (H): ICD-10-CM

## 2020-03-10 LAB
ALBUMIN SERPL-MCNC: 3.5 G/DL (ref 3.4–5)
ALP SERPL-CCNC: 62 U/L (ref 40–150)
ALT SERPL W P-5'-P-CCNC: 17 U/L (ref 0–50)
ANION GAP SERPL CALCULATED.3IONS-SCNC: 6 MMOL/L (ref 3–14)
AST SERPL W P-5'-P-CCNC: 30 U/L (ref 0–45)
BASOPHILS # BLD AUTO: 0 10E9/L (ref 0–0.2)
BASOPHILS NFR BLD AUTO: 0.3 %
BILIRUB SERPL-MCNC: 0.7 MG/DL (ref 0.2–1.3)
BUN SERPL-MCNC: 16 MG/DL (ref 7–30)
CALCIUM SERPL-MCNC: 9.1 MG/DL (ref 8.5–10.1)
CHLORIDE SERPL-SCNC: 110 MMOL/L (ref 94–109)
CHOLEST SERPL-MCNC: 103 MG/DL
CO2 SERPL-SCNC: 24 MMOL/L (ref 20–32)
CREAT SERPL-MCNC: 0.77 MG/DL (ref 0.52–1.04)
DIFFERENTIAL METHOD BLD: NORMAL
EOSINOPHIL # BLD AUTO: 0.4 10E9/L (ref 0–0.7)
EOSINOPHIL NFR BLD AUTO: 3.5 %
ERYTHROCYTE [DISTWIDTH] IN BLOOD BY AUTOMATED COUNT: 13.9 % (ref 10–15)
GFR SERPL CREATININE-BSD FRML MDRD: 69 ML/MIN/{1.73_M2}
GLUCOSE SERPL-MCNC: 85 MG/DL (ref 70–99)
HCT VFR BLD AUTO: 39.9 % (ref 35–47)
HDLC SERPL-MCNC: 51 MG/DL
HGB BLD-MCNC: 12.9 G/DL (ref 11.7–15.7)
INR BLD: 2.2 (ref 0.86–1.14)
LDLC SERPL CALC-MCNC: 38 MG/DL
LYMPHOCYTES # BLD AUTO: 4.7 10E9/L (ref 0.8–5.3)
LYMPHOCYTES NFR BLD AUTO: 45.2 %
MCH RBC QN AUTO: 31.1 PG (ref 26.5–33)
MCHC RBC AUTO-ENTMCNC: 32.3 G/DL (ref 31.5–36.5)
MCV RBC AUTO: 96 FL (ref 78–100)
MONOCYTES # BLD AUTO: 1.1 10E9/L (ref 0–1.3)
MONOCYTES NFR BLD AUTO: 10.9 %
NEUTROPHILS # BLD AUTO: 4.1 10E9/L (ref 1.6–8.3)
NEUTROPHILS NFR BLD AUTO: 40.1 %
NONHDLC SERPL-MCNC: 52 MG/DL
PLATELET # BLD AUTO: 212 10E9/L (ref 150–450)
POTASSIUM SERPL-SCNC: 4.4 MMOL/L (ref 3.4–5.3)
PROT SERPL-MCNC: 7.1 G/DL (ref 6.8–8.8)
RBC # BLD AUTO: 4.15 10E12/L (ref 3.8–5.2)
SODIUM SERPL-SCNC: 140 MMOL/L (ref 133–144)
TRIGL SERPL-MCNC: 72 MG/DL
TSH SERPL DL<=0.005 MIU/L-ACNC: 1.68 MU/L (ref 0.4–4)
WBC # BLD AUTO: 10.3 10E9/L (ref 4–11)

## 2020-03-10 PROCEDURE — 80053 COMPREHEN METABOLIC PANEL: CPT | Performed by: INTERNAL MEDICINE

## 2020-03-10 PROCEDURE — 80061 LIPID PANEL: CPT | Performed by: INTERNAL MEDICINE

## 2020-03-10 PROCEDURE — 85610 PROTHROMBIN TIME: CPT | Mod: QW | Performed by: INTERNAL MEDICINE

## 2020-03-10 PROCEDURE — 84443 ASSAY THYROID STIM HORMONE: CPT | Performed by: INTERNAL MEDICINE

## 2020-03-10 PROCEDURE — 99214 OFFICE O/P EST MOD 30 MIN: CPT | Performed by: INTERNAL MEDICINE

## 2020-03-10 PROCEDURE — 85025 COMPLETE CBC W/AUTO DIFF WBC: CPT | Performed by: INTERNAL MEDICINE

## 2020-03-10 PROCEDURE — 36415 COLL VENOUS BLD VENIPUNCTURE: CPT | Performed by: INTERNAL MEDICINE

## 2020-03-10 RX ORDER — BUPROPION HYDROCHLORIDE 100 MG/1
100 TABLET, EXTENDED RELEASE ORAL DAILY
Qty: 30 TABLET | Refills: 3 | Status: SHIPPED | OUTPATIENT
Start: 2020-03-10 | End: 2020-04-01

## 2020-03-10 RX ORDER — ESCITALOPRAM OXALATE 10 MG/1
10 TABLET ORAL DAILY
Qty: 90 TABLET | Refills: 4 | Status: SHIPPED | OUTPATIENT
Start: 2020-03-10 | End: 2021-03-12

## 2020-03-10 ASSESSMENT — MIFFLIN-ST. JEOR: SCORE: 1013.3

## 2020-03-10 ASSESSMENT — PATIENT HEALTH QUESTIONNAIRE - PHQ9: SUM OF ALL RESPONSES TO PHQ QUESTIONS 1-9: 10

## 2020-03-10 ASSESSMENT — PAIN SCALES - GENERAL: PAINLEVEL: NO PAIN (0)

## 2020-03-10 NOTE — PATIENT INSTRUCTIONS
- Change Lexpro to one tab daily and add on Wellbutrin one tab in the morning.  This will help with depression and focus  - Schedule abdominal ultrasound.  - Lidocaine patch over the counter for up to 12 hours each day for the left sided pain.

## 2020-03-10 NOTE — RESULT ENCOUNTER NOTE
Normal thyroid. Normal electrolytes. Normal kidney function. Normal liver blood test. Good cholesterol. Normal blood count.

## 2020-03-10 NOTE — TELEPHONE ENCOUNTER
ANTICOAGULATION MANAGEMENT     Patient Name:  Shannan Magdaleno  Date:  3/10/2020    ASSESSMENT /SUBJECTIVE:      Today's INR result of 2.2 is therapeutic. Goal INR of 2.0-3.0      Warfarin dose taken: Warfarin taken as previously instructed    Diet: No new diet changes affecting INR    Medication changes/ interactions: No interaction expected between Wellbutrin and warfarin    Previous INR: Therapeutic     S/S of bleeding or thromboembolism: No    New injury or illness:  No    Upcoming surgery, procedure or cardioversion:  No    Additional findings: Lexapro dose decreased by 5 mg.       PLAN:    Spoke with Minerva regarding INR result and instructed:     Warfarin Dosing Instructions: Continue your current warfarin dose    Instructed patient to follow up no later than: 5 weeks  Patient offered & declined to schedule next visit   She has another office visit.     Education provided: No      Minerva verbalizes understanding and agrees to warfarin dosing plan.    Instructed to call the Anticoagulation Clinic for any changes, questions or concerns. (#[phone])        OBJECTIVE:  INR Point of Care   Date Value Ref Range Status   03/10/2020 2.2 (H) 0.86 - 1.14 Final     Comment:     This test is intended for monitoring Coumadin therapy.  Results are not   accurate in patients with prolonged INR due to factor deficiency.               Anticoagulation Summary  As of 3/10/2020    INR goal:   2.0-3.0   TTR:   50.2 % (1 y)   INR used for dosin.2 (3/10/2020)   Warfarin maintenance plan:   2.5 mg (5 mg x 0.5) every Mon, Wed, Fri; 5 mg (5 mg x 1) all other days   Full warfarin instructions:   2.5 mg every Mon, Wed, Fri; 5 mg all other days   Weekly warfarin total:   27.5 mg   Plan last modified:   Moon Lobato RN (2019)   Next INR check:   2020   Priority:   High   Target end date:   Indefinite    Indications    Paroxysmal atrial fibrillation (H) [I48.0]  Long-term (current) use of anticoagulants [Z79.01]  [Z79.01]             Anticoagulation Episode Summary     INR check location:       Preferred lab:       Send INR reminders to:   ARNOLDO FLEMING    Comments:   Daughter Minerva sets up meds in a med box with alarm reminder. Warfarin is set up in the AM meds. Pt does know which pill is her warfarin but it can be difficult to do same day dose adjustments      Anticoagulation Care Providers     Provider Role Specialty Phone number    Adela Morgan MD [facility] Internal Medicine [phone]

## 2020-03-11 NOTE — TELEPHONE ENCOUNTER
Consent to communicate with Minerva, daughter dated 3/13/18 is under media tab. Thanks.    Nataly Tolentino RN  [facility]

## 2020-04-01 ENCOUNTER — MYC MEDICAL ADVICE (OUTPATIENT)
Dept: INTERNAL MEDICINE | Facility: CLINIC | Age: 85
End: 2020-04-01

## 2020-04-01 DIAGNOSIS — F33.41 RECURRENT MAJOR DEPRESSIVE DISORDER, IN PARTIAL REMISSION (H): ICD-10-CM

## 2020-04-01 RX ORDER — BUPROPION HYDROCHLORIDE 100 MG/1
100 TABLET, EXTENDED RELEASE ORAL DAILY
Qty: 90 TABLET | Refills: 3 | Status: SHIPPED | OUTPATIENT
Start: 2020-04-01 | End: 2020-04-14

## 2020-04-14 ENCOUNTER — VIRTUAL VISIT (OUTPATIENT)
Dept: INTERNAL MEDICINE | Facility: CLINIC | Age: 85
End: 2020-04-14
Payer: COMMERCIAL

## 2020-04-14 DIAGNOSIS — F41.9 ANXIETY: Primary | ICD-10-CM

## 2020-04-14 DIAGNOSIS — R44.3 HALLUCINATIONS: ICD-10-CM

## 2020-04-14 DIAGNOSIS — F33.41 RECURRENT MAJOR DEPRESSIVE DISORDER, IN PARTIAL REMISSION (H): ICD-10-CM

## 2020-04-14 PROCEDURE — 99213 OFFICE O/P EST LOW 20 MIN: CPT | Mod: 95 | Performed by: INTERNAL MEDICINE

## 2020-04-14 NOTE — PROGRESS NOTES
"Shannan Magdaleno is a 88 year old female who is being evaluated via a billable telephone visit.      The patient has been notified of following:   \"This telephone visit will be conducted via a call between you and your physician/provider. We have found that certain health care needs can be provided without the need for a physical exam. This service lets us provide the care you need with a short phone conversation. If a prescription is necessary we can send it directly to your pharmacy. If lab work is needed we can place an order for that and you can then stop by our lab to have the test done at a later time.  Telephone visits are billed at different rates depending on your insurance coverage. During this emergency period, for some insurers they may be billed the same as an in-person visit.  Please reach out to your insurance provider with any questions.  If during the course of the call the physician/provider feels a telephone visit is not appropriate, you will not be charged for this service.\"    Patient has given verbal consent for Telephone visit?  Yes    How would you like to obtain your AVS? Doug Bustos     Shannan Magdaleno is a 88 year old female who presents to clinic today for the following health issues:  Patient feels she stopped taking one of her medications for depression a few days ago.  She isn't sure which one but states her daughter took it away.  The patient feels better now since she had some worsening depression due to situational issues with death.  She is still on the lexapro, she thinks.  She thinks she has been off wellbutrin for a week.      The patient denies abdominal or flank pain, anorexia, nausea or vomiting, dysphagia, change in bowel habits or black or bloody stools or weight loss.  She didn't do the ultrasound as requested.        she has had times where she feels someone is in the house but then her  says no one is there.              Reviewed and updated as needed " this visit by Provider  Tobacco  Allergies  Meds  Problems  Med Hx  Surg Hx  Fam Hx         Review of Systems    ROS: 4 point ROS neg other than the symptoms noted above in the HPI.         Objective   Reported vitals:  LMP  (LMP Unknown)    healthy, alert and no distress  PSYCH: Alert and oriented times 3; coherent speech, normal   rate and volume, able to articulate logical thoughts, able   to abstract reason, no tangential thoughts, no hallucinations   or delusions  Her affect is normal  RESP: No cough, no audible wheezing, able to talk in full sentences  Remainder of exam unable to be completed due to telephone visits    Diagnostic Test Results:  none         Assessment/Plan:  1. Anxiety  She feels she is doing fine with just the lexpro.  She is off the wellbutrin for 1 week due to hallucinations that were worsening, although she does have these chronically to some extent.      2. Recurrent major depressive disorder, in partial remission (H)      3. Hallucinations  Improved without the bupropion.  Will remain off.    No follow-ups on file.      Phone call duration:  8 minutes  Start 10:21 AM  Stop 10:29 AM     Adela Morgan MD

## 2020-04-16 ENCOUNTER — TELEPHONE (OUTPATIENT)
Dept: FAMILY MEDICINE | Facility: CLINIC | Age: 85
End: 2020-04-16

## 2020-04-16 NOTE — TELEPHONE ENCOUNTER
Shannan Magdaleno is overdue for INR check.      First reminder letter sent     Moon Lobato RN - [facility]

## 2020-04-16 NOTE — LETTER
April 16, 2020      Shannan Magdaleno  [address]  [place] [address]      Dear Shannan,    You are currently under the care of [facility] for your warfarin (Coumadin ) therapy.  We are contacting you because our records show you were due for an INR on 04/10/2020.    There are potentially serious risks when taking warfarin without careful monitoring and we want to make sure you are safely managed.  Routine INR monitoring is required for warfarin refills.     Please call [phone] as soon as possible to schedule an appointment.  If there has been a change in your care or other concerns, please let us know so we can help and or update our records.     Sincerely,       [facility]

## 2020-04-22 ENCOUNTER — TRANSFERRED RECORDS (OUTPATIENT)
Dept: HEALTH INFORMATION MANAGEMENT | Facility: CLINIC | Age: 85
End: 2020-04-22

## 2020-04-23 ENCOUNTER — TELEPHONE (OUTPATIENT)
Dept: FAMILY MEDICINE | Facility: CLINIC | Age: 85
End: 2020-04-23

## 2020-04-23 DIAGNOSIS — Z79.01 LONG TERM CURRENT USE OF ANTICOAGULANT THERAPY: ICD-10-CM

## 2020-04-23 DIAGNOSIS — I48.0 PAROXYSMAL ATRIAL FIBRILLATION (H): ICD-10-CM

## 2020-04-23 LAB — INR PPP: 2.3 (ref 0.9–1.1)

## 2020-04-23 NOTE — TELEPHONE ENCOUNTER
Anticoagulation Management    Unable to reach Shannan today.    Today's INR result of 2.3 is therapeutic (goal INR of 2.0-3.0).  Result received from: [facility]    Follow up required to confirm warfarin dose taken and assess for changes    LMTCB     Tentative plan: if No changes then continue: 2.5 mg on Mon/Wed/Fri and 5 mg all other days  Recheck in 6 weeks    Anticoagulation clinic to follow up    Liset Min RN

## 2020-04-24 NOTE — TELEPHONE ENCOUNTER
ANTICOAGULATION MANAGEMENT     Patient Name:  Shannan Magdaleno  Date:  2020    ASSESSMENT /SUBJECTIVE:    Today's INR result of 2.3 is therapeutic. Goal INR of 2.0-3.0      Warfarin dose taken: Warfarin taken as previously instructed    Diet: No new diet changes affecting INR    Medication changes/ interactions: No new medications/supplements affecting INR    Previous INR: Therapeutic     S/S of bleeding or thromboembolism: No    New injury or illness: No    Upcoming surgery, procedure or cardioversion: No    Additional findings: None      PLAN:    Spoke with Shannan regarding INR result and instructed:     Warfarin Dosing Instructions: Continue your current warfarin dose 2.5 mg on mwf and 5 mg all the other days    Instructed patient to follow up no later than: 6 weeks  Orders given to [facility] ([phone])    Education provided: None required      Shannan verbalizes understanding and agrees to warfarin dosing plan.    Instructed to call the Anticoagulation Clinic for any changes, questions or concerns. (#[phone])        OBJECTIVE:  INR   Date Value Ref Range Status   2020 2.3 (A) 0.90 - 1.10 Final             Anticoagulation Summary  As of 2020    INR goal:   2.0-3.0   TTR:   59.3 % (1 y)   INR used for dosin.3 (2020)   Warfarin maintenance plan:   2.5 mg (5 mg x 0.5) every Mon, Wed, Fri; 5 mg (5 mg x 1) all other days   Full warfarin instructions:   2.5 mg every Mon, Wed, Fri; 5 mg all other days   Weekly warfarin total:   27.5 mg   Plan last modified:   Moon Lobato RN (2019)   Next INR check:   2020   Priority:   High   Target end date:   Indefinite    Indications    Paroxysmal atrial fibrillation (H) [I48.0]  Long-term (current) use of anticoagulants [Z79.01] [Z79.01]             Anticoagulation Episode Summary     INR check location:       Preferred lab:       Send INR reminders to:   ARNOLDO FLEMING    Comments:   Radha Palma sets up meds in a  med box with alarm reminder. Warfarin is set up in the AM meds. Pt does know which pill is her warfarin but it can be difficult to do same day dose adjustments      Anticoagulation Care Providers     Provider Role Specialty Phone number    Adela Morgan MD [facility] [phone]

## 2020-04-24 NOTE — TELEPHONE ENCOUNTER
Attempted to reach Shannan, patient unavailable per spouse, will try again later.    Liset Min RN

## 2020-06-04 ENCOUNTER — ANTICOAGULATION THERAPY VISIT (OUTPATIENT)
Dept: NURSING | Facility: CLINIC | Age: 85
End: 2020-06-04

## 2020-06-04 DIAGNOSIS — Z79.01 LONG TERM CURRENT USE OF ANTICOAGULANT THERAPY: ICD-10-CM

## 2020-06-04 DIAGNOSIS — I48.0 PAROXYSMAL ATRIAL FIBRILLATION (H): ICD-10-CM

## 2020-06-04 LAB — INR PPP: 2.7 (ref 0.9–1.1)

## 2020-06-04 NOTE — PROGRESS NOTES
ANTICOAGULATION MANAGEMENT     Patient Name:  Shannan Magdaleno  Date:  2020    ASSESSMENT /SUBJECTIVE:    Today's INR result of 2.7 is therapeutic. Goal INR of 2.0-3.0      Warfarin dose taken: Warfarin taken as previously instructed    Diet: No new diet changes affecting INR    Medication changes/ interactions: No new medications/supplements affecting INR    Previous INR: Therapeutic     S/S of bleeding or thromboembolism: No    New injury or illness: No    Upcoming surgery, procedure or cardioversion: No    Additional findings: None      PLAN:    Spoke with Shannan regarding INR result and instructed:     Warfarin Dosing Instructions: Continue your current warfarin dose 2.5 mg MWF; 5 mg ROW    Instructed patient to follow up no later than: 5 weeks  Patient to recheck with home meter    Education provided: None required      Shannan verbalizes understanding and agrees to warfarin dosing plan.    Instructed to call the Anticoagulation Clinic for any changes, questions or concerns. (#[phone])        OBJECTIVE:  INR   Date Value Ref Range Status   2020 2.7 (A) 0.90 - 1.10 Final         No question data found.  Anticoagulation Summary  As of 2020    INR goal:   2.0-3.0   TTR:   65.9 % (1 y)   INR used for dosin.7 (2020)   Warfarin maintenance plan:   2.5 mg (5 mg x 0.5) every Mon, Wed, Fri; 5 mg (5 mg x 1) all other days   Full warfarin instructions:   2.5 mg every Mon, Wed, Fri; 5 mg all other days   Weekly warfarin total:   27.5 mg   No change documented:   Tasha Mcelroy RN   Plan last modified:   Moon Lobato RN (2019)   Next INR check:   2020   Priority:   High   Target end date:   Indefinite    Indications    Paroxysmal atrial fibrillation (H) [I48.0]  Long-term (current) use of anticoagulants [Z79.01] [Z79.01]             Anticoagulation Episode Summary     INR check location:       Preferred lab:       Send INR reminders to:   ARNOLDO FLEMING    Comments:    Daughter Minerva sets up meds in a med box with alarm reminder. Warfarin is set up in the AM meds. Pt does know which pill is her warfarin but it can be difficult to do same day dose adjustments      Anticoagulation Care Providers     Provider Role Specialty Phone number    Adela Morgan MD [facility] [phone]

## 2020-06-28 ENCOUNTER — MYC MEDICAL ADVICE (OUTPATIENT)
Dept: FAMILY MEDICINE | Facility: CLINIC | Age: 85
End: 2020-06-28

## 2020-06-29 NOTE — TELEPHONE ENCOUNTER
Dorothy,  Please see [email or web address] message below and advise.    Nataly Tolentino RN  [facility]

## 2020-06-29 NOTE — PROGRESS NOTES
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)     Restless leg syndrome     Hyperlipidemia LDL goal <70     Paroxysmal atrial fibrillation (H)     Tremor     CAD (coronary artery disease)     Retinal dot hemorrhage or microaneuysm, os     Osteopenia     Insomnia     OA (osteoarthritis) of knee     Retroperitoneal lymphadenopathy     Spinal stenosis of lumbar region without neurogenic claudication     Lymphoma, small lymphocytic (H)     Long-term (current) use of anticoagulants [Z79.01]     Posterior vitreous detachment, right     Glaucoma suspect, bilateral     Driving safety issue     Pneumonia of left lower lobe due to infectious organism     Anemia, unspecified type     Combined forms of age-related cataract, mild-mod, both eyes     Past Surgical History:   Procedure Laterality Date     APPENDECTOMY       BREAST BIOPSY, RT/LT  1955    X 2 - benign     C ANESTH,REPAIR LO ABD HERNIA NOS  1995     CARDIAC CATHERIZATION  1/99 , 12/04    PTCA - Stent X 1     HC REMOVAL GALLBLADDER         Social History     Tobacco Use     Smoking status: Never Smoker     Smokeless tobacco: Never Used   Substance Use Topics     Alcohol use: No     Alcohol/week: 0.0 standard drinks     Family History   Problem Relation Age of Onset     Respiratory Mother      Heart Disease Father      Arthritis Sister      Obesity Sister      Heart Disease Sister      Hypertension Sister       Heart Disease Son      Neurologic Disorder Son         migraines     Arthritis Sister      Heart Disease Daughter      Heart Disease Daughter      Macular Degeneration Daughter      Neurologic Disorder Daughter         migraines     Neurologic Disorder Daughter         migraines     Cancer Paternal Aunt      Macular Degeneration Daughter      Diabetes No family hx of      Cerebrovascular Disease No family hx of      Thyroid Disease No family hx of      Glaucoma No family hx of          Current Outpatient Medications   Medication Sig Dispense Refill     ASPIRIN NOT PRESCRIBED (INTENTIONAL) Please choose reason not prescribed, below 0 each 0     calcium-vitamin D (CALTRATE) 600-400 MG-UNIT per tablet Take 1 tablet by mouth daily       ciprofloxacin (CIPRO) 250 MG tablet Take 1 tablet (250 mg) by mouth 2 times daily for 5 days 10 tablet 0     escitalopram (LEXAPRO) 10 MG tablet Take 1 tablet (10 mg) by mouth daily TAKE 1&1/2 TABLETS (15 MG) BY MOUTH DAILY 90 tablet 4     metoprolol tartrate (LOPRESSOR) 50 MG tablet Take 1 tablet (50 mg) by mouth 2 times daily       Multiple Vitamins-Minerals (CENTRUM SILVER) per tablet Take 1 tablet by mouth daily Has 2000 IU of Vitamin D in it. 30 tablet      nitroGLYcerin (NITROSTAT) 0.4 MG sublingual tablet Place 1 tablet (0.4 mg) under the tongue every 5 minutes as needed for chest pain 25 tablet 3     simvastatin (ZOCOR) 40 MG tablet Take 1 tablet (40 mg) by mouth daily 90 tablet 3     warfarin (COUMADIN) 5 MG tablet Take 2.5 mg on Friday and 5 mg on all other days 90 tablet 3     Allergies   Allergen Reactions     Codeine Nausea and Vomiting     Codeine      DOESN'T FEEL WELL     Latex Itching     Lisinopril      cough     Tamiflu [Oseltamivir] GI Disturbance     Diarrhea and vomiting (would try Relenza)     Atorvastatin Calcium      Myalgias       Sulfa Drugs Nausea and Vomiting     DOES'NT FEEL WELL     Recent Labs   Lab Test 03/10/20  1054 03/05/18  1407 09/22/17  1252  09/09/16  0931  04/01/15  1535 05/21/14  0859   LDL 38  --   --  53  --  59 57   HDL 51  --   --  60  --   --  67   TRIG 72  --   --  61  --   --  79   ALT 17  --  33 28  --  30 20   CR 0.77 0.74 0.73 0.73   < > 0.66 0.76   GFRESTIMATED 69 74 76 75  --  86 73   GFRESTBLACK 80 90 >90 >90   GFR Calc    --  >90   GFR Calc   88   POTASSIUM 4.4 4.2 4.1 4.0   < > 4.1 3.7   TSH 1.68  --   --  1.18  --  1.13 1.16    < > = values in this interval not displayed.      BP Readings from Last 3 Encounters:   06/30/20 132/54   03/10/20 130/72   02/12/20 126/62    Wt Readings from Last 3 Encounters:   06/30/20 62.3 kg (137 lb 6.4 oz)   03/10/20 63 kg (138 lb 14.4 oz)   02/12/20 64.9 kg (143 lb)                    Reviewed and updated as needed this visit by Provider  Tobacco  Allergies  Meds  Problems  Med Hx  Surg Hx  Fam Hx         Review of Systems   Constitutional, HEENT, cardiovascular, pulmonary, GI, , musculoskeletal, neuro, skin, endocrine and psych systems are negative, except as otherwise noted.      Objective    /54   Pulse 83   Temp 97.7  F (36.5  C) (Tympanic)   Resp 16   Wt 62.3 kg (137 lb 6.4 oz)   LMP  (LMP Unknown)   SpO2 99%   BMI 25.13 kg/m    Body mass index is 25.13 kg/m .  Physical Exam   GENERAL: healthy, alert and no distress  NECK: no adenopathy  RESP: lungs clear to auscultation - no rales, rhonchi or wheezes  CV: regular rate and rhythm, normal S1 S2, no S3 or S4, no murmur, click or rub, no peripheral edema and peripheral pulses strong  ABDOMEN: soft, nontender, no hepatosplenomegaly, no masses and bowel sounds normal  MS: no gross musculoskeletal defects noted, no edema, no CVA tenderness  SKIN: no suspicious rashes  NEURO: cranial nerves intact  PSYCH: mentation appears normal, affect normal/bright    Diagnostic Test Results:  Labs reviewed in Epic  See orders        Assessment & Plan       ICD-10-CM    1. Acute cystitis with hematuria  N30.01  "ciprofloxacin (CIPRO) 250 MG tablet   2. Urinary symptom or sign  R39.9 UA reflex to Microscopic and Culture     Urine Microscopic   3. Memory changes  R41.3    4. Lymphoma, small lymphocytic (H)  C83.00         BMI:   Estimated body mass index is 25.13 kg/m  as calculated from the following:    Height as of 3/10/20: 1.575 m (5' 2\").    Weight as of this encounter: 62.3 kg (137 lb 6.4 oz).   Weight management plan: Discussed healthy diet and exercise guidelines        See Patient Instructions    Return in about 1 week (around 7/7/2020), or if symptoms worsen or fail to improve.    Pooja Ovalle, NEYMAR  [facility]    "

## 2020-06-30 ENCOUNTER — OFFICE VISIT (OUTPATIENT)
Dept: FAMILY MEDICINE | Facility: CLINIC | Age: 85
End: 2020-06-30
Payer: COMMERCIAL

## 2020-06-30 VITALS
SYSTOLIC BLOOD PRESSURE: 132 MMHG | RESPIRATION RATE: 16 BRPM | WEIGHT: 137.4 LBS | TEMPERATURE: 97.7 F | HEART RATE: 83 BPM | OXYGEN SATURATION: 99 % | BODY MASS INDEX: 25.13 KG/M2 | DIASTOLIC BLOOD PRESSURE: 54 MMHG

## 2020-06-30 DIAGNOSIS — R39.9 URINARY SYMPTOM OR SIGN: ICD-10-CM

## 2020-06-30 DIAGNOSIS — C83.00 LYMPHOMA, SMALL LYMPHOCYTIC (H): ICD-10-CM

## 2020-06-30 DIAGNOSIS — R41.3 MEMORY CHANGES: ICD-10-CM

## 2020-06-30 DIAGNOSIS — N30.01 ACUTE CYSTITIS WITH HEMATURIA: Primary | ICD-10-CM

## 2020-06-30 LAB
ALBUMIN UR-MCNC: ABNORMAL MG/DL
APPEARANCE UR: CLEAR
BACTERIA #/AREA URNS HPF: ABNORMAL /HPF
BILIRUB UR QL STRIP: ABNORMAL
COLOR UR AUTO: YELLOW
GLUCOSE UR STRIP-MCNC: NEGATIVE MG/DL
HGB UR QL STRIP: ABNORMAL
KETONES UR STRIP-MCNC: NEGATIVE MG/DL
LEUKOCYTE ESTERASE UR QL STRIP: ABNORMAL
MUCOUS THREADS #/AREA URNS LPF: PRESENT /LPF
NITRATE UR QL: NEGATIVE
NON-SQ EPI CELLS #/AREA URNS LPF: ABNORMAL /LPF
PH UR STRIP: 5.5 PH (ref 5–7)
RBC #/AREA URNS AUTO: ABNORMAL /HPF
SOURCE: ABNORMAL
SP GR UR STRIP: 1.02 (ref 1–1.03)
UROBILINOGEN UR STRIP-ACNC: 1 EU/DL (ref 0.2–1)
WBC #/AREA URNS AUTO: ABNORMAL /HPF

## 2020-06-30 PROCEDURE — 81001 URINALYSIS AUTO W/SCOPE: CPT | Performed by: NURSE PRACTITIONER

## 2020-06-30 PROCEDURE — 99214 OFFICE O/P EST MOD 30 MIN: CPT | Performed by: NURSE PRACTITIONER

## 2020-06-30 RX ORDER — CIPROFLOXACIN 250 MG/1
250 TABLET, FILM COATED ORAL 2 TIMES DAILY
Qty: 10 TABLET | Refills: 0 | Status: SHIPPED | OUTPATIENT
Start: 2020-06-30 | End: 2020-07-05

## 2020-06-30 NOTE — RESULT ENCOUNTER NOTE
Results discussed directly with patient while patient was present. Any further details documented in the note.   Pooja Ovalle NP

## 2020-06-30 NOTE — PATIENT INSTRUCTIONS
At your visit today, we discussed your risk for falls and preventive options.    Fall Prevention  Falls often occur due to slipping, tripping or losing your balance. Millions of people fall every year and injure themselves. Here are ways to reduce your risk of falling again.    Think about your fall, was there anything that caused your fall that can be fixed, removed, or replaced?    Make your home safe by keeping walkways clear of objects you may trip over, such as electric cords.    Use non-slip pads under rugs. Don't use area rugs or small throw rugs.    Use non-slip mats in bathtubs and showers.    Install handrails and lights on staircases. The handrails should be on both sides of the stairs.    Don't walk in poorly lit areas.    Don't stand on chairs or wobbly ladders.    Use caution when reaching overhead or looking upward. This position can cause a loss of balance.    Be sure your shoes fit properly, have non-slip bottoms and are in good condition.     Wear shoes both inside and out. Don't go barefoot or wear slippers.    Be cautious when going up and down stairs, curbs, and when walking on uneven sidewalks.    If your balance is poor, consider using a cane or walker.    If your fall was related to alcohol use, stop or limit alcohol intake.     If your fall was related to use of sleeping medicines, talk to your healthcare provider about this. You may need to reduce your dosage at bedtime if you awaken during the night to go to the bathroom.      To reduce the need for nighttime bathroom trips:  ? Don't drink fluids for several hours before going to bed  ? Empty your bladder before going to bed  ? Men can keep a urinal at the bedside    Stay as active as you can. Balance, flexibility, strength, and endurance all come from exercise. They all play a role in preventing falls. Ask your healthcare provider which types of activity are right for you.    Get your vision checked on a regular basis.    If you have  "pets, know where they are before you stand up or walk so you don't trip over them.    Use night lights.    Go over all your medicines with a pharmacist or other healthcare provider to see if any of them could make you more likely to fall.  Date Last Reviewed: 4/1/2018 2000-2019 [employer]. [address] [place] [address], [place] [address]. All rights reserved. This information is not intended as a substitute for professional medical care. Always follow your healthcare professional's instructions.        Patient Education     Bladder Infection, Female (Adult)    Urine is normally doesn't have any bacteria in it. But bacteria can get into the urinary tract from the skin around the rectum. Or they can travel in the blood from elsewhere in the body. Once they are in your urinary tract, they can cause infection in the urethra (urethritis), the bladder (cystitis), or the kidneys (pyelonephritis).  The most common place for an infection is in the bladder. This is called a bladder infection. This is one of the most common infections in women. Most bladder infections are easily treated. They are not serious unless the infection spreads to the kidney.  The phrases \"bladder infection,\" \"UTI,\" and \"cystitis\" are often used to describe the same thing. But they are not always the same. Cystitis is an inflammation of the bladder. The most common cause of cystitis is an infection.  Symptoms  The infection causes inflammation in the urethra and bladder. This causes many of the symptoms. The most common symptoms of a bladder infection are:    Pain or burning when urinating    Having to urinate more often than usual    Urgent need to urinate    Only a small amount of urine comes out    Blood in urine    Abdominal discomfort. This is usually in the lower abdomen above the pubic bone.    Cloudy urine    Strong- or bad-smelling urine    Unable to urinate (urinary retention)    Unable to hold urine in (urinary " incontinence)    Fever    Loss of appetite    Confusion (in older adults)  Causes  Bladder infections are not contagious. You can't get one from someone else, from a toilet seat, or from sharing a bath.  The most common cause of bladder infections is bacteria from the bowels. The bacteria get onto the skin around the opening of the urethra. From there, they can get into the urine and travel up to the bladder, causing inflammation and infection. This usually happens because of:    Wiping improperly after urinating. Always wipe from front to back.    Bowel incontinence    Pregnancy    Procedures such as having a catheter inserted    Older age    Not emptying your bladder. This can allow bacteria a chance to grow in your urine.    Dehydration    Constipation    Sex    Use of a diaphragm for birth control   Treatment  Bladder infections are diagnosed by a urine test. They are treated with antibiotics and usually clear up quickly without complications. Treatment helps prevent a more serious kidney infection.  Medicines  Medicines can help in the treatment of a bladder infection:    Take antibiotics until they are used up, even if you feel better. It is important to finish them to make sure the infection has cleared.    You can use acetaminophen or ibuprofen for pain, fever, or discomfort, unless another medicine was prescribed. If you have chronic liver or kidney disease, talk with your healthcare provider before using these medicines. Also talk with your provider if you've ever had a stomach ulcer or gastrointestinal bleeding, or are taking blood-thinner medicines.    If you are given phenazopydridine to reduce burning with urination, it will cause your urine to become a bright orange color. This can stain clothing.  Care and prevention  These self-care steps can help prevent future infections:    Drink plenty of fluids to prevent dehydration and flush out your bladder. Do this unless you must restrict fluids for other  health reasons, or your doctor told you not to.    Proper cleaning after going to the bathroom is important. Wipe from front to back after using the toilet to prevent the spread of bacteria.    Urinate more often. Don't try to hold urine in for a long time.    Wear loose-fitting clothes and cotton underwear. Avoid tight-fitting pants.    Improve your diet and prevent constipation. Eat more fresh fruit and vegetables, and fiber, and less junk and fatty foods.    Avoid sex until your symptoms are gone.    Avoid caffeine, alcohol, and spicy foods. These can irritate your bladder.    Urinate right after intercourse to flush out your bladder.    If you use birth control pills and have frequent bladder infections, discuss it with your doctor.  Follow-up care  Call your healthcare provider if all symptoms are not gone after 3 days of treatment. This is especially important if you have repeat infections.  If a culture was done, you will be told if your treatment needs to be changed. If directed, you can call to find out the results.  If X-rays were done, you will be told if the results will affect your treatment.  Call 911  Call 911 if any of the following occur:    Trouble breathing    Hard to wake up or confusion    Fainting or loss of consciousness    Rapid heart rate  When to seek medical advice  Call your healthcare provider right away if any of these occur:    Fever of 100.4 F (38.0 C) or higher, or as directed by your healthcare provider    Symptoms are not better by the third day of treatment    Back or belly (abdominal) pain that gets worse    Repeated vomiting, or unable to keep medicine down    Weakness or dizziness    Vaginal discharge    Pain, redness, or swelling in the outer vaginal area (labia)  Date Last Reviewed: 10/1/2016    4802-2091 [employer]. [address] [place] [address]. All rights reserved. This information is not intended as a substitute for professional medical care.  Always follow your healthcare professional's instructions.

## 2020-07-01 DIAGNOSIS — I48.0 PAROXYSMAL ATRIAL FIBRILLATION (H): ICD-10-CM

## 2020-07-01 RX ORDER — WARFARIN SODIUM 5 MG/1
TABLET ORAL
Qty: 90 TABLET | Refills: 0 | Status: SHIPPED | OUTPATIENT
Start: 2020-07-01 | End: 2020-09-01

## 2020-07-09 ENCOUNTER — ANTICOAGULATION THERAPY VISIT (OUTPATIENT)
Dept: FAMILY MEDICINE | Facility: CLINIC | Age: 85
End: 2020-07-09

## 2020-07-09 ENCOUNTER — TRANSFERRED RECORDS (OUTPATIENT)
Dept: HEALTH INFORMATION MANAGEMENT | Facility: CLINIC | Age: 85
End: 2020-07-09

## 2020-07-09 DIAGNOSIS — Z79.01 LONG TERM CURRENT USE OF ANTICOAGULANT THERAPY: ICD-10-CM

## 2020-07-09 DIAGNOSIS — I48.0 PAROXYSMAL ATRIAL FIBRILLATION (H): ICD-10-CM

## 2020-07-09 LAB — INR PPP: 2.9 (ref 0.9–1.1)

## 2020-07-09 NOTE — PROGRESS NOTES
ANTICOAGULATION MANAGEMENT     Patient Name:  Shannan Magdaleno  Date:  2020    ASSESSMENT /SUBJECTIVE:    Today's INR result of 2.9 is therapeutic. Goal INR of 2.0-3.0      Warfarin dose taken: Warfarin taken as previously instructed, verbally confirmed dose with daughter Minerva as Shannan is unsure (Minerva sets up med box)    Diet: No new diet changes affecting INR    Medication changes/ interactions: No new medications/supplements affecting INR    Previous INR: Therapeutic     S/S of bleeding or thromboembolism: No    New injury or illness: No    Upcoming surgery, procedure or cardioversion: No    Additional findings: None      PLAN:    Spoke with Shannan regarding INR result and instructed:     Warfarin Dosing Instructions: Continue your current warfarin dose      2.5 mg every Mon, Wed, Fri; 5 mg all other days         Instructed patient to follow up no later than: 6 weeks  Orders given to [facility] ([phone]), spoke to Alok    Education provided: Target INR goal and significance of current INR result      Shannan and Minerva verbalized understanding and agreed to warfarin dosing plan.    Instructed to call the Anticoagulation Clinic for any changes, questions or concerns. (#[phone])        OBJECTIVE:  INR   Date Value Ref Range Status   2020 2.9 (A) 0.90 - 1.10 Final         No question data found.  Anticoagulation Summary  As of 2020    INR goal:   2.0-3.0   TTR:   65.9 % (1 y)   INR used for dosin.9 (2020)   Warfarin maintenance plan:   2.5 mg (5 mg x 0.5) every Mon, Wed, Fri; 5 mg (5 mg x 1) all other days   Full warfarin instructions:   2.5 mg every Mon, Wed, Fri; 5 mg all other days   Weekly warfarin total:   27.5 mg   No change documented:   Xiomy Ulloa, RN   Plan last modified:   Moon Lobato RN (2019)   Next INR check:   2020   Priority:   Maintenance   Target end date:   Indefinite    Indications    Paroxysmal atrial fibrillation (H)  [I48.0]  Long-term (current) use of anticoagulants [Z79.01] [Z79.01]             Anticoagulation Episode Summary     INR check location:       Preferred lab:       Send INR reminders to:   ARNOLDO FLEMING    Comments:   Dtr Minerva sets up meds in a med box with alarm reminder. Warfarin is set up in the AM. Pt does know which pill is her warfarin but it can be difficult to do same day dose adjustments. If you need to confirm correct dose is set up best to speak to Minevra      Anticoagulation Care Providers     Provider Role Specialty Phone number    Adela Morgan MD [facility] Internal Medicine [phone]

## 2020-08-19 ENCOUNTER — MYC MEDICAL ADVICE (OUTPATIENT)
Dept: INTERNAL MEDICINE | Facility: CLINIC | Age: 85
End: 2020-08-19

## 2020-08-20 ENCOUNTER — MYC MEDICAL ADVICE (OUTPATIENT)
Dept: INTERNAL MEDICINE | Facility: CLINIC | Age: 85
End: 2020-08-20

## 2020-08-20 ENCOUNTER — ANTICOAGULATION THERAPY VISIT (OUTPATIENT)
Dept: FAMILY MEDICINE | Facility: CLINIC | Age: 85
End: 2020-08-20

## 2020-08-20 DIAGNOSIS — Z79.01 LONG TERM CURRENT USE OF ANTICOAGULANT THERAPY: ICD-10-CM

## 2020-08-20 DIAGNOSIS — I48.0 PAROXYSMAL ATRIAL FIBRILLATION (H): ICD-10-CM

## 2020-08-20 LAB — INR PPP: 3.7 (ref 0.9–1.1)

## 2020-08-20 NOTE — PROGRESS NOTES
ANTICOAGULATION MANAGEMENT     Patient Name:  Shannan Magdaleno  Date:  8/20/2020    ASSESSMENT /SUBJECTIVE:    Today's INR result of 3.7 is supratherapeutic. Goal INR of 2.0-3.0      Warfarin dose taken: Warfarin taken as previously instructed    Diet: No new diet changes affecting INR    Medication changes/ interactions: No new medications/supplements affecting INR    Previous INR: Therapeutic     S/S of bleeding or thromboembolism: No    New injury or illness: No    Upcoming surgery, procedure or cardioversion: No    Additional findings: None      PLAN:    Spoke with Minerva, daughter, regarding INR result and instructed:     Warfarin Dosing Instructions: hold tomorrow (takes in AM)  then continue your current warfarin dose of 2.5 mg on MWF and 5 mg all other days    Instructed patient to follow up no later than: 2 weeks  Orders given to [facility] ([phone])    Education provided: None required      minerva verbalizes understanding and agrees to warfarin dosing plan.    Instructed to call the Anticoagulation Clinic for any changes, questions or concerns. (#[phone])        Liset Min RN      OBJECTIVE:  Recent labs: (last 7 days)     08/20/20   INR 3.7*         No question data found.  Anticoagulation Summary  As of 8/20/2020    INR goal:   2.0-3.0   TTR:   67.3 % (1 y)   INR used for dosing:   3.7! (8/20/2020)   Warfarin maintenance plan:   2.5 mg (5 mg x 0.5) every Mon, Wed, Fri; 5 mg (5 mg x 1) all other days   Full warfarin instructions:   2.5 mg every Mon, Wed, Fri; 5 mg all other days   Weekly warfarin total:   27.5 mg   Plan last modified:   Moon Lobato RN (12/18/2019)   Next INR check:   9/3/2020   Priority:   Maintenance   Target end date:   Indefinite    Indications    Paroxysmal atrial fibrillation (H) [I48.0]  Long-term (current) use of anticoagulants [Z79.01] [Z79.01]             Anticoagulation Episode Summary     INR check location:       Preferred lab:       Send INR  reminders to:   ARNOLDO FLEMING    Comments:   Dtr Minerva sets up meds in a med box with alarm reminder. Warfarin is set up in the AM. Pt does know which pill is her warfarin but it can be difficult to do same day dose adjustments. If you need to confirm correct dose is set up best to speak to Minerva      Anticoagulation Care Providers     Provider Role Specialty Phone number    Adela Morgan MD [facility] Internal Medicine [phone]

## 2020-08-20 NOTE — TELEPHONE ENCOUNTER
Spoke with Alok at [facility] who reports patient is scheduled to be seen today for INR.  AnTuTu message sent to daughter, Minerva to confirm this is set up.    Liset Min RN

## 2020-08-20 NOTE — TELEPHONE ENCOUNTER
Per last Anticoag clinic notes 7/9/2020- Instructed patient to follow up no later than: 6 weeks  Orders given to [facility] ([phone]), spoke to Alok    INR would be due today  Will route to Anticoag Clinic to advise/address patient's request    Tania Leavitt RN

## 2020-08-31 ASSESSMENT — ACTIVITIES OF DAILY LIVING (ADL)
CURRENT_FUNCTION: PREPARING MEALS REQUIRES ASSISTANCE
CURRENT_FUNCTION: MONEY MANAGEMENT REQUIRES ASSISTANCE
CURRENT_FUNCTION: TRANSPORTATION REQUIRES ASSISTANCE
CURRENT_FUNCTION: SHOPPING REQUIRES ASSISTANCE
CURRENT_FUNCTION: TELEPHONE REQUIRES ASSISTANCE
CURRENT_FUNCTION: MEDICATION ADMINISTRATION REQUIRES ASSISTANCE
CURRENT_FUNCTION: HOUSEWORK REQUIRES ASSISTANCE

## 2020-08-31 ASSESSMENT — ENCOUNTER SYMPTOMS
WEAKNESS: 1
SORE THROAT: 0
FEVER: 0
HEADACHES: 0
ARTHRALGIAS: 0
ABDOMINAL PAIN: 0
HEMATOCHEZIA: 0
FREQUENCY: 0
HEMATURIA: 0
JOINT SWELLING: 0
SHORTNESS OF BREATH: 0
DIARRHEA: 0
CONSTIPATION: 0
DIZZINESS: 1
COUGH: 0
PARESTHESIAS: 0
HEARTBURN: 0
NERVOUS/ANXIOUS: 1
PALPITATIONS: 0
CHILLS: 0
BREAST MASS: 0
NAUSEA: 0
MYALGIAS: 0
EYE PAIN: 0
DYSURIA: 0

## 2020-09-01 ENCOUNTER — ANCILLARY PROCEDURE (OUTPATIENT)
Dept: GENERAL RADIOLOGY | Facility: CLINIC | Age: 85
End: 2020-09-01
Attending: NURSE PRACTITIONER
Payer: COMMERCIAL

## 2020-09-01 ENCOUNTER — OFFICE VISIT (OUTPATIENT)
Dept: FAMILY MEDICINE | Facility: CLINIC | Age: 85
End: 2020-09-01
Payer: COMMERCIAL

## 2020-09-01 VITALS
HEIGHT: 62 IN | HEART RATE: 102 BPM | BODY MASS INDEX: 25.03 KG/M2 | SYSTOLIC BLOOD PRESSURE: 128 MMHG | OXYGEN SATURATION: 98 % | WEIGHT: 136 LBS | TEMPERATURE: 97.3 F | DIASTOLIC BLOOD PRESSURE: 62 MMHG | RESPIRATION RATE: 18 BRPM

## 2020-09-01 DIAGNOSIS — Z23 NEED FOR INFLUENZA VACCINATION: ICD-10-CM

## 2020-09-01 DIAGNOSIS — E78.5 HYPERLIPIDEMIA LDL GOAL <70: ICD-10-CM

## 2020-09-01 DIAGNOSIS — R09.89 ABNORMAL LUNG SOUNDS: ICD-10-CM

## 2020-09-01 DIAGNOSIS — I48.0 PAROXYSMAL ATRIAL FIBRILLATION (H): ICD-10-CM

## 2020-09-01 DIAGNOSIS — R44.3 HALLUCINATIONS: ICD-10-CM

## 2020-09-01 DIAGNOSIS — R41.3 MEMORY LOSS: ICD-10-CM

## 2020-09-01 DIAGNOSIS — Z00.00 ENCOUNTER FOR MEDICARE ANNUAL WELLNESS EXAM: Primary | ICD-10-CM

## 2020-09-01 LAB
ALBUMIN UR-MCNC: NEGATIVE MG/DL
APPEARANCE UR: ABNORMAL
BACTERIA #/AREA URNS HPF: ABNORMAL /HPF
BILIRUB UR QL STRIP: ABNORMAL
COLOR UR AUTO: YELLOW
DIFFERENTIAL METHOD BLD: ABNORMAL
EOSINOPHIL # BLD AUTO: 0.2 10E9/L (ref 0–0.7)
EOSINOPHIL NFR BLD AUTO: 4 %
ERYTHROCYTE [DISTWIDTH] IN BLOOD BY AUTOMATED COUNT: 14.1 % (ref 10–15)
GLUCOSE UR STRIP-MCNC: NEGATIVE MG/DL
HCT VFR BLD AUTO: 41.7 % (ref 35–47)
HGB BLD-MCNC: 13.6 G/DL (ref 11.7–15.7)
HGB UR QL STRIP: NEGATIVE
HYALINE CASTS #/AREA URNS LPF: ABNORMAL /LPF
KETONES UR STRIP-MCNC: ABNORMAL MG/DL
LEUKOCYTE ESTERASE UR QL STRIP: NEGATIVE
LYMPHOCYTES # BLD AUTO: 3.2 10E9/L (ref 0.8–5.3)
LYMPHOCYTES NFR BLD AUTO: 54 %
MCH RBC QN AUTO: 31.7 PG (ref 26.5–33)
MCHC RBC AUTO-ENTMCNC: 32.6 G/DL (ref 31.5–36.5)
MCV RBC AUTO: 97 FL (ref 78–100)
MONOCYTES # BLD AUTO: 0.8 10E9/L (ref 0–1.3)
MONOCYTES NFR BLD AUTO: 14 %
MUCOUS THREADS #/AREA URNS LPF: PRESENT /LPF
NEUTROPHILS # BLD AUTO: 1.7 10E9/L (ref 1.6–8.3)
NEUTROPHILS NFR BLD AUTO: 28 %
NITRATE UR QL: NEGATIVE
NON-SQ EPI CELLS #/AREA URNS LPF: ABNORMAL /LPF
PH UR STRIP: 5 PH (ref 5–7)
PLATELET # BLD AUTO: 115 10E9/L (ref 150–450)
PLATELET # BLD EST: ABNORMAL 10*3/UL
RBC # BLD AUTO: 4.29 10E12/L (ref 3.8–5.2)
RBC #/AREA URNS AUTO: ABNORMAL /HPF
RBC MORPH BLD: NORMAL
SOURCE: ABNORMAL
SP GR UR STRIP: >1.03 (ref 1–1.03)
UROBILINOGEN UR STRIP-ACNC: 0.2 EU/DL (ref 0.2–1)
VARIANT LYMPHS BLD QL SMEAR: PRESENT
WBC # BLD AUTO: 5.9 10E9/L (ref 4–11)
WBC #/AREA URNS AUTO: ABNORMAL /HPF

## 2020-09-01 PROCEDURE — 85025 COMPLETE CBC W/AUTO DIFF WBC: CPT | Performed by: NURSE PRACTITIONER

## 2020-09-01 PROCEDURE — 99214 OFFICE O/P EST MOD 30 MIN: CPT | Mod: 25 | Performed by: NURSE PRACTITIONER

## 2020-09-01 PROCEDURE — 90662 IIV NO PRSV INCREASED AG IM: CPT | Performed by: NURSE PRACTITIONER

## 2020-09-01 PROCEDURE — 80053 COMPREHEN METABOLIC PANEL: CPT | Performed by: NURSE PRACTITIONER

## 2020-09-01 PROCEDURE — 71046 X-RAY EXAM CHEST 2 VIEWS: CPT

## 2020-09-01 PROCEDURE — 99397 PER PM REEVAL EST PAT 65+ YR: CPT | Mod: 25 | Performed by: NURSE PRACTITIONER

## 2020-09-01 PROCEDURE — G0008 ADMIN INFLUENZA VIRUS VAC: HCPCS | Performed by: NURSE PRACTITIONER

## 2020-09-01 PROCEDURE — 82607 VITAMIN B-12: CPT | Performed by: NURSE PRACTITIONER

## 2020-09-01 PROCEDURE — 81001 URINALYSIS AUTO W/SCOPE: CPT | Performed by: NURSE PRACTITIONER

## 2020-09-01 PROCEDURE — 36415 COLL VENOUS BLD VENIPUNCTURE: CPT | Performed by: NURSE PRACTITIONER

## 2020-09-01 RX ORDER — SIMVASTATIN 40 MG
40 TABLET ORAL DAILY
Qty: 90 TABLET | Refills: 3 | Status: SHIPPED | OUTPATIENT
Start: 2020-09-01

## 2020-09-01 RX ORDER — WARFARIN SODIUM 5 MG/1
TABLET ORAL
Qty: 90 TABLET | Refills: 0 | Status: SHIPPED | OUTPATIENT
Start: 2020-09-01 | End: 2020-09-28

## 2020-09-01 RX ORDER — BUPROPION HYDROCHLORIDE 100 MG/1
100 TABLET, EXTENDED RELEASE ORAL 2 TIMES DAILY
COMMUNITY
Start: 2020-07-23 | End: 2021-07-02

## 2020-09-01 ASSESSMENT — ENCOUNTER SYMPTOMS
CHILLS: 0
FREQUENCY: 0
CONSTIPATION: 0
SORE THROAT: 0
NERVOUS/ANXIOUS: 1
MYALGIAS: 0
HEARTBURN: 0
JOINT SWELLING: 0
ARTHRALGIAS: 0
BREAST MASS: 0
DIZZINESS: 1
FEVER: 0
EYE PAIN: 0
COUGH: 0
HEMATURIA: 0
DYSURIA: 0
ABDOMINAL PAIN: 0
WEAKNESS: 1
PARESTHESIAS: 0
PALPITATIONS: 0
DIARRHEA: 0
SHORTNESS OF BREATH: 0
NAUSEA: 0
HEMATOCHEZIA: 0
HEADACHES: 0

## 2020-09-01 ASSESSMENT — ACTIVITIES OF DAILY LIVING (ADL)
CURRENT_FUNCTION: MONEY MANAGEMENT REQUIRES ASSISTANCE
CURRENT_FUNCTION: HOUSEWORK REQUIRES ASSISTANCE
CURRENT_FUNCTION: MEDICATION ADMINISTRATION REQUIRES ASSISTANCE
CURRENT_FUNCTION: SHOPPING REQUIRES ASSISTANCE
CURRENT_FUNCTION: TELEPHONE REQUIRES ASSISTANCE
CURRENT_FUNCTION: TRANSPORTATION REQUIRES ASSISTANCE
CURRENT_FUNCTION: PREPARING MEALS REQUIRES ASSISTANCE

## 2020-09-01 ASSESSMENT — MIFFLIN-ST. JEOR: SCORE: 1000.14

## 2020-09-01 ASSESSMENT — PAIN SCALES - GENERAL: PAINLEVEL: NO PAIN (0)

## 2020-09-01 NOTE — PROGRESS NOTES
"SUBJECTIVE:   Shannan Magdaleno is a 88 year old female who presents for Preventive Visit.    Are you in the first 12 months of your Medicare coverage?  No    Healthy Habits:     In general, how would you rate your overall health?  Fair    Frequency of exercise:  None    Do you usually eat at least 4 servings of fruit and vegetables a day, include whole grains    & fiber and avoid regularly eating high fat or \"junk\" foods?  No    Taking medications regularly:  Yes    Ability to successfully perform activities of daily living:  Telephone requires assistance, transportation requires assistance, shopping requires assistance, preparing meals requires assistance, housework requires assistance, medication administration requires assistance and money management requires assistance    Home Safety:  No safety concerns identified    Hearing Impairment:  Difficulty following a conversation in a noisy restaurant or crowded room, feel that people are mumbling or not speaking clearly, need to ask people to speak up or repeat themselves, difficulty understanding soft or whispered speech and difficulty understanding speech on the telephone    In the past 6 months, have you been bothered by leaking of urine? Yes    In general, how would you rate your overall mental or emotional health?  Fair      PHQ-2 Total Score: 2    Do you feel safe in your environment? Yes    Have you ever done Advance Care Planning? (For example, a Health Directive, POLST, or a discussion with a medical provider or your loved ones about your wishes): Yes, advance care planning is on file.      Fall risk  Fallen 2 or more times in the past year?: Yes  Any fall with injury in the past year?: Yes  Timed Up and Go Test (>13.5 is fall risk; contact physician) : 4    Cognitive Screening unable to do because pt declined    Do you have sleep apnea, excessive snoring or daytime drowsiness?: yes    Reviewed and updated as needed this visit by clinical staff  Tobacco  " Allergies  Meds  Problems  Med Hx  Surg Hx  Fam Hx  Soc Hx          Reviewed and updated as needed this visit by Provider  Tobacco  Allergies  Meds  Problems  Med Hx  Surg Hx  Fam Hx        Social History     Tobacco Use     Smoking status: Never Smoker     Smokeless tobacco: Never Used   Substance Use Topics     Alcohol use: No     Alcohol/week: 0.0 standard drinks     If you drink alcohol do you typically have >3 drinks per day or >7 drinks per week? No    Patient's daughter notes a significant change in memory.  She notes difficulty with short term memory.  She notes a depressed mood related to her memory.  She notes poor balance and has fallen several times.  She does not remember to use assistive devices.  Her daughter notes that she is not always oriented to situation.  She is having auditory and visual hallucinations.  She is up frequently at night and is disturbing her 's sleep.  She is not wandering or getting loss or doing unusual behaviors.  She denies headaches, vision changes.  Her daughter notes that she frequently complains of dizziness.  She notes plugging to her left ear.      Hyperlipidemia Follow-Up      Are you regularly taking any medication or supplement to lower your cholesterol?   Yes- simvastatin    Are you having muscle aches or other side effects that you think could be caused by your cholesterol lowering medication?  No      Current providers sharing in care for this patient include: Patient Care Team:  Adela Morgan MD as PCP - [facility]  Adeal Morgan MD as Assigned PCP    The following health maintenance items are reviewed in Epic and correct as of today:  Health Maintenance   Topic Date Due     SEGUNDO ASSESSMENT  01/25/2018     MEDICARE ANNUAL WELLNESS VISIT  06/03/2020     INFLUENZA VACCINE (1) 09/01/2020     PHQ-9  09/10/2020     CMP  03/10/2021     LIPID  03/10/2021     FALL RISK ASSESSMENT  06/30/2021     DTAP/TDAP/TD IMMUNIZATION  (4 - Td) 08/24/2022     ADVANCE CARE PLANNING  01/18/2024     DEPRESSION ACTION PLAN  Completed     PNEUMOCOCCAL IMMUNIZATION 65+ HIGH/HIGHEST RISK  Completed     IPV IMMUNIZATION  Aged Out     MENINGITIS IMMUNIZATION  Aged Out     HEPATITIS B IMMUNIZATION  Aged Out     ZOSTER IMMUNIZATION  Discontinued     Lab work is in process  BP Readings from Last 3 Encounters:   09/01/20 128/62   06/30/20 132/54   03/10/20 130/72    Wt Readings from Last 3 Encounters:   09/01/20 61.7 kg (136 lb)   06/30/20 62.3 kg (137 lb 6.4 oz)   03/10/20 63 kg (138 lb 14.4 oz)                  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)     Restless leg syndrome     Hyperlipidemia LDL goal <70     Paroxysmal atrial fibrillation (H)     Tremor     CAD (coronary artery disease)     Retinal dot hemorrhage or microaneuysm, os     Osteopenia     Insomnia     OA (osteoarthritis) of knee     Retroperitoneal lymphadenopathy     Spinal stenosis of lumbar region without neurogenic claudication     Lymphoma, small lymphocytic (H)     Long-term (current) use of anticoagulants [Z79.01]     Posterior vitreous detachment, right     Glaucoma suspect, bilateral     Driving safety issue     Pneumonia of left lower lobe due to infectious organism     Anemia, unspecified type     Combined forms of age-related cataract, mild-mod, both eyes     Past Surgical History:   Procedure Laterality Date     APPENDECTOMY       BREAST BIOPSY, RT/LT  1955    X 2 - benign     C ANESTH,REPAIR LO ABD HERNIA NOS  1995     CARDIAC CATHERIZATION  1/99 , 12/04    PTCA - Stent X 1     HC REMOVAL GALLBLADDER         Social History     Tobacco Use     Smoking status: Never Smoker     Smokeless tobacco: Never Used   Substance Use Topics     Alcohol use: No     Alcohol/week: 0.0 standard drinks     Family History   Problem Relation Age of Onset     Respiratory Mother      Heart  Disease Father      Arthritis Sister      Obesity Sister      Heart Disease Sister      Hypertension Sister      Heart Disease Son      Neurologic Disorder Son         migraines     Arthritis Sister      Heart Disease Daughter      Heart Disease Daughter      Macular Degeneration Daughter      Neurologic Disorder Daughter         migraines     Neurologic Disorder Daughter         migraines     Cancer Paternal Aunt      Macular Degeneration Daughter      Diabetes No family hx of      Cerebrovascular Disease No family hx of      Thyroid Disease No family hx of      Glaucoma No family hx of          Current Outpatient Medications   Medication Sig Dispense Refill     ASPIRIN NOT PRESCRIBED (INTENTIONAL) Please choose reason not prescribed, below 0 each 0     buPROPion (WELLBUTRIN SR) 100 MG 12 hr tablet Take 100 mg by mouth 2 times daily       calcium-vitamin D (CALTRATE) 600-400 MG-UNIT per tablet Take 1 tablet by mouth daily       escitalopram (LEXAPRO) 10 MG tablet Take 1 tablet (10 mg) by mouth daily TAKE 1&1/2 TABLETS (15 MG) BY MOUTH DAILY 90 tablet 4     metoprolol tartrate (LOPRESSOR) 50 MG tablet Take 1 tablet (50 mg) by mouth 2 times daily       Multiple Vitamins-Minerals (CENTRUM SILVER) per tablet Take 1 tablet by mouth daily Has 2000 IU of Vitamin D in it. 30 tablet      nitroGLYcerin (NITROSTAT) 0.4 MG sublingual tablet Place 1 tablet (0.4 mg) under the tongue every 5 minutes as needed for chest pain 25 tablet 3     simvastatin (ZOCOR) 40 MG tablet Take 1 tablet (40 mg) by mouth daily 90 tablet 3     warfarin ANTICOAGULANT (COUMADIN) 5 MG tablet Take 0.5 tablet (2.5 mg) by mouth on Monday, Wednesday, Friday and 1 tablet (5 mg) on all other days or as directed. 90 tablet 0     Allergies   Allergen Reactions     Codeine Nausea and Vomiting     Codeine      DOESN'T FEEL WELL     Latex Itching     Lisinopril      cough     Tamiflu [Oseltamivir] GI Disturbance     Diarrhea and vomiting (would try Relenza)      "Atorvastatin Calcium      Myalgias       Sulfa Drugs Nausea and Vomiting     DOES'NT FEEL WELL         Review of Systems   Constitutional: Negative for chills and fever.   HENT: Positive for hearing loss. Negative for congestion, ear pain and sore throat.    Eyes: Negative for pain and visual disturbance.   Respiratory: Negative for cough and shortness of breath.    Cardiovascular: Positive for peripheral edema. Negative for chest pain and palpitations.   Gastrointestinal: Negative for abdominal pain, constipation, diarrhea, heartburn, hematochezia and nausea.   Breasts:  Negative for tenderness, breast mass and discharge.   Genitourinary: Positive for urgency. Negative for dysuria, frequency, genital sores, hematuria, pelvic pain, vaginal bleeding and vaginal discharge.   Musculoskeletal: Negative for arthralgias, joint swelling and myalgias.   Skin: Negative for rash.   Neurological: Positive for dizziness and weakness. Negative for headaches and paresthesias.   Psychiatric/Behavioral: Positive for mood changes. The patient is nervous/anxious.          OBJECTIVE:   /62   Pulse 102   Temp 97.3  F (36.3  C) (Oral)   Resp 18   Ht 1.575 m (5' 2\")   Wt 61.7 kg (136 lb)   LMP  (LMP Unknown)   SpO2 98%   BMI 24.87 kg/m   Estimated body mass index is 24.87 kg/m  as calculated from the following:    Height as of this encounter: 1.575 m (5' 2\").    Weight as of this encounter: 61.7 kg (136 lb).  Physical Exam  GENERAL: healthy, alert and no distress  EYES: Eyes grossly normal to inspection, PERRL and conjunctivae and sclerae normal  HENT: ear canals and TM's normal, nose and mouth without ulcers or lesions  NECK: no adenopathy, no asymmetry, masses, or scars and thyroid normal to palpation  RESP: rhonchi bibasilar  CV: regular rate and rhythm, normal S1 S2, no S3 or S4, no murmur, click or rub, no peripheral edema and peripheral pulses strong  ABDOMEN: soft, nontender, no hepatosplenomegaly, no masses and " bowel sounds normal  MS: no gross musculoskeletal defects noted, no edema  NEURO: Normal strength and tone, sensory exam grossly normal and abnormal mental status - confused  PSYCH: confused and affect normal/bright    Diagnostic Test Results:  pending    ASSESSMENT / PLAN:   1. Encounter for Medicare annual wellness exam    - Urine Microscopic    2. Hyperlipidemia LDL goal <70  Stable.  Continue current treatment plan and medications.   - simvastatin (ZOCOR) 40 MG tablet; Take 1 tablet (40 mg) by mouth daily  Dispense: 90 tablet; Refill: 3    3. Paroxysmal atrial fibrillation (H)  Stable.  Continue current treatment plan and medications.   - warfarin ANTICOAGULANT (COUMADIN) 5 MG tablet; Take 0.5 tablet (2.5 mg) by mouth on Monday, Wednesday, Friday and 1 tablet (5 mg) on all other days or as directed.  Dispense: 90 tablet; Refill: 0    4. Need for influenza vaccination    - HC FLU VACCINE, INCREASED ANTIGEN, PRESV FREE [22515]    5. Memory loss  Consider neuropsych testing pending results.  MRI to rule to rule out NPH, mass, CVA.  Possible progression of dementia.  - CBC with platelets and differential  - Comprehensive metabolic panel (BMP + Alb, Alk Phos, ALT, AST, Total. Bili, TP)  - *UA reflex to Microscopic and Culture ([facility] (except [place] and [place])  - Vitamin B12  - MR Brain w/o Contrast; Future    6. Hallucinations  As above.  - CBC with platelets and differential  - Comprehensive metabolic panel (BMP + Alb, Alk Phos, ALT, AST, Total. Bili, TP)  - *UA reflex to Microscopic and Culture ([facility] (except [place] and [place])  - MR Brain w/o Contrast; Future    7. Abnormal lung sounds    - XR Chest 2 Views; Future    COUNSELING:  Reviewed preventive health counseling, as reflected in patient instructions       Regular exercise       Healthy diet/nutrition       Immunizations    Vaccinated for: Influenza          Estimated body mass index is 24.87 kg/m  as  "calculated from the following:    Height as of this encounter: 1.575 m (5' 2\").    Weight as of this encounter: 61.7 kg (136 lb).        She reports that she has never smoked. She has never used smokeless tobacco.      Appropriate preventive services were discussed with this patient, including applicable screening as appropriate for cardiovascular disease, diabetes, osteopenia/osteoporosis, and glaucoma.  As appropriate for age/gender, discussed screening for colorectal cancer, prostate cancer, breast cancer, and cervical cancer. Checklist reviewing preventive services available has been given to the patient.    Reviewed patients plan of care and provided an AVS. The Basic Care Plan (routine screening as documented in Health Maintenance) for Shannan meets the Care Plan requirement. This Care Plan has been established and reviewed with the Patient and daughter.    Counseling Resources:  ATP IV Guidelines  Pooled Cohorts Equation Calculator  Breast Cancer Risk Calculator  Breast Cancer: Medication to Reduce Risk  FRAX Risk Assessment  ICSI Preventive Guidelines  Dietary Guidelines for Americans, 2010  Reframe It's MyPlate  ASA Prophylaxis  Lung CA Screening    ANDREW Kaplan CNP  [facility]    Identified Health Risks:    The patient was provided with suggestions to help her develop a healthy physical lifestyle.  She is at risk for lack of exercise and has been provided with information to increase physical activity for the benefit of her well-being.  The patient was counseled and encouraged to consider modifying their diet and eating habits. She was provided with information on recommended healthy diet options.  The patient reports that she has difficulty with activities of daily living. I have asked that the patient make a follow up appointment in 26 weeks where this issue will be further evaluated and addressed.  The patient was provided with written information regarding signs of hearing " loss.  Information on urinary incontinence and treatment options given to patient.  The patient was provided with suggestions to help her develop a healthy emotional lifestyle.  She is at risk for falling and has been provided with information to reduce the risk of falling at home.

## 2020-09-01 NOTE — PATIENT INSTRUCTIONS
Patient Education   Personalized Prevention Plan  You are due for the preventive services outlined below.  Your care team is available to assist you in scheduling these services.  If you have already completed any of these items, please share that information with your care team to update in your medical record.  Health Maintenance Due   Topic Date Due     Anxiety Assessment  01/25/2018     Annual Wellness Visit  06/03/2020     Flu Vaccine (1) 09/01/2020     Depression Assessment  09/10/2020     Your Health Risk Assessment indicates you feel you are not in good health    A healthy lifestyle helps keep the body fit and the mind alert. It helps protect you from disease, helps you fight disease, and helps prevent chronic disease (disease that doesn't go away) from getting worse. This is important as you get older and begin to notice twinges in muscles and joints and a decline in the strength and stamina you once took for granted. A healthy lifestyle includes good healthcare, good nutrition, weight control, recreation, and regular exercise. Avoid harmful substances and do what you can to keep safe. Another part of a healthy lifestyle is stay mentally active and socially involved.    Good healthcare     Have a wellness visit every year.     If you have new symptoms, let us know right away. Don't wait until the next checkup.     Take medicines exactly as prescribed and keep your medicines in a safe place. Tell us if your medicine causes problems.   Healthy diet and weight control     Eat 3 or 4 small, nutritious, low-fat, high-fiber meals a day. Include a variety of fruits, vegetables, and whole-grain foods.     Make sure you get enough calcium in your diet. Calcium, vitamin D, and exercise help prevent osteoporosis (bone thinning).     If you live alone, try eating with others when you can. That way you get a good meal and have company while you eat it.     Try to keep a healthy weight. If you eat more calories than  your body uses for energy, it will be stored as fat and you will gain weight.     Recreation   Recreation is not limited to sports and team events. It includes any activity that provides relaxation, interest, enjoyment, and exercise. Recreation provides an outlet for physical, mental, and social energy. It can give a sense of worth and achievement. It can help you stay healthy.    Mental Exercise and Social Involvement  Mental and emotional health is as important as physical health. Keep in touch with friends and family. Stay as active as possible. Continue to learn and challenge yourself.   Things you can do to stay mentally active are:    Learn something new, like a foreign language or musical instrument.     Play SCRABBLE or do crossword puzzles. If you cannot find people to play these games with you at home, you can play them with others on your computer through the Internet.     Join a games club--anything from card games to chess or checkers or lawn bowling.     Start a new hobby.     Go back to school.     Volunteer.     Read.   Keep up with world events.    Exercise for a Healthier Heart     Exercise with a friend. When activity is fun, you're more likely to stick with it.   You may wonder how you can improve the health of your heart. If you re thinking about exercise, you re on the right track. You don t need to become an athlete, but you do need a certain amount of brisk exercise to help strengthen your heart. If you have been diagnosed with a heart condition, your doctor may recommend exercise to help stabilize your condition. To help make exercise a habit, choose safe, fun activities.  Be sure to check with your healthcare provider before starting an exercise program.  Why exercise?  Exercising regularly offers many healthy rewards. It can help you do all of the following:    Improve your blood cholesterol level to help prevent further heart trouble    Lower your blood pressure to help prevent a stroke  or heart attack    Control diabetes, or reduce your risk of getting this disease    Improve your heart and lung function    Reach and maintain a healthy weight    Make your muscles stronger and more limber so you can stay active    Prevent falls and fractures by slowing the loss of bone mass (osteoporosis)    Manage stress better    Reduce your blood pressure    Improve your sense of self and your body image  Exercise tips  Ease into your routine. Set small goals. Then build on them.  Exercise on most days. Aim for a total of 150 or more minutes of moderate to  vigorous intensity activity each week. Consider 40 minutes, 3 to 4 times a week. For best results, activity should last for 40 minutes on average. It is OK to work up to the 40 minute period over time. Examples of moderate-intensity activity is walking 1 mile in 15 minutes or 30 to 45 minutes of yard work.  Step up your daily activity level. Along with your exercise program, try being more active throughout the day. Walk instead of drive. Do more household tasks or yard work.  Choose one or more activities you enjoy. Walking is one of the easiest things you can do. You can also try swimming, riding a bike, dancing, or taking an exercise class.  Stop exercising and call your doctor if you:    Have chest pain or feel dizzy or lightheaded    Feel burning, tightness, pressure, or heaviness in your chest, neck, shoulders, back, or arms    Have unusual shortness of breath    Have increased joint or muscle pain    Have palpitations or an irregular heartbeat  Date Last Reviewed: 5/1/2016 2000-2019 [facility]. [address] [place] [address]. All rights reserved. This information is not intended as a substitute for professional medical care. Always follow your healthcare professional's instructions.          Understanding USDA MyPlate  The USDA (U.S. Department of Agriculture) has guidelines to help you make healthy food choices. These are  called MyPlate. MyPlate shows the food groups that make up healthy meals using the image of a place setting. Before you eat, think about the healthiest choices for what to put onto your plate or into your cup or bowl. To learn more about building a healthy plate, visit www.choosemyplate.gov.    The food groups    Fruits. Any fruit or 100% fruit juice counts as part of the Fruit Group. Fruits may be fresh, canned, frozen, or dried, and may be whole, cut-up, or pureed. Make half your plate fruits and vegetables.    Vegetables. Any vegetable or 100% vegetable juice counts as a member of the Vegetable Group. Vegetables may be fresh, frozen, canned, or dried. They can be served raw or cooked and may be whole, cut-up, or mashed. Make half your plate fruits and vegetables.    Grains. All foods made from grains are part of the Grains Group. These include wheat, rice, oats, cornmeal, and barley such as bread, pasta, oatmeal, cereal, tortillas, and grits. Grains should be no more than a quarter of your plate. At least half of your grains should be whole grains.    Protein. This group includes meat, poultry, seafood, beans and peas, eggs, processed soy products (like tofu), nuts (including nut butters), and seeds. Make protein choices no more than a quarter of your plate. Meat and poultry choices should be lean or low fat.    Dairy. All fluid milk products and foods made from milk that contain calcium, like yogurt and cheese, are part of the Dairy Group. (Foods that have little calcium, such as cream, butter, and cream cheese, are not part of the group.) Most dairy choices should be low-fat or fat-free.    Oils. These are fats that are liquid at room temperature. They include canola, corn, olive, soybean, and sunflower oil. Foods that are mainly oil include mayonnaise, certain salad dressings, and soft margarines. You should have only 5 to 7 teaspoons of oils a day. You probably already get this much from the food you  eat.  Date Last Reviewed: 8/1/2017 2000-2019 [employer]. [address] [place] [address]. All rights reserved. This information is not intended as a substitute for professional medical care. Always follow your healthcare professional's instructions.        Activities of Daily Living    Your Health Risk Assessment indicates you have difficulties with activities of daily living such as housework, bathing, preparing meals, taking medication, etc. Please make a follow up appointment for us to address this issue in more detail.    Signs of Hearing Loss     Hearing much better with one ear can be a sign of hearing loss.     Hearing loss is a problem shared by many people. In fact, it is one of the most common health conditions, particularly as people age. Most people over age 65 have some hearing loss, and by age 80, almost everyone does. Because hearing loss usually occurs slowly over the years, you may not realize your hearing ability has gotten worse.  Have your hearing checked  Contact your healthcare provider if you:    Have to strain to hear normal conversation    Have to watch other people s faces very carefully to follow what they re saying    Need to ask people to repeat what they ve said    Often misunderstand what people are saying    Turn the volume of the television or radio up so high that others complain    Feel that people are mumbling when they re talking to you    Find that the effort to hear leaves you feeling tired and irritated    Notice, when using the phone, that you hear better with one ear than the other  Date Last Reviewed: 12/1/2016 2000-2019 [employer]. [address] [place] [address]. All rights reserved. This information is not intended as a substitute for professional medical care. Always follow your healthcare professional's instructions.          Urinary Incontinence, Female (Adult)  Urinary incontinence means loss of control of the  bladder. This problem affects many women, especially as they get older. If you have incontinence, you may be embarrassed to ask for help. But know that this problem can be treated.  Types of Incontinence  There are different types of incontinence. Two of the main types are described here. You can have more than one type.    Stress incontinence. With this type, urine leaks when pressure (stress) is put on the bladder. This may happen when you cough, sneeze, or laugh. Stress incontinence most often occurs because the pelvic floor muscles that support the bladder and urethra are weak. This can happen after pregnancy and vaginal childbirth or a hysterectomy. It can also be due to excess body weight or hormone changes.    Urge incontinence (also called overactive bladder). With this type, a sudden urge to urinate is felt often. This may happen even though there may not be much urine in the bladder. The need to urinate often during the night is common. Urge incontinence most often occurs because of bladder spasms. This may be due to bladder irritation or infection. Damage to bladder nerves or pelvic muscles, constipation, and certain medicines can also lead to urge incontinence.  Treatment of urinary incontinence depends on the cause. Further evaluation is needed to find the type you have. This will likely include an exam and certain tests. Based on the results, you and your healthcare provider can then plan treatment. Until a diagnosis is made, the home care tips below can help relieve symptoms.  Home care    Do pelvic floor muscle exercises, if they are prescribed. The pelvic floor muscles help support the bladder and urethra. Many women find that their symptoms improve when doing special exercises that strengthen these muscles. To do the exercises contract the muscles you would use to stop your stream of urine, but do this when you re not urinating. Hold for 10 seconds, then relax. Repeat 10 to 20 times in a row, at  least 3 times a day. Your provider may give you other instructions for how to do the exercises and how often.    Keep a bladder diary. This helps track how often and how much you urinate over a set period of time. Bring this diary with you to your next visit with the provider. The information can help your provider learn more about your bladder problem.    Lose weight, if advised to by your provider. Excess weight puts pressure on the bladder. Your provider can help you create a weight-loss plan that s right for you. This may include exercising more and making certain diet changes.    Don't consume foods and drinks that may irritate the bladder. These can include alcohol and caffeinated drinks.    Quit smoking. Smoking and other tobacco use can lead to chronic cough that strains the pelvic floor muscles. Smoking may also damage the bladder and urethra. Talk with your provider about treatments or methods you can use to quit smoking.    If drinking large amounts of fluid causes you to have symptoms, you may be advised to limit your fluid intake. You may also be advised to drink most of your fluids during the day and to limit fluids at night.    If you re worried about urine leakage or accidents, you may wear absorbent pads to catch urine. Change the pads often. This helps reduce discomfort. It may also reduce the risk of skin or bladder infections.  Follow-up care  Follow up with your healthcare provider, or as directed. It may take some to find the right treatment for your problem. Your treatment plan may include special therapies or medicines. Certain procedures or surgery may also be options. Be sure to discuss any questions you have with your provider.  When to seek medical advice  Call the healthcare provider right away if any of these occur:    Fever of 100.4 F (38 C) or higher, or as directed by your provider    Bladder pain or fullness    Abdominal swelling    Nausea or vomiting    Back pain    Weakness,  dizziness or fainting  Date Last Reviewed: 10/1/2017    8576-0780 [employer]. [address] [facility], [place] [address]. All rights reserved. This information is not intended as a substitute for professional medical care. Always follow your healthcare professional's instructions.        Your Health Risk Assessment indicates you feel you are not in good emotional health.    Recreation   Recreation is not limited to sports and team events. It includes any activity that provides relaxation, interest, enjoyment, and exercise. Recreation provides an outlet for physical, mental, and social energy. It can give a sense of worth and achievement. It can help you stay healthy.    Mental Exercise and Social Involvement  Mental and emotional health is as important as physical health. Keep in touch with friends and family. Stay as active as possible. Continue to learn and challenge yourself.   Things you can do to stay mentally active are:    Learn something new, like a foreign language or musical instrument.     Play SCRABBLE or do crossword puzzles. If you cannot find people to play these games with you at home, you can play them with others on your computer through the Internet.     Join a games club--anything from card games to chess or checkers or lawn bowling.     Start a new hobby.     Go back to school.     Volunteer.     Read.   Keep up with world events.    Preventing Falls in the Home  An adult or child can fall for many reasons. If you are an older adult, you may fall because your reaction time slows down. Your muscles and joints may get stiff, weak, or less flexible because of illness, medicines, or a physical condition. These things can also make a child more likely to fall or be injured in a fall.  Other health problems that make falls more likely include:    Arthritis    Dizziness or lightheadedness when you get out of bed (orthostatic hypotension)    History of a  stroke    Dizziness    Anemia    Certain medicines taken for mental illness or to control blood pressure.    Problems with balance or gait    Bladder or urinary problems    History of falls with or without an injury    Changes in vision (vision impairment)    Changes in thinking skills and memory (cognitive impairment)  Injuries from a fall can include broken bones, dislocated joints, internal bleeding and cuts. When these injuries are serious enough, they can make it impossible for you or a child who is injured in a fall to live on his or her ownhome.  Prevention tips  To help prevent falls and fall-related injuries, follow the tips below.   Floors  Make floors safer by doing the following:     Put nonskid pads under area rugs.    Remove throw rugs.    Replace worn floor coverings.    Tack carpets firmly to each step on carpeted stairs. Put nonskid strips on the edges of uncarpeted stairs.    Keep floors and stairs free of clutter and cords.    Arrange furniture so there are clear pathways.    Clean up any spills right away.    Wear shoes that fit.  Bathrooms    Make bathrooms safer by doing the following:     Install grab bars in the tub or shower.    Apply nonskid strips or put a nonskid rubber mat in the tub or shower.    Sit on a bath chair to bathe.    Use bathmats with nonskid backing.  Lighting and the environment  Improve lighting in your home by doing the following:     Keep a flashlight in each room. Or put a lamp next to the bed within easy reach.    Put nightlights in the bedrooms, hallways, kitchen, and bathrooms.    Make sure all stairways have good lighting.    Take your time when going up and down stairs.    Put handrails on both sides of stairs and in walkways for more support. To prevent injury to your wrist or arm, don t use handrails to pull yourself up.    Install grab bars to pull yourself up.    Move or rearrange items that you use often. This will make them easier to find or reach.    Look  at your home to find any safety hazards. Especially look at doorways, walkways, and the driveway. Remove or repair any safety problems that you find.  Date Last Reviewed: 8/1/2016 2000-2019 [employer]. [address] [place] [address], [place] [address]. All rights reserved. This information is not intended as a substitute for professional medical care. Always follow your healthcare professional's instructions.

## 2020-09-02 ENCOUNTER — MYC MEDICAL ADVICE (OUTPATIENT)
Dept: FAMILY MEDICINE | Facility: CLINIC | Age: 85
End: 2020-09-02

## 2020-09-02 LAB
ALBUMIN SERPL-MCNC: 3.7 G/DL (ref 3.4–5)
ALP SERPL-CCNC: 74 U/L (ref 40–150)
ALT SERPL W P-5'-P-CCNC: 22 U/L (ref 0–50)
ANION GAP SERPL CALCULATED.3IONS-SCNC: 5 MMOL/L (ref 3–14)
AST SERPL W P-5'-P-CCNC: 25 U/L (ref 0–45)
BILIRUB SERPL-MCNC: 1 MG/DL (ref 0.2–1.3)
BUN SERPL-MCNC: 24 MG/DL (ref 7–30)
CALCIUM SERPL-MCNC: 9.4 MG/DL (ref 8.5–10.1)
CHLORIDE SERPL-SCNC: 108 MMOL/L (ref 94–109)
CO2 SERPL-SCNC: 27 MMOL/L (ref 20–32)
CREAT SERPL-MCNC: 1.11 MG/DL (ref 0.52–1.04)
GFR SERPL CREATININE-BSD FRML MDRD: 44 ML/MIN/{1.73_M2}
GLUCOSE SERPL-MCNC: 109 MG/DL (ref 70–99)
POTASSIUM SERPL-SCNC: 4.4 MMOL/L (ref 3.4–5.3)
PROT SERPL-MCNC: 7.4 G/DL (ref 6.8–8.8)
SODIUM SERPL-SCNC: 140 MMOL/L (ref 133–144)
VIT B12 SERPL-MCNC: 1908 PG/ML (ref 193–986)

## 2020-09-03 ENCOUNTER — ANTICOAGULATION THERAPY VISIT (OUTPATIENT)
Dept: FAMILY MEDICINE | Facility: CLINIC | Age: 85
End: 2020-09-03

## 2020-09-03 DIAGNOSIS — Z79.01 LONG TERM CURRENT USE OF ANTICOAGULANT THERAPY: ICD-10-CM

## 2020-09-03 DIAGNOSIS — I48.0 PAROXYSMAL ATRIAL FIBRILLATION (H): ICD-10-CM

## 2020-09-03 LAB — INR PPP: 3.2 (ref 0.9–1.1)

## 2020-09-03 NOTE — PROGRESS NOTES
ANTICOAGULATION MANAGEMENT     Patient Name:  Shannan Magdaleno  Date:  9/3/2020    ASSESSMENT /SUBJECTIVE:    Today's INR result of 3.2 is supratherapeutic. Goal INR of 2.0-3.0      Warfarin dose taken: Warfarin taken as previously instructed    Diet: Poor Appetite may be affecting diet and INR    Medication changes/ interactions: No new medications/supplements affecting INR    Previous INR: Supratherapeutic     S/S of bleeding or thromboembolism: No    New injury or illness: No    Upcoming surgery, procedure or cardioversion: No    Additional findings: Hasn't been feeling the greatest this week, they did tests yesterday, but now needs CT scan, advised to call back if any changes occur based on those results      PLAN:    Spoke with Minerva, daughter, regarding INR result and instructed:     Warfarin Dosing Instructions: Change your warfarin dose to 5 mg every Sun, Tue, Thu; 2.5 mg all other days    Instructed patient to follow up no later than: 2 weeks  Orders given to [facility] ([phone])    Education provided: Target INR goal and significance of current INR result, Importance of notifying clinic for changes in medications and Importance of notifying clinic for diarrhea, nausea/vomiting, reduced intake and/or illness      Minerva verbalizes understanding and agrees to warfarin dosing plan.    Instructed to call the Anticoagulation Clinic for any changes, questions or concerns. (#[phone])        Sussy Leslie RN      OBJECTIVE:  Recent labs: (last 7 days)     09/03/20   INR 3.2*         No question data found.  Anticoagulation Summary  As of 9/3/2020    INR goal:   2.0-3.0   TTR:   65.8 % (1 y)   INR used for dosing:   3.2! (9/3/2020)   Warfarin maintenance plan:   5 mg (5 mg x 1) every Sun, Tue, Thu; 2.5 mg (5 mg x 0.5) all other days   Full warfarin instructions:   5 mg every Sun, Tue, Thu; 2.5 mg all other days   Weekly warfarin total:   25 mg   Plan last modified:   Susys Leslie, RN  (9/3/2020)   Next INR check:   9/17/2020   Priority:   Maintenance   Target end date:   Indefinite    Indications    Paroxysmal atrial fibrillation (H) [I48.0]  Long-term (current) use of anticoagulants [Z79.01] [Z79.01]             Anticoagulation Episode Summary     INR check location:       Preferred lab:       Send INR reminders to:   ARNOLDO OSULLIVAN    Comments:   Dtr Minerva sets up meds in a med box with alarm reminder. Warfarin is set up in the AM,  it can be difficult to do same day dose adjustments. CALL ANEUDY KUMARI WITH ALL DOSING/ASSESMENT [phone], patient is having confusion      Anticoagulation Care Providers     Provider Role Specialty Phone number    Adela Morgan MD Responsible Internal Medicine [phone]

## 2020-09-08 ENCOUNTER — ANCILLARY PROCEDURE (OUTPATIENT)
Dept: MRI IMAGING | Facility: CLINIC | Age: 85
End: 2020-09-08
Attending: NURSE PRACTITIONER
Payer: COMMERCIAL

## 2020-09-08 ENCOUNTER — ANCILLARY PROCEDURE (OUTPATIENT)
Dept: CT IMAGING | Facility: CLINIC | Age: 85
End: 2020-09-08
Attending: NURSE PRACTITIONER
Payer: COMMERCIAL

## 2020-09-08 DIAGNOSIS — R93.89 ABNORMAL CXR: ICD-10-CM

## 2020-09-08 DIAGNOSIS — R31.29 MICROHEMATURIA: ICD-10-CM

## 2020-09-08 DIAGNOSIS — R41.3 MEMORY LOSS: ICD-10-CM

## 2020-09-08 DIAGNOSIS — D69.6 DECREASED PLATELET COUNT (H): ICD-10-CM

## 2020-09-08 DIAGNOSIS — R44.3 HALLUCINATIONS: ICD-10-CM

## 2020-09-08 DIAGNOSIS — N28.9 DECREASED RENAL FUNCTION: ICD-10-CM

## 2020-09-08 PROCEDURE — 74176 CT ABD & PELVIS W/O CONTRAST: CPT | Mod: TC

## 2020-09-08 PROCEDURE — 71250 CT THORAX DX C-: CPT | Mod: TC

## 2020-09-08 PROCEDURE — A9585 GADOBUTROL INJECTION: HCPCS

## 2020-09-08 PROCEDURE — 70553 MRI BRAIN STEM W/O & W/DYE: CPT | Mod: TC

## 2020-09-08 RX ORDER — GADOBUTROL 604.72 MG/ML
7.5 INJECTION INTRAVENOUS ONCE
Status: COMPLETED | OUTPATIENT
Start: 2020-09-08 | End: 2020-09-08

## 2020-09-08 RX ADMIN — GADOBUTROL 6 ML: 604.72 INJECTION INTRAVENOUS at 17:43

## 2020-09-09 ENCOUNTER — TELEPHONE (OUTPATIENT)
Dept: FAMILY MEDICINE | Facility: CLINIC | Age: 85
End: 2020-09-09

## 2020-09-09 NOTE — TELEPHONE ENCOUNTER
I notified patient's daughter Minerva of MRI results and CT scan results.  MRI of her brain did not show any acute findings.  I offered neuropsych testing and she will discuss with patient.  We also discussed starting donepezil for her memory loss.  Patient does have 24 hour supervision and does not drive.  Her daughter does not feel they need additional resources at this time.    Patient's CT scan showed severe multifocal adenopathy through her abdomen, pelvis, and chest.  It's hard to compare this to previous adenopathy on ultrasound.  I asked that patient follow-up with her oncologist to discuss further, as well as thrombocytopenia.  Patient's daughter agrees to make an appointment.    Dorothy Yin, CNP

## 2020-09-11 ENCOUNTER — TRANSFERRED RECORDS (OUTPATIENT)
Dept: HEALTH INFORMATION MANAGEMENT | Facility: CLINIC | Age: 85
End: 2020-09-11

## 2020-09-18 ENCOUNTER — TELEPHONE (OUTPATIENT)
Dept: FAMILY MEDICINE | Facility: CLINIC | Age: 85
End: 2020-09-18

## 2020-09-18 NOTE — TELEPHONE ENCOUNTER
Patient missed in home lab appointment today. Rescheduled for Monday 9/21/20.    Barbara Parikh RN on 9/18/2020 at 1:19 PM

## 2020-09-21 ENCOUNTER — ANTICOAGULATION THERAPY VISIT (OUTPATIENT)
Dept: FAMILY MEDICINE | Facility: CLINIC | Age: 85
End: 2020-09-21

## 2020-09-21 DIAGNOSIS — I48.0 PAROXYSMAL ATRIAL FIBRILLATION (H): ICD-10-CM

## 2020-09-21 DIAGNOSIS — Z79.01 LONG TERM CURRENT USE OF ANTICOAGULANT THERAPY: ICD-10-CM

## 2020-09-21 LAB — INR PPP: 3.5 (ref 0.9–1.1)

## 2020-09-21 NOTE — PROGRESS NOTES
ANTICOAGULATION MANAGEMENT     Patient Name:  Shannan Magdaleno  Date:  9/21/2020    ASSESSMENT /SUBJECTIVE:    Today's INR result of 3.5 is supratherapeutic. Goal INR of 2.0-3.0      Warfarin dose taken: Warfarin taken as instructed    Diet: No new diet changes affecting INR    Medication changes/ interactions: No new medications/supplements affecting INR    Previous INR: Supratherapeutic     S/S of bleeding or thromboembolism: No    New injury or illness: No    Upcoming surgery, procedure or cardioversion: Yes: Biopsy on Friday 9/25    Additional findings: Patient was advised to hold for 5 days starting today (9/21)      PLAN:    Spoke with Ming Palma regarding INR result and instructed:     Warfarin Dosing Instructions: hold warfarin per MD order    Instructed patient to follow up no later than: 7-10 days (2-3 days after restarting warfarin)  Orders given to [facility] ([phone])    Education provided: Importance of notifying clinic of upcoming surgeries and procedures 2 weeks in advance      Minerva verbalizes understanding and agrees to warfarin dosing plan.    Instructed to call the Anticoagulation Clinic for any changes, questions or concerns. (#[phone])        Maria Guadalupe Pelaez RN      OBJECTIVE:  Recent labs: (last 7 days)     09/21/20   INR 3.5*         No question data found.  Anticoagulation Summary  As of 9/21/2020    INR goal:   2.0-3.0   TTR:   60.9 % (1 y)   INR used for dosing:   3.5! (9/21/2020)   Warfarin maintenance plan:   5 mg (5 mg x 1) every Sun, Thu; 2.5 mg (5 mg x 0.5) all other days   Full warfarin instructions:   5 mg every Sun, Thu; 2.5 mg all other days   Weekly warfarin total:   22.5 mg   Plan last modified:   Maria Guadalupe Pelaez, RN (9/21/2020)   Next INR check:      Priority:   Maintenance   Target end date:   Indefinite    Indications    Paroxysmal atrial fibrillation (H) [I48.0]  Long-term (current) use of anticoagulants [Z79.01] [Z79.01]             Anticoagulation  Episode Summary     INR check location:       Preferred lab:       Send INR reminders to:   ARNOLDO OSULLIVAN    Comments:   Dtr Minerva sets up meds in a med box with alarm reminder. Warfarin is set up in the AM,  it can be difficult to do same day dose adjustments. CALL DAUGHTER MINERVA WITH ALL DOSING/ASSESMENT [phone], patient is having confusion      Anticoagulation Care Providers     Provider Role Specialty Phone number    Adela Morgan MD Responsible Internal Medicine [phone]         Maria Guadalupe Brown RN, BSN, PHN

## 2020-09-29 ENCOUNTER — ANTICOAGULATION THERAPY VISIT (OUTPATIENT)
Dept: FAMILY MEDICINE | Facility: CLINIC | Age: 85
End: 2020-09-29

## 2020-09-29 DIAGNOSIS — Z79.01 LONG TERM CURRENT USE OF ANTICOAGULANT THERAPY: ICD-10-CM

## 2020-09-29 DIAGNOSIS — I48.0 PAROXYSMAL ATRIAL FIBRILLATION (H): ICD-10-CM

## 2020-09-29 LAB — INR PPP: 1.6 (ref 0.9–1.1)

## 2020-09-29 NOTE — PROGRESS NOTES
ANTICOAGULATION MANAGEMENT     Patient Name:  Shannan Magdaleno  Date:  2020    ASSESSMENT /SUBJECTIVE:    Today's INR result of 1.6 is subtherapeutic. Goal INR of 2.0-3.0      Warfarin dose taken: Warfarin recently held for Procedure which may be affecting INR    Diet: No new diet changes affecting INR    Medication changes/ interactions: No new medications/supplements affecting INR    Previous INR: Supratherapeutic     S/S of bleeding or thromboembolism: No    New injury or illness: No    Upcoming surgery, procedure or cardioversion: No  Additional findings: Eating less, not feeling well    PLAN:    Telephone call with  Minerva, daughter, regarding INR result and instructed:     Warfarin Dosing Instructions: 5 mg tonight then continue your current warfarin dose of 5 mg on mon/sat and 2.5 mg all other days    Instructed patient to follow up no later than: 1 week  Orders given to [facility] ([phone]), Mercedes was given recheck date.    Education provided: None required      Minerva, daughter, verbalizes understanding and agrees to warfarin dosing plan.    Instructed to call the Anticoagulation Clinic for any changes, questions or concerns. (#[phone])        Liset Min RN      OBJECTIVE:  Recent labs: (last 7 days)     20   INR 1.6*         No question data found.  Anticoagulation Summary  As of 2020    INR goal:   2.0-3.0   TTR:   60.4 % (1 y)   INR used for dosin.6! (2020)   Warfarin maintenance plan:   5 mg (5 mg x 1) every Mon, Sat; 2.5 mg (5 mg x 0.5) all other days   Full warfarin instructions:   5 mg every Mon, Sat; 2.5 mg all other days   Weekly warfarin total:   22.5 mg   Plan last modified:   Maria Guadalupe Pelaez, RN (2020)   Next INR check:   10/6/2020   Priority:   Maintenance   Target end date:   Indefinite    Indications    Paroxysmal atrial fibrillation (H) [I48.0]  Long-term (current) use of anticoagulants [Z79.01] [Z79.01]             Anticoagulation  Episode Summary     INR check location:       Preferred lab:       Send INR reminders to:   ARNOLDO OSULLIVAN    Comments:   Dtr Minerva sets up meds in a med box with alarm reminder. Warfarin is set up in the AM,  it can be difficult to do same day dose adjustments. CALL DAUGHTER MINERVA WITH ALL DOSING/ASSESMENT [phone], patient is having confusion      Anticoagulation Care Providers     Provider Role Specialty Phone number    Adela Morgan MD Responsible Internal Medicine [phone]

## 2020-10-02 ENCOUNTER — TRANSFERRED RECORDS (OUTPATIENT)
Dept: HEALTH INFORMATION MANAGEMENT | Facility: CLINIC | Age: 85
End: 2020-10-02

## 2020-10-06 ENCOUNTER — ANTICOAGULATION THERAPY VISIT (OUTPATIENT)
Dept: FAMILY MEDICINE | Facility: CLINIC | Age: 85
End: 2020-10-06

## 2020-10-06 ENCOUNTER — TRANSFERRED RECORDS (OUTPATIENT)
Dept: HEALTH INFORMATION MANAGEMENT | Facility: CLINIC | Age: 85
End: 2020-10-06

## 2020-10-06 DIAGNOSIS — Z79.01 LONG TERM CURRENT USE OF ANTICOAGULANT THERAPY: ICD-10-CM

## 2020-10-06 DIAGNOSIS — I48.0 PAROXYSMAL ATRIAL FIBRILLATION (H): ICD-10-CM

## 2020-10-06 LAB — INR PPP: 2.8 (ref 0.9–1.1)

## 2020-10-06 NOTE — PROGRESS NOTES
ANTICOAGULATION MANAGEMENT     Patient Name:  Shannan Magdaleno  Date:  10/6/2020    ASSESSMENT /SUBJECTIVE:    Today's INR result of 2.8 is therapeutic. Goal INR of 2.0-3.0      Warfarin dose taken: Warfarin taken as instructed    Diet: No new diet changes affecting INR    Medication changes/ interactions: No new medications/supplements affecting INR    Previous INR: Subtherapeutic     S/S of bleeding or thromboembolism: No    New injury or illness: No    Upcoming surgery, procedure or cardioversion: No    Additional findings: None      PLAN:    Telephone call with  Minerva, daughter, regarding INR result and instructed:     Warfarin Dosing Instructions: Continue your current warfarin dose 5 mg on monday/sat and 2.5 mg all other days    Instructed patient to follow up no later than: 2 weeks  Orders given to [facility] ([phone]) Left voicemail with next recheck    Education provided: None required      Minerva, daughter, verbalizes understanding and agrees to warfarin dosing plan.    Instructed to call the Anticoagulation Clinic for any changes, questions or concerns. (#[phone])        Liset Min RN      OBJECTIVE:  Recent labs: (last 7 days)     10/06/20   INR 2.8*         No question data found.  Anticoagulation Summary  As of 10/6/2020    INR goal:  2.0-3.0   TTR:  61.7 % (1 y)   INR used for dosin.8 (10/6/2020)   Warfarin maintenance plan:  5 mg (5 mg x 1) every Mon, Sat; 2.5 mg (5 mg x 0.5) all other days   Full warfarin instructions:  5 mg every Mon, Sat; 2.5 mg all other days   Weekly warfarin total:  22.5 mg   Plan last modified:  Maria Guadalupe Pelaez, RN (2020)   Next INR check:  10/20/2020   Priority:  Maintenance   Target end date:  Indefinite    Indications    Paroxysmal atrial fibrillation (H) [I48.0]  Long-term (current) use of anticoagulants [Z79.01] [Z79.01]             Anticoagulation Episode Summary     INR check location:      Preferred lab:      Send INR reminders to:   ARNOLDO OSULLIVAN    Comments:  Dtr iMnerva sets up meds in a med box with alarm reminder. Warfarin is set up in the AM,  it can be difficult to do same day dose adjustments. CALL DAUGHTER MINERVA WITH ALL DOSING/ASSESMENT [phone], patient is having confusion      Anticoagulation Care Providers     Provider Role Specialty Phone number    Adela Morgan MD Responsible Internal Medicine [phone]

## 2020-10-07 ENCOUNTER — MYC MEDICAL ADVICE (OUTPATIENT)
Dept: FAMILY MEDICINE | Facility: CLINIC | Age: 85
End: 2020-10-07

## 2020-10-07 DIAGNOSIS — R29.6 FALLS FREQUENTLY: Primary | ICD-10-CM

## 2020-10-07 DIAGNOSIS — R41.3 MEMORY LOSS: ICD-10-CM

## 2020-10-20 ENCOUNTER — ANTICOAGULATION THERAPY VISIT (OUTPATIENT)
Dept: FAMILY MEDICINE | Facility: CLINIC | Age: 85
End: 2020-10-20

## 2020-10-20 DIAGNOSIS — I48.0 PAROXYSMAL ATRIAL FIBRILLATION (H): ICD-10-CM

## 2020-10-20 DIAGNOSIS — Z79.01 LONG TERM CURRENT USE OF ANTICOAGULANT THERAPY: ICD-10-CM

## 2020-10-20 LAB — INR PPP: 2.6 (ref 0.9–1.1)

## 2020-10-20 NOTE — PROGRESS NOTES
ANTICOAGULATION MANAGEMENT     Patient Name:  Shannan Magdaleno  Date:  10/20/2020    ASSESSMENT /SUBJECTIVE:    Today's INR result of 2.6 is therapeutic. Goal INR of 2.0-3.0      Warfarin dose taken: Warfarin taken as instructed    Diet: No new diet changes affecting INR    Medication changes/ interactions: No new medications/supplements affecting INR    Previous INR: Therapeutic     S/S of bleeding or thromboembolism: No    New injury or illness: No    Upcoming surgery, procedure or cardioversion: No    Additional findings: None      PLAN:    Telephone call with Shannan regarding INR result and instructed:     Warfarin Dosing Instructions: Continue your current warfarin dose 5 mg Mon/Sat and 2.5 mg ROW    Instructed patient to follow up no later than: 3 weeks  Orders given to [facility] ([phone])    Education provided: Target INR goal and significance of current INR result      Minerva verbalizes understanding and agrees to warfarin dosing plan.    Instructed to call the Anticoagulation Clinic for any changes, questions or concerns. (#[phone])        Moon Lobato RN      OBJECTIVE:  Recent labs: (last 7 days)     10/20/20  1433   INR 2.6*         No question data found.  Anticoagulation Summary  As of 10/20/2020    INR goal:  2.0-3.0   TTR:  65.6 % (1 y)   INR used for dosin.6 (10/20/2020)   Warfarin maintenance plan:  5 mg (5 mg x 1) every Mon, Sat; 2.5 mg (5 mg x 0.5) all other days   Full warfarin instructions:  5 mg every Mon, Sat; 2.5 mg all other days   Weekly warfarin total:  22.5 mg   Plan last modified:  Maria Guadalupe Pelaez RN (2020)   Next INR check:     Priority:  Maintenance   Target end date:  Indefinite    Indications    Paroxysmal atrial fibrillation (H) [I48.0]  Long-term (current) use of anticoagulants [Z79.01] [Z79.01]             Anticoagulation Episode Summary     INR check location:      Preferred lab:      Send INR reminders to:  ARNOLDO OSULLIVAN    Comments:  Kendra Palma  sets up meds in a med box with alarm reminder. Warfarin is set up in the AM,  it can be difficult to do same day dose adjustments. CALL DAUGHTER KENYETTA WITH ALL DOSING/ASSESMENT [phone], patient is having confusion      Anticoagulation Care Providers     Provider Role Specialty Phone number    Adela Morgan MD Responsible Internal Medicine [phone]

## 2020-11-06 ENCOUNTER — TRANSFERRED RECORDS (OUTPATIENT)
Dept: HEALTH INFORMATION MANAGEMENT | Facility: CLINIC | Age: 85
End: 2020-11-06

## 2020-11-10 ENCOUNTER — ANTICOAGULATION THERAPY VISIT (OUTPATIENT)
Dept: FAMILY MEDICINE | Facility: CLINIC | Age: 85
End: 2020-11-10

## 2020-11-10 DIAGNOSIS — I48.0 PAROXYSMAL ATRIAL FIBRILLATION (H): ICD-10-CM

## 2020-11-10 DIAGNOSIS — Z79.01 LONG TERM CURRENT USE OF ANTICOAGULANT THERAPY: ICD-10-CM

## 2020-11-10 LAB — INR PPP: 2.1 (ref 0.9–1.1)

## 2020-11-10 NOTE — PROGRESS NOTES
ANTICOAGULATION MANAGEMENT     Patient Name:  Shannan Magdaleno  Date:  11/10/2020    ASSESSMENT /SUBJECTIVE:    Today's INR result of 2.1 is therapeutic. Goal INR of 2.0-3.0      Warfarin dose taken: Warfarin taken as instructed    Diet: No new diet changes affecting INR    Medication changes/ interactions: No new medications/supplements affecting INR    Previous INR: Therapeutic     S/S of bleeding or thromboembolism: No    New injury or illness: No    Upcoming surgery, procedure or cardioversion: No    Additional findings: None      PLAN:    Telephone call with  Minerva, daughter, regarding INR result and instructed:     Warfarin Dosing Instructions: Continue your current warfarin dose 5 mg on mon/sat and 2.5 mg all other days    Instructed patient to follow up no later than: 4 weeks  Orders given to [facility] ([phone]), Spoke with Mercedes    Education provided: None required      Minerva, daughter,   verbalizes understanding and agrees to warfarin dosing plan.    Instructed to call the Anticoagulation Clinic for any changes, questions or concerns. (#[phone])        Liset Min RN      OBJECTIVE:  Recent labs: (last 7 days)     11/10/20   INR 2.1*         No question data found.  Anticoagulation Summary  As of 11/10/2020    INR goal:  2.0-3.0   TTR:  71.2 % (1 y)   INR used for dosin.1 (11/10/2020)   Warfarin maintenance plan:  5 mg (5 mg x 1) every Mon, Sat; 2.5 mg (5 mg x 0.5) all other days   Full warfarin instructions:  5 mg every Mon, Sat; 2.5 mg all other days   Weekly warfarin total:  22.5 mg   No change documented:  Liset Min RN   Plan last modified:  Maria Guadalupe Pelaez RN (2020)   Next INR check:  2020   Priority:  Maintenance   Target end date:  Indefinite    Indications    Paroxysmal atrial fibrillation (H) [I48.0]  Long-term (current) use of anticoagulants [Z79.01] [Z79.01]             Anticoagulation Episode Summary     INR check location:      Preferred lab:       Send INR reminders to:  ARNOLDO OSULLIVAN    Comments:  Dtr Minerva sets up meds in a med box with alarm reminder. Warfarin is set up in the AM,  it can be difficult to do same day dose adjustments. CALL DAUGHTER MINERVA WITH ALL DOSING/ASSESMENT [phone], patient is having confusion      Anticoagulation Care Providers     Provider Role Specialty Phone number    Adela Morgan MD Responsible Internal Medicine [phone]

## 2020-11-12 ENCOUNTER — MEDICAL CORRESPONDENCE (OUTPATIENT)
Dept: HEALTH INFORMATION MANAGEMENT | Facility: CLINIC | Age: 85
End: 2020-11-12

## 2020-11-16 ENCOUNTER — MEDICAL CORRESPONDENCE (OUTPATIENT)
Dept: HEALTH INFORMATION MANAGEMENT | Facility: CLINIC | Age: 85
End: 2020-11-16

## 2020-11-23 ENCOUNTER — MEDICAL CORRESPONDENCE (OUTPATIENT)
Dept: HEALTH INFORMATION MANAGEMENT | Facility: CLINIC | Age: 85
End: 2020-11-23

## 2020-12-02 ENCOUNTER — MYC MEDICAL ADVICE (OUTPATIENT)
Dept: INTERNAL MEDICINE | Facility: CLINIC | Age: 85
End: 2020-12-02

## 2020-12-02 DIAGNOSIS — I48.0 PAROXYSMAL ATRIAL FIBRILLATION (H): ICD-10-CM

## 2020-12-02 DIAGNOSIS — Z79.01 LONG TERM CURRENT USE OF ANTICOAGULANT THERAPY: ICD-10-CM

## 2020-12-03 NOTE — TELEPHONE ENCOUNTER
ANTICOAGULATION  MANAGEMENT    Shannan Magdaleno is being discharged from the [facility] ([facility]).    Reason for discharge: warfarin therapy completed, Patient is on hospice and discontinued warfarin    Anticoagulation episode resolved    If patient needs warfarin management in the future, please send a new referral     Liset Min RN       Protopic Counseling: Patient may experience a mild burning sensation during topical application. Protopic is not approved in children less than 2 years of age. There have been case reports of hematologic and skin malignancies in patients using topical calcineurin inhibitors although causality is questionable.

## 2021-01-01 DIAGNOSIS — I10 HYPERTENSION GOAL BP (BLOOD PRESSURE) < 140/90: ICD-10-CM

## 2021-01-01 DIAGNOSIS — I25.10 CORONARY ARTERY DISEASE INVOLVING NATIVE CORONARY ARTERY OF NATIVE HEART WITHOUT ANGINA PECTORIS: ICD-10-CM

## 2021-01-01 DIAGNOSIS — I48.0 PAROXYSMAL ATRIAL FIBRILLATION (H): ICD-10-CM

## 2021-01-01 RX ORDER — METOPROLOL TARTRATE 50 MG
TABLET ORAL
Qty: 180 TABLET | Refills: 0 | Status: SHIPPED | OUTPATIENT
Start: 2021-01-01 | End: 2022-01-01

## 2021-01-31 DIAGNOSIS — I48.0 PAROXYSMAL ATRIAL FIBRILLATION (H): ICD-10-CM

## 2021-02-03 RX ORDER — WARFARIN SODIUM 5 MG/1
TABLET ORAL
Qty: 59 TABLET | Refills: 1 | OUTPATIENT
Start: 2021-02-03

## 2021-04-02 ENCOUNTER — IMMUNIZATION (OUTPATIENT)
Dept: NURSING | Facility: CLINIC | Age: 86
End: 2021-04-02
Payer: COMMERCIAL

## 2021-04-02 PROCEDURE — 0001A PR COVID VAC PFIZER DIL RECON 30 MCG/0.3 ML IM: CPT

## 2021-04-02 PROCEDURE — 91300 PR COVID VAC PFIZER DIL RECON 30 MCG/0.3 ML IM: CPT

## 2021-04-23 ENCOUNTER — IMMUNIZATION (OUTPATIENT)
Dept: NURSING | Facility: CLINIC | Age: 86
End: 2021-04-23
Attending: FAMILY MEDICINE
Payer: COMMERCIAL

## 2021-04-23 PROCEDURE — 91300 PR COVID VAC PFIZER DIL RECON 30 MCG/0.3 ML IM: CPT

## 2021-04-23 PROCEDURE — 0002A PR COVID VAC PFIZER DIL RECON 30 MCG/0.3 ML IM: CPT

## 2021-07-01 ENCOUNTER — MYC MEDICAL ADVICE (OUTPATIENT)
Dept: INTERNAL MEDICINE | Facility: CLINIC | Age: 86
End: 2021-07-01

## 2021-07-01 DIAGNOSIS — I48.0 PAROXYSMAL ATRIAL FIBRILLATION (H): ICD-10-CM

## 2021-07-01 DIAGNOSIS — I25.10 CORONARY ARTERY DISEASE INVOLVING NATIVE CORONARY ARTERY OF NATIVE HEART WITHOUT ANGINA PECTORIS: Primary | ICD-10-CM

## 2021-07-01 DIAGNOSIS — I10 HYPERTENSION GOAL BP (BLOOD PRESSURE) < 140/90: ICD-10-CM

## 2021-07-02 RX ORDER — METOPROLOL TARTRATE 50 MG
50 TABLET ORAL 2 TIMES DAILY
Qty: 180 TABLET | Refills: 0 | Status: SHIPPED | OUTPATIENT
Start: 2021-07-02 | End: 2021-01-01

## 2021-08-04 ENCOUNTER — TRANSFERRED RECORDS (OUTPATIENT)
Dept: HEALTH INFORMATION MANAGEMENT | Facility: CLINIC | Age: 86
End: 2021-08-04

## 2021-09-04 NOTE — MR AVS SNAPSHOT
Shannan TERAN Rasta   6/4/2018 9:30 AM   Anticoagulation Therapy Visit    Description:  86 year old female   Provider:  [facility]   Department:  [facility]           INR as of 6/4/2018     Today's INR 3.2!      Anticoagulation Summary as of 6/4/2018     INR goal 2.0-3.0   Today's INR 3.2!   Full warfarin instructions 5 mg every day   Next INR check 7/16/2018    Indications   Paroxysmal atrial fibrillation (H) [I48.0]  Long-term (current) use of anticoagulants [Z79.01] [Z79.01]         Your next Anticoagulation Clinic appointment(s)     Jul 16, 2018  9:45 AM CDT   Anticoagulation Visit with DARA ANTI DEBRA   [facility] ([facility]    [address] [facility] [address] [phone]              Contact Numbers     [facility]  Please call [phone] to cancel and/or reschedule your appointment   Please call [phone] with any problems or questions regarding your therapy.        June 2018 Details    Sun Mon Tue Wed Thu Fri Sat          1               2                 3               4      5 mg   See details      5      5 mg         6      5 mg         7      5 mg         8      5 mg         9      5 mg           10      5 mg         11      5 mg         12      5 mg         13      5 mg         14      5 mg         15      5 mg         16      5 mg           17      5 mg         18      5 mg         19      5 mg         20      5 mg         21      5 mg         22      5 mg         23      5 mg           24      5 mg         25      5 mg         26      5 mg         27      5 mg         28      5 mg         29      5 mg         30      5 mg          Date Details   06/04 This INR check               How to take your warfarin dose     To take:  5 mg Take 1 of the 5 mg tablets.           July 2018 Details    Sun Mon Tue Wed Thu Fri Sat     1      5 mg         2      5 mg         3      5 mg         4      5 mg         5      5 mg         6      5 mg         7      5 mg  Kerrie Petersen was seen and treated in our emergency department on 9/4/2021  No band or sports for 10 days  Diagnosis:     Reyes Cerise  may return to gym class or sports on return date  She may return on this date: 09/15/2021         If you have any questions or concerns, please don't hesitate to call        Isaiah Whittaker PA-C    ______________________________           _______________          _______________  Hospital Representative                              Date                                Time           8      5 mg         9      5 mg         10      5 mg         11      5 mg         12      5 mg         13      5 mg         14      5 mg           15      5 mg         16            17               18               19               20               21                 22               23               24               25               26               27               28                 29               30               31                    Date Details   No additional details    Date of next INR:  7/16/2018         How to take your warfarin dose     To take:  5 mg Take 1 of the 5 mg tablets.

## 2021-10-24 ENCOUNTER — HEALTH MAINTENANCE LETTER (OUTPATIENT)
Age: 86
End: 2021-10-24

## 2021-12-30 NOTE — TELEPHONE ENCOUNTER
Routing refill request to provider for review/approval because:  BP  Appointment     BP Readings from Last 3 Encounters:   09/01/20 128/62   06/30/20 132/54   03/10/20 130/72              Pending Prescriptions:                       Disp   Refills    metoprolol tartrate (LOPRESSOR) 50 MG tabl*180 ta*0        Sig: TAKE 1 TABLET BY MOUTH TWICE A DAY        John Blevins RN

## 2022-01-01 ENCOUNTER — HEALTH MAINTENANCE LETTER (OUTPATIENT)
Age: 87
End: 2022-01-01

## 2022-01-01 DIAGNOSIS — I48.0 PAROXYSMAL ATRIAL FIBRILLATION (H): ICD-10-CM

## 2022-01-01 DIAGNOSIS — I10 HYPERTENSION GOAL BP (BLOOD PRESSURE) < 140/90: ICD-10-CM

## 2022-01-01 DIAGNOSIS — I25.10 CORONARY ARTERY DISEASE INVOLVING NATIVE CORONARY ARTERY OF NATIVE HEART WITHOUT ANGINA PECTORIS: ICD-10-CM

## 2022-01-01 RX ORDER — METOPROLOL TARTRATE 50 MG
TABLET ORAL
Qty: 180 TABLET | Refills: 0 | Status: SHIPPED | OUTPATIENT
Start: 2022-01-01

## 2022-01-01 NOTE — MR AVS SNAPSHOT
Shannan Colemankarl   2/17/2017 9:30 AM   Anticoagulation Therapy Visit    Description:  85 year old female   Provider:  [facility]   Department:  [facility]           INR as of 2/17/2017     Today's INR 2.8      Anticoagulation Summary as of 2/17/2017     INR goal 2.0-3.0   Today's INR 2.8   Full instructions 7.5 mg on Wed; 5 mg all other days   Next INR check 3/31/2017    Indications   Paroxysmal atrial fibrillation (H) [I48.0]  Long-term (current) use of anticoagulants [Z79.01] [Z79.01]         Your next Anticoagulation Clinic appointment(s)     Mar 31, 2017  9:30 AM CDT   Anticoagulation Visit with DARA ANTI COAG   [facility] ([facility]    [address] [place] [address] [phone]              Contact Numbers     [facility]  Please call [phone] to cancel and/or reschedule your appointment   Please call [phone] with any problems or questions regarding your therapy.        February 2017 Details    Sun Mon Tue Wed u Fri Sat        1               2               3               4                 5               6               7               8               9               10               11                 12               13               14               15               16               17      5 mg   See details      18      5 mg           19      5 mg         20      5 mg         21      5 mg         22      7.5 mg         23      5 mg         24      5 mg         25      5 mg           26      5 mg         27      5 mg         28      5 mg              Date Details   02/17 This INR check               How to take your warfarin dose     To take:  5 mg Take 1 of the 5 mg tablets.    To take:  7.5 mg Take 1.5 of the 5 mg tablets.           March 2017 Details    Sun Mon Tue Wed u Fri Sat        1      7.5 mg         2      5 mg         3      5 mg         4      5 mg           5      5 mg         6      5 mg         7      5 mg         8       7.5 mg         9      5 mg         10      5 mg         11      5 mg           12      5 mg         13      5 mg         14      5 mg         15      7.5 mg         16      5 mg         17      5 mg         18      5 mg           19      5 mg         20      5 mg         21      5 mg         22      7.5 mg         23      5 mg         24      5 mg         25      5 mg           26      5 mg         27      5 mg         28      5 mg         29      7.5 mg         30      5 mg         31              Date Details   No additional details    Date of next INR:  3/31/2017         How to take your warfarin dose     To take:  5 mg Take 1 of the 5 mg tablets.    To take:  7.5 mg Take 1.5 of the 5 mg tablets.            Statement Selected

## 2022-03-28 NOTE — TELEPHONE ENCOUNTER
"Routing refill request to provider for review/approval because:  Mame given x1 12/29/22 and patient did not follow up, please advise.     Requested Prescriptions   Pending Prescriptions Disp Refills    metoprolol tartrate (LOPRESSOR) 50 MG tablet [Pharmacy Med Name: METOPROLOL TARTRATE 50 MG TAB] 180 tablet 0     Sig: TAKE 1 TABLET BY MOUTH TWICE A DAY        Beta-Blockers Protocol Failed - 3/28/2022  4:14 PM        Failed - Blood pressure under 140/90 in past 12 months       BP Readings from Last 3 Encounters:   09/01/20 128/62   06/30/20 132/54   03/10/20 130/72                 Failed - Recent (12 mo) or future (30 days) visit within the authorizing provider's specialty     Patient has had an office visit with the authorizing provider or a provider within the authorizing providers department within the previous 12 mos or has a future within next 30 days. See \"Patient Info\" tab in inbasket, or \"Choose Columns\" in Meds & Orders section of the refill encounter.              Passed - Patient is age 6 or older        Passed - Medication is active on med list              Chantal Berg RN   [facility]         "

## 2022-09-07 NOTE — TELEPHONE ENCOUNTER
POSTOPERATIVE HOME INSTRUCTION FORM: Injection    Procedure:  SI Joint Injection       Activity:   DO NOT work, drive a car, and operate machinery or electrical equipment (including kitchen appliances) today. Activity as tolerated.    Other:  Dressing/Incision Site:   Keep injection site clean and dry.   You may shower/bathe tomorrow.   You may use an ice pack on and off over the injection site for the next 24 hours while awake.    Special Instructions:  DO NOT DRINK ALCOHOL TODAY due to the medication given during the procedure.  Call [phone] if you develop any of the following:   Drainage, increase in redness, and/or swelling from the injection site.   Temperature above 101 degrees.   Numbness or weakness of your legs different than before the injection.   Severe headache.    Follow Up:   For patients who had Epidural Steroid Injections: If within a week of the injection you notice significant relief, but not complete relief of pain, a second injection should be scheduled. The second injection should be 2 weeks after the first injection. Some injections may take up to 2 weeks to become fully effective.    Reason for call:  Patient reporting a symptom    Symptom or request:  Patient saw Dr HENDERSON on Tuesday for coughing and wheezing.  Dr HENDERSON had mentioned prescribing a steroid but patient declined at that time.  Patient feels like she is not getting better and she would like to get that today ASAP.  Please call patient to discuss.    Duration (how long have symptoms been present):  A couple of weeks    Have you been treated for this before? No    Additional comments:     Phone Number patient can be reached at:  Home number on file [phone] (home)    Best Time:  any    Can we leave a detailed message on this number:  YES    Call taken on 12/14/2018 at 9:57 AM by Clau Rodriguez

## 2022-11-27 ENCOUNTER — HEALTH MAINTENANCE LETTER (OUTPATIENT)
Age: 87
End: 2022-11-27

## 2023-06-09 NOTE — MR AVS SNAPSHOT
After Visit Summary   3/3/2018    Shannan Magdaleno    MRN: [id number]           Patient Information     Date Of Birth          12/2/1931        Visit Information        Provider Department      3/3/2018 1:25 PM Zoila Houser APRN [facility]        Care Instructions      Influenza (Adult)    Influenza is also called the flu. It is a viral illness that affects the air passages of your lungs. It is different from the common cold. The flu can easily be passed from one to person to another. It may be spread through the air by coughing and sneezing. Or it can be spread by touching the sick person and then touching your own eyes, nose, or mouth.  The flu starts 1 to 3 days after you are exposed to the flu virus. It may last for 1 to 2 weeks but many people feel tired or fatigued for many weeks afterward. You usually don t need to take antibiotics unless you have a complication. This might be an ear or sinus infection or pneumonia.  Symptoms of the flu may be mild or severe. They can include extreme tiredness (wanting to stay in bed all day), chills, fevers, muscle aches, soreness with eye movement, headache, and a dry, hacking cough.  Home care  Follow these guidelines when caring for yourself at home:    Avoid being around cigarette smoke, whether yours or other people s.    Acetaminophen or ibuprofen will help ease your fever, muscle aches, and headache. Don t give aspirin to anyone younger than 18 who has the flu. Aspirin can harm the liver.    Nausea and loss of appetite are common with the flu. Eat light meals. Drink 6 to 8 glasses of liquids every day. Good choices are water, sport drinks, soft drinks without caffeine, juices, tea, and soup. Extra fluids will also help loosen secretions in your nose and lungs.    Over-the-counter cold medicines will not make the flu go away faster. But the medicines may help with coughing, sore throat, and congestion in your nose and  1 SST, 1 LAV obtained from [facility] using BF needle, Pt handled well sinuses. Don t use a decongestant if you have high blood pressure.    Stay home until your fever has been gone for at least 24 hours without using medicine to reduce fever.  Follow-up care  Follow up with your healthcare provider, or as advised, if you are not getting better over the next week.  If you are age 65 or older, talk with your provider about getting a pneumococcal vaccine every 5 years. You should also get this vaccine if you have chronic asthma or COPD. All adults should get a flu vaccine every fall. Ask your provider about this.  When to seek medical advice  Call your healthcare provider right away if any of these occur:    Cough with lots of colored mucus (sputum) or blood in your mucus    Chest pain, shortness of breath, wheezing, or trouble breathing    Severe headache, or face, neck, or ear pain    New rash with fever    Fever of 100.4 F (38 C) or higher, or as directed by your healthcare provider    Confusion, behavior change, or seizure    Severe weakness or dizziness    You get a new fever or cough after getting better for a few days  Date Last Reviewed: 1/1/2017 2000-2017 [employer]. [address] [place] [address]. All rights reserved. This information is not intended as a substitute for professional medical care. Always follow your healthcare professional's instructions.                Follow-ups after your visit        Follow-up notes from your care team     Return in about 3 days (around 3/6/2018) for Recheck with Dr Morgan.      Your next 10 appointments already scheduled     Mar 28, 2018  9:45 AM CDT   Anticoagulation Visit with ABIGAIL ANTI COAG   [facility] [place] [facility] [place]    [address]  [place] [address] [phone]              Who to contact     If you have questions or need follow up information about today's clinic visit or your schedule please contact [facility] JAE SANON directly at [phone].  Normal or  non-critical lab and imaging results will be communicated to you by MyChart, letter or phone within 4 business days after the clinic has received the results. If you do not hear from us within 7 days, please contact the clinic through [employer] or phone. If you have a critical or abnormal lab result, we will notify you by phone as soon as possible.  Submit refill requests through [employer] or call your pharmacy and they will forward the refill request to us. Please allow 3 business days for your refill to be completed.          Additional Information About Your Visit        Lumiary Information     [employer] gives you secure access to your electronic health record. If you see a primary care provider, you can also send messages to your care team and make appointments. If you have questions, please call your primary care clinic.  If you do not have a primary care provider, please call [phone] and they will assist you.        Care EveryWhere ID     This is your Care EveryWhere ID. This could be used by other organizations to access your [place] medical records  [id number]        Your Vitals Were     Pulse Temperature Respirations Pulse Oximetry BMI (Body Mass Index)       76 98.6  F (37  C) 15 96% 27.07 kg/m2        Blood Pressure from Last 3 Encounters:   03/03/18 122/60   03/01/18 134/76   12/23/17 143/78    Weight from Last 3 Encounters:   03/03/18 148 lb (67.1 kg)   03/01/18 149 lb 4.8 oz (67.7 kg)   12/23/17 146 lb 6.4 oz (66.4 kg)              Today, you had the following     No orders found for display       Primary Care Provider Office Phone # Fax #    Adela Morgan [phone] [address] [place] [address]        Equal Access to Services     [facility]: Hadii taz Silva, walucilleda luqadaha, qaybta kaalmada carlton, claudia valadez. So [facility] [phone].    ATENCIÓN: Si habla español, tiene a linn disposición servicios gratuitos de  asistencia lingüística. Kamla al [phone].    We comply with applicable federal civil rights laws and [place] laws. We do not discriminate on the basis of race, color, national origin, age, disability, sex, sexual orientation, or gender identity.            Thank you!     Thank you for choosing [facility]  for your care. Our goal is always to provide you with excellent care. Hearing back from our patients is one way we can continue to improve our services. Please take a few minutes to complete the written survey that you may receive in the mail after your visit with us. Thank you!             Your Updated Medication List - Protect others around you: Learn how to safely use, store and throw away your medicines at www.disposemymeds.org.          This list is accurate as of 3/3/18  3:28 PM.  Always use your most recent med list.                   Brand Name Dispense Instructions for use Diagnosis    ASPIRIN NOT PRESCRIBED    INTENTIONAL    0 each    Please choose reason not prescribed, below    Paroxysmal atrial fibrillation (H)       calcium-vitamin D 600-400 MG-UNIT per tablet    CALTRATE     Take 1 tablet by mouth daily        CENTRUM SILVER per tablet     30 tablet    Take 1 tablet by mouth daily Has 2000 IU of Vitamin D in it.    Osteopenia       diazepam 2 MG tablet    VALIUM    60 tablet    TAKE 1/2-1 TABLET BY MOUTH AT BEDTIME FOR ANXIETY    Anxiety       diclofenac 1 % Gel topical gel    VOLTAREN    100 g    Apply 4 grams to knees or 2 grams to hands four times daily as needed using enclosed dosing card.    Left foot pain, Osteoarthritis, unspecified osteoarthritis type, unspecified site       escitalopram 10 MG tablet    LEXAPRO    135 tablet    TAKE 1&1/2 TABLETS (15 MG) BY MOUTH DAILY    Anxiety       metoprolol tartrate 25 MG tablet    LOPRESSOR    270 tablet    Take 1.5 tablets (37.5 mg) by mouth 2 times daily    Paroxysmal atrial fibrillation (H)       mometasone 0.1 % cream     ELOCON    15 g    Apply sparingly to affected area twice daily for 1 week.    Skin lesion of left leg       mupirocin 2 % ointment    BACTROBAN    30 g    Apply to infected wound three times a day    Local infection of wound       nitroGLYcerin 0.4 MG sublingual tablet    NITROSTAT    25 tablet    Place 1 tablet (0.4 mg) under the tongue every 5 minutes as needed for chest pain    Coronary artery disease involving native coronary artery of native heart without angina pectoris       oseltamivir 75 MG capsule    TAMIFLU    10 capsule    Take 1 capsule (75 mg) by mouth 2 times daily    Influenza A       simvastatin 40 MG tablet    ZOCOR    90 tablet    TAKE 1 TABLET (40 MG) BY MOUTH AT BEDTIME    Hyperlipidemia LDL goal <70       TYLENOL 500 MG tablet   Generic drug:  acetaminophen      Take 1-2 tablets by mouth every 6 hours as needed.        warfarin 5 MG tablet    COUMADIN    90 tablet    TAKE 1 & 1/2 TABLETS ON WED, AND 1 TAB REST OF WEEK    Paroxysmal atrial fibrillation (H)

## 2023-08-31 NOTE — NURSING NOTE
"Chief Complaint   Patient presents with     Infection     possible in Left leg at the biopsy site x       Initial /64  Pulse 64  Temp 98.6  F (37  C) (Oral)  Wt 149 lb 12.8 oz (67.9 kg)  SpO2 98%  BMI 27.4 kg/m2 Estimated body mass index is 27.4 kg/(m^2) as calculated from the following:    Height as of 10/19/17: 5' 2\" (1.575 m).    Weight as of this encounter: 149 lb 12.8 oz (67.9 kg).  Medication Reconciliation: complete    " Epidermal Autograft Text: The defect edges were debeveled with a #15 scalpel blade.  Given the location of the defect, shape of the defect and the proximity to free margins an epidermal autograft was deemed most appropriate.  Using a sterile surgical marker, the primary defect shape was transferred to the donor site. The epidermal graft was then harvested.  The skin graft was then placed in the primary defect and oriented appropriately.

## 2025-03-19 NOTE — PROGRESS NOTES
Dear Shannan,    Your recent test results are attached.      Your ultrasound was normal.  I did discuss your symptoms with Adela Morgan MD.  At this time, given normal urine and imaging, I would recommend that you continue to monitor your symptoms.  I do not feel that seeing Urology would be helpful.  Please let us know if they worsen or change.    If you have any questions please feel free to contact [phone] or myself via Not[employer].    Sincerely,  Dorothy Yin, CNP   98

## 2025-05-27 NOTE — TELEPHONE ENCOUNTER
Have you been to the ER, urgent care clinic since your last visit?  Hospitalized since your last visit?   NO    Have you seen or consulted any other health care providers outside our system since your last visit?   NO      “Have you had a diabetic eye exam?”    NO     No diabetic eye exam on file           
Patient needs seen and is requesting appointment  Please schedule with Dr. Morgan or another provider    Tania Hall RN    
Spoke to patient's daughter, Minerva ([phone]).  Appointment scheduled with Dorothy Yin CNP for tomorrow, Tuesday, November 6th at 11:40 a.m. Marifer Nieves,     
Usha STOKES MD   hydrOXYzine HCl (ATARAX) 25 MG tablet Take 1 tablet by mouth nightly Yes Usha Zepeda MD   metFORMIN (GLUCOPHAGE-XR) 500 MG extended release tablet Take 1 tablet by mouth daily Yes Usha Zepeda MD   metoprolol (LOPRESSOR) 100 MG tablet Take 1 tablet by mouth daily Yes Usha Zepeda MD   montelukast (SINGULAIR) 10 MG tablet Take 1 tablet by mouth daily Yes Usha Zepeda MD   omeprazole (PRILOSEC) 20 MG delayed release capsule Take 1 capsule by mouth daily Yes Usha Zepeda MD   pregabalin (LYRICA) 75 MG capsule Take 1 capsule by mouth daily for 180 days. Max Daily Amount: 75 mg Yes Usha Zepeda MD   rosuvastatin (CRESTOR) 10 MG tablet Take 1 tablet by mouth daily Yes Usha Zepeda MD   triamterene-hydroCHLOROthiazide (DYAZIDE) 37.5-25 MG per capsule Take 1 capsule by mouth daily Yes Usha Zepeda MD   levocetirizine (XYZAL) 5 MG tablet Take 1 tablet by mouth nightly Yes Usha Zepeda MD   ciclopirox (PENLAC) 8 % solution APPLY TO NAIL DAILY FOR 6 DAYS THEN REMOVE ON THE 7TH DAY REPEAT WEEKLY Yes Jose Francisco Akers MD   Continuous Blood Gluc  (FREESTYLE GARFIELD 2 READER) SPENCER  Yes Jose Francisco Akers MD   Continuous Blood Gluc Sensor (FREESTYLE GARFIELD 2 SENSOR) MISC  Yes ProviderJose Francisco MD   OZEMPIC, 1 MG/DOSE, 4 MG/3ML SOPN  Yes ProviderJose Francisco MD   acetaminophen (TYLENOL) 500 MG tablet Take 2 tablets by mouth every 12 hours as needed Yes ProviderJose Francisco MD   colchicine (MITIGARE) 0.6 MG capsule take 2 tablets at onset of gout and may take 1 tablet after an hour if needed; may repeat daily as necessary Yes Provider, Historical, MD       CareTeam (Including outside providers/suppliers regularly involved in providing care):   Patient Care Team:  Usha Zepeda MD as PCP - General (Family Medicine)  Usha Zepeda MD as PCP - Empaneled Provider     Recommendations for Preventive Services Due: see orders and